# Patient Record
Sex: FEMALE | Race: WHITE | NOT HISPANIC OR LATINO | Employment: UNEMPLOYED | URBAN - METROPOLITAN AREA
[De-identification: names, ages, dates, MRNs, and addresses within clinical notes are randomized per-mention and may not be internally consistent; named-entity substitution may affect disease eponyms.]

---

## 2017-01-13 ENCOUNTER — PATIENT MESSAGE (OUTPATIENT)
Dept: INTERNAL MEDICINE | Facility: CLINIC | Age: 42
End: 2017-01-13

## 2017-01-13 DIAGNOSIS — Z20.828 EXPOSURE TO INFLUENZA: Primary | ICD-10-CM

## 2017-01-13 RX ORDER — OSELTAMIVIR PHOSPHATE 75 MG/1
75 CAPSULE ORAL DAILY
Qty: 7 CAPSULE | Refills: 0 | Status: SHIPPED | OUTPATIENT
Start: 2017-01-13 | End: 2017-01-20

## 2017-05-15 ENCOUNTER — TELEPHONE (OUTPATIENT)
Dept: INTERNAL MEDICINE | Facility: CLINIC | Age: 42
End: 2017-05-15

## 2017-05-15 ENCOUNTER — OFFICE VISIT (OUTPATIENT)
Dept: INTERNAL MEDICINE | Facility: CLINIC | Age: 42
End: 2017-05-15
Payer: COMMERCIAL

## 2017-05-15 VITALS
HEIGHT: 69 IN | OXYGEN SATURATION: 98 % | DIASTOLIC BLOOD PRESSURE: 70 MMHG | TEMPERATURE: 98 F | HEART RATE: 70 BPM | SYSTOLIC BLOOD PRESSURE: 110 MMHG | BODY MASS INDEX: 27.3 KG/M2 | WEIGHT: 184.31 LBS

## 2017-05-15 DIAGNOSIS — G89.29 CHRONIC NECK AND BACK PAIN: ICD-10-CM

## 2017-05-15 DIAGNOSIS — M54.50 CHRONIC BILATERAL LOW BACK PAIN WITHOUT SCIATICA: Primary | ICD-10-CM

## 2017-05-15 DIAGNOSIS — G89.29 CHRONIC BILATERAL LOW BACK PAIN WITHOUT SCIATICA: Primary | ICD-10-CM

## 2017-05-15 DIAGNOSIS — M54.2 CHRONIC NECK AND BACK PAIN: ICD-10-CM

## 2017-05-15 DIAGNOSIS — G89.29 CHRONIC PAIN OF LEFT KNEE: ICD-10-CM

## 2017-05-15 DIAGNOSIS — M25.562 CHRONIC PAIN OF LEFT KNEE: ICD-10-CM

## 2017-05-15 DIAGNOSIS — M25.561 ACUTE PAIN OF RIGHT KNEE: Primary | ICD-10-CM

## 2017-05-15 DIAGNOSIS — Z12.39 BREAST CANCER SCREENING: ICD-10-CM

## 2017-05-15 DIAGNOSIS — M54.9 CHRONIC NECK AND BACK PAIN: ICD-10-CM

## 2017-05-15 DIAGNOSIS — R41.840 DIFFICULTY CONCENTRATING: ICD-10-CM

## 2017-05-15 PROCEDURE — 1160F RVW MEDS BY RX/DR IN RCRD: CPT | Mod: S$GLB,,, | Performed by: FAMILY MEDICINE

## 2017-05-15 PROCEDURE — 99214 OFFICE O/P EST MOD 30 MIN: CPT | Mod: S$GLB,,, | Performed by: FAMILY MEDICINE

## 2017-05-15 PROCEDURE — 99999 PR PBB SHADOW E&M-EST. PATIENT-LVL III: CPT | Mod: PBBFAC,,, | Performed by: FAMILY MEDICINE

## 2017-05-15 NOTE — PROGRESS NOTES
Chief Complaint:    Chief Complaint   Patient presents with    Back Pain    Neck Pain       History of Present Illness:    She presents with several complaints,  She's had some low back pain and neck pain for years except that she feels like it goes towards the left thigh denies any tingling numbness and weakness.  She has been going to chiropractor without much help.  Denies any bladder or bowel dysfunction she had some x-rays done of the back shows slight narrowing of the disc space.    She has some pain in the back of the knee has been going on for years she seemed not was told that she had arthritis.  She was offered steroid injection which she refused.    She also has trouble concentrating finds that she starts many projects and not finish trouble remembering sometimes she thinks she may have ADD.    ROS:  Review of Systems   Constitutional: Negative for activity change, chills, fatigue, fever and unexpected weight change.   HENT: Negative for congestion, ear discharge, ear pain, hearing loss, postnasal drip and rhinorrhea.    Eyes: Negative for pain and visual disturbance.   Respiratory: Negative for cough, chest tightness and shortness of breath.    Cardiovascular: Negative for chest pain and palpitations.   Gastrointestinal: Negative for abdominal pain, diarrhea and vomiting.   Endocrine: Negative for heat intolerance.   Genitourinary: Negative for dysuria, flank pain, frequency and hematuria.   Musculoskeletal: Positive for back pain and neck pain. Negative for gait problem.   Skin: Negative for color change and rash.   Neurological: Negative for dizziness, tremors, seizures, numbness and headaches.   Psychiatric/Behavioral: Positive for decreased concentration. Negative for agitation, hallucinations, self-injury, sleep disturbance and suicidal ideas. The patient is not nervous/anxious.        Past Medical History:   Diagnosis Date    DDD (degenerative disc disease), lumbar     Fatty liver      "Nicotine abuse        Social History:  Social History     Social History    Marital status:      Spouse name: N/A    Number of children: N/A    Years of education: N/A     Occupational History     Homemaker     Social History Main Topics    Smoking status: Former Smoker     Types: Cigarettes     Quit date: 10/7/2015    Smokeless tobacco: Not on file    Alcohol use 0.0 oz/week     0 Standard drinks or equivalent per week      Comment: occasionally     Drug use: No    Sexual activity: Yes     Partners: Female     Other Topics Concern    Not on file     Social History Narrative    No narrative on file       Family History:   family history includes Cancer in her maternal grandmother; Diabetes in her cousin; Heart attack in her maternal grandmother; Hypertension in her maternal grandmother.    Health Maintenance   Topic Date Due    Influenza Vaccine  08/01/2017    Mammogram  11/10/2017    Pap Smear  11/11/2018    TETANUS VACCINE  03/15/2023    Lipid Panel  Completed       Physical Exam:    Vital Signs  Temp: 98.2 °F (36.8 °C)  Temp src: Tympanic  Pulse: 70  SpO2: 98 %  BP: 110/70  BP Location: Left arm  BP Method: Manual  Patient Position: Sitting  Pain Score:   6  Pain Loc: Back  Height and Weight  Height: 5' 9" (175.3 cm)  Weight: 83.6 kg (184 lb 4.9 oz)  BSA (Calculated - sq m): 2.02 sq meters  BMI (Calculated): 27.3  Weight in (lb) to have BMI = 25: 168.9]    Body mass index is 27.22 kg/(m^2).    Physical Exam   Constitutional: She is oriented to person, place, and time. She appears well-developed.   HENT:   Mouth/Throat: Oropharynx is clear and moist.   Eyes: Conjunctivae are normal. Pupils are equal, round, and reactive to light.   Neck: Normal range of motion. Neck supple.       Cardiovascular: Normal rate, regular rhythm and normal heart sounds.    No murmur heard.  Pulmonary/Chest: Effort normal and breath sounds normal. No respiratory distress. She has no wheezes. She has no rales. She " exhibits no tenderness.   Abdominal: Soft. She exhibits no distension and no mass. There is no tenderness. There is no guarding.   Musculoskeletal: She exhibits no edema.        Left knee: She exhibits normal patellar mobility, no bony tenderness, normal meniscus and no MCL laxity. Tenderness found. MCL tenderness noted. No medial joint line, no lateral joint line and no LCL tenderness noted.        Back:    Bilateral straight leg raising test is negative, bilateral knee jerk and conjunctivae intact, strength is 5 over 5 bilateral lower extremity there is no sensory loss.        Tenderness lateral hip on the left hip has full range of motion.      Tenderness posterior knee   Lymphadenopathy:     She has no cervical adenopathy.   Neurological: She is alert and oriented to person, place, and time. She has normal reflexes.   Skin: Skin is warm and dry.   Psychiatric: She has a normal mood and affect. Her behavior is normal. Judgment and thought content normal.       Lab Results   Component Value Date    CHOL 190 12/15/2016    CHOL 185 03/25/2013    CHOL 181 08/24/2010    TRIG 80 12/15/2016    TRIG 62 03/25/2013    TRIG 80 08/24/2010    HDL 60 12/15/2016    HDL 55 03/25/2013    HDL 44 08/24/2010    TOTALCHOLEST 3.2 12/15/2016    TOTALCHOLEST 3.4 03/25/2013    TOTALCHOLEST 4.1 08/24/2010    NONHDLCHOL 130 12/15/2016       Lab Results   Component Value Date    HGBA1C 5.1 12/15/2016       Assessment:      ICD-10-CM ICD-9-CM   1. Chronic bilateral low back pain without sciatica M54.5 724.2    G89.29 338.29   2. Difficulty concentrating R41.840 799.51   3. Breast cancer screening Z12.39 V76.10   4. Chronic neck and back pain M54.2 723.1    M54.9 724.5   5. Chronic pain of left knee M25.562 719.46    G89.29 338.29         Plan:  1.  Chronic low back pain he could benefit from physical therapy do not think she has any case of radiculopathy or disc herniation.  She does have some SI joint inflammation on the left she could  benefit from a steroid injection but she diffuse.  2.  Left hip bursitis offered steroid injection but she refused  3.  Chronic neck pain musculoskeletal in nature again physical therapy will help refer to [facility]  4.  Chronic knee pain posteriorly will need MRI of the knee she wants to do an open MRI because she is claustrophobic.  5.  Trouble concentrating will recommend psychological evaluation before ADD meds could be prescribed.  Orders Placed This Encounter   Procedures    Mammo Digital Screening Bilat with CAD    Ambulatory consult to Physical Therapy       Current Outpatient Prescriptions   Medication Sig Dispense Refill    cetirizine (ZYRTEC) 10 MG tablet Take 1 tablet (10 mg total) by mouth every evening. 30 tablet 1    fluticasone (FLONASE) 50 mcg/actuation nasal spray 2 sprays by Each Nare route once daily. 16 g 11     No current facility-administered medications for this visit.        Medications Discontinued During This Encounter   Medication Reason    levocetirizine (XYZAL) 5 MG tablet Patient no longer taking       No Follow-up on file.      Dr Jennifer Summers MD    Disclaimer: This note is prepared using voice recognition system and as such is likely to have errors and is not proof read.

## 2017-05-15 NOTE — MR AVS SNAPSHOT
[facility]  [address] [place] [id number]  Phone: [phone]                  Venice Orosco   5/15/2017 2:00 PM   Office Visit    Description:  Female : 1975   Provider:  Jennifer Summers MD   Department:  [facility]           Reason for Visit     Back Pain     Neck Pain           Diagnoses this Visit        Comments    Chronic bilateral low back pain without sciatica    -  Primary     Difficulty concentrating         Breast cancer screening         Chronic neck and back pain         Chronic pain of left knee                To Do List           Goals (5 Years of Data)     None      [facility]     [facility] On [facility] Nurse Care Line -  Assistance  Unless otherwise directed by your provider, please contact [facility], our nurse care line that is available for  assistance.     Registered nurses in the [facility] On Call Center provide: appointment scheduling, clinical advisement, health education, and other advisory services.  Call: [phone] (toll free)               Medications           Message regarding Medications     Verify the changes and/or additions to your medication regime listed below are the same as discussed with your clinician today.  If any of these changes or additions are incorrect, please notify your healthcare provider.        STOP taking these medications     levocetirizine (XYZAL) 5 MG tablet Take 1 tablet (5 mg total) by mouth every evening.           Verify that the below list of medications is an accurate representation of the medications you are currently taking.  If none reported, the list may be blank. If incorrect, please contact your healthcare provider. Carry this list with you in case of emergency.           Current Medications     cetirizine (ZYRTEC) 10 MG tablet Take 1 tablet (10 mg total) by mouth every evening.    fluticasone (FLONASE) 50 mcg/actuation nasal spray 2 sprays by Each Nare route once daily.  "          Clinical Reference Information           Your Vitals Were     BP Pulse Temp Height    110/70 (BP Location: Left arm, Patient Position: Sitting, BP Method: Manual) 70 98.2 °F (36.8 °C) (Tympanic) 5' 9" (1.753 m)    Weight SpO2 BMI    83.6 kg (184 lb 4.9 oz) 98% 27.22 kg/m2      Blood Pressure          Most Recent Value    BP  110/70      Allergies as of 5/15/2017     Penicillins      Immunizations Administered on Date of Encounter - 5/15/2017     None      Orders Placed During Today's Visit      Normal Orders This Visit    Ambulatory consult to Physical Therapy     Future Labs/Procedures Expected by Expires    Mammo Digital Screening Bilat with CAD  5/15/2017 (Approximate) 7/15/2018      Language Assistance Services     ATTENTION: Language assistance services are available, free of charge. Please call [phone].      ATENCIÓN: Si christinala lisa, tiene a morris disposición servicios gratuitos de asistencia lingüística. Llame al [phone].     CHÚ Ý: N?u b?n nói Ti?ng Vi?t, có các d?ch v? h? tr? ngôn ng? mi?n phí dành cho b?n. G?i s? [phone].         [facility] complies with applicable Federal civil rights laws and does not discriminate on the basis of race, color, national origin, age, disability, or sex.        "

## 2017-06-13 ENCOUNTER — PATIENT MESSAGE (OUTPATIENT)
Dept: FAMILY MEDICINE | Facility: CLINIC | Age: 42
End: 2017-06-13

## 2017-06-13 DIAGNOSIS — G89.29 CHRONIC PAIN OF LEFT KNEE: ICD-10-CM

## 2017-06-13 DIAGNOSIS — M25.562 CHRONIC PAIN OF LEFT KNEE: ICD-10-CM

## 2017-06-13 DIAGNOSIS — G89.29 CHRONIC BILATERAL LOW BACK PAIN WITHOUT SCIATICA: Primary | ICD-10-CM

## 2017-06-13 DIAGNOSIS — M54.50 CHRONIC BILATERAL LOW BACK PAIN WITHOUT SCIATICA: Primary | ICD-10-CM

## 2017-06-14 ENCOUNTER — TELEPHONE (OUTPATIENT)
Dept: FAMILY MEDICINE | Facility: CLINIC | Age: 42
End: 2017-06-14

## 2017-06-14 DIAGNOSIS — M54.5 CHRONIC LOW BACK PAIN, UNSPECIFIED BACK PAIN LATERALITY, WITH SCIATICA PRESENCE UNSPECIFIED: Primary | ICD-10-CM

## 2017-06-14 DIAGNOSIS — G89.29 CHRONIC LOW BACK PAIN, UNSPECIFIED BACK PAIN LATERALITY, WITH SCIATICA PRESENCE UNSPECIFIED: Primary | ICD-10-CM

## 2017-06-14 DIAGNOSIS — G89.29 CHRONIC PAIN OF LEFT KNEE: ICD-10-CM

## 2017-06-14 DIAGNOSIS — M25.562 CHRONIC PAIN OF LEFT KNEE: ICD-10-CM

## 2017-06-15 ENCOUNTER — PATIENT MESSAGE (OUTPATIENT)
Dept: FAMILY MEDICINE | Facility: CLINIC | Age: 42
End: 2017-06-15

## 2017-06-29 ENCOUNTER — PATIENT MESSAGE (OUTPATIENT)
Dept: FAMILY MEDICINE | Facility: CLINIC | Age: 42
End: 2017-06-29

## 2017-06-30 ENCOUNTER — PATIENT MESSAGE (OUTPATIENT)
Dept: FAMILY MEDICINE | Facility: CLINIC | Age: 42
End: 2017-06-30

## 2017-08-09 ENCOUNTER — TELEPHONE (OUTPATIENT)
Dept: FAMILY MEDICINE | Facility: CLINIC | Age: 42
End: 2017-08-09

## 2017-08-15 ENCOUNTER — PATIENT MESSAGE (OUTPATIENT)
Dept: FAMILY MEDICINE | Facility: CLINIC | Age: 42
End: 2017-08-15

## 2017-08-30 ENCOUNTER — HOSPITAL ENCOUNTER (OUTPATIENT)
Dept: RADIOLOGY | Facility: HOSPITAL | Age: 42
Discharge: HOME | End: 2017-08-30
Attending: FAMILY MEDICINE
Payer: COMMERCIAL

## 2017-08-30 DIAGNOSIS — Z12.39 BREAST CANCER SCREENING: ICD-10-CM

## 2017-08-30 PROCEDURE — 77067 SCR MAMMO BI INCL CAD: CPT | Mod: 26,,, | Performed by: RADIOLOGY

## 2017-08-30 PROCEDURE — 77067 SCR MAMMO BI INCL CAD: CPT | Mod: TC

## 2017-08-30 PROCEDURE — 77063 BREAST TOMOSYNTHESIS BI: CPT | Mod: 26,,, | Performed by: RADIOLOGY

## 2017-08-31 NOTE — PROGRESS NOTES
Your mammogram results are back.  We did not see any radiographic abnormality and a repeat mammogram in one year is recommended, unless you detect any changes.  Please understand that a normal mammogram does not exclude the possibility of a missed breast   cancer.  If you notice any changes in the breast please notify us immediately.  We do recommend monthly breast self-examination.  Recommended yearly mammogram unless otherwise contraindicated.

## 2017-09-26 ENCOUNTER — PATIENT MESSAGE (OUTPATIENT)
Dept: FAMILY MEDICINE | Facility: CLINIC | Age: 42
End: 2017-09-26

## 2017-09-26 ENCOUNTER — OFFICE VISIT (OUTPATIENT)
Dept: FAMILY MEDICINE | Facility: CLINIC | Age: 42
End: 2017-09-26
Payer: COMMERCIAL

## 2017-09-26 VITALS
HEART RATE: 78 BPM | OXYGEN SATURATION: 98 % | SYSTOLIC BLOOD PRESSURE: 122 MMHG | DIASTOLIC BLOOD PRESSURE: 66 MMHG | WEIGHT: 181.44 LBS | BODY MASS INDEX: 26.87 KG/M2 | TEMPERATURE: 97 F | HEIGHT: 69 IN

## 2017-09-26 DIAGNOSIS — J32.9 SINUSITIS, BACTERIAL: Primary | ICD-10-CM

## 2017-09-26 DIAGNOSIS — B96.89 SINUSITIS, BACTERIAL: Primary | ICD-10-CM

## 2017-09-26 PROCEDURE — 3008F BODY MASS INDEX DOCD: CPT | Mod: S$GLB,,, | Performed by: FAMILY MEDICINE

## 2017-09-26 PROCEDURE — 99999 PR PBB SHADOW E&M-EST. PATIENT-LVL III: CPT | Mod: PBBFAC,,, | Performed by: FAMILY MEDICINE

## 2017-09-26 PROCEDURE — 96372 THER/PROPH/DIAG INJ SC/IM: CPT | Mod: S$GLB,,, | Performed by: FAMILY MEDICINE

## 2017-09-26 PROCEDURE — 99214 OFFICE O/P EST MOD 30 MIN: CPT | Mod: 25,S$GLB,, | Performed by: FAMILY MEDICINE

## 2017-09-26 RX ORDER — AZITHROMYCIN 250 MG/1
TABLET, FILM COATED ORAL
Qty: 6 TABLET | Refills: 0 | Status: SHIPPED | OUTPATIENT
Start: 2017-09-26 | End: 2017-11-06 | Stop reason: ALTCHOICE

## 2017-09-26 RX ORDER — FLUTICASONE PROPIONATE 50 MCG
2 SPRAY, SUSPENSION (ML) NASAL DAILY
Qty: 16 G | Refills: 11 | Status: SHIPPED | OUTPATIENT
Start: 2017-09-26 | End: 2017-12-20 | Stop reason: SDUPTHER

## 2017-09-26 RX ORDER — CETIRIZINE HYDROCHLORIDE 10 MG/1
10 TABLET ORAL NIGHTLY
Qty: 30 TABLET | Refills: 5 | Status: SHIPPED | OUTPATIENT
Start: 2017-09-26 | End: 2019-05-01

## 2017-09-26 RX ORDER — PREDNISONE 20 MG/1
20 TABLET ORAL DAILY
Qty: 4 TABLET | Refills: 0 | Status: SHIPPED | OUTPATIENT
Start: 2017-09-26 | End: 2017-09-30

## 2017-09-26 RX ORDER — METHYLPREDNISOLONE ACETATE 40 MG/ML
40 INJECTION, SUSPENSION INTRA-ARTICULAR; INTRALESIONAL; INTRAMUSCULAR; SOFT TISSUE ONCE
Status: COMPLETED | OUTPATIENT
Start: 2017-09-26 | End: 2017-09-26

## 2017-09-26 RX ADMIN — METHYLPREDNISOLONE ACETATE 40 MG: 40 INJECTION, SUSPENSION INTRA-ARTICULAR; INTRALESIONAL; INTRAMUSCULAR; SOFT TISSUE at 01:09

## 2017-09-26 NOTE — PATIENT INSTRUCTIONS
Sinusitis (Antibiotic Treatment)    The sinuses are air-filled spaces within the bones of the face. They connect to the inside of the nose. Sinusitis is an inflammation of the tissue lining the sinus cavity. Sinus inflammation can occur during a cold. It can also be due to allergies to pollens and other particles in the air. Sinusitis can cause symptoms of sinus congestion and fullness. A sinus infection causes fever, headache and facial pain. There is often green or yellow drainage from the nose or into the back of the throat (post-nasal drip). You have been given antibiotics to treat this condition.  Home care:  · Take the full course of antibiotics as instructed. Do not stop taking them, even if you feel better.  · Drink plenty of water, hot tea, and other liquids. This may help thin mucus. It also may promote sinus drainage.  · Heat may help soothe painful areas of the face. Use a towel soaked in hot water. Or,  the shower and direct the hot spray onto your face. Using a vaporizer along with a menthol rub at night may also help.   · An expectorant containing guaifenesin may help thin the mucus and promote drainage from the sinuses.  · Over-the-counter decongestants may be used unless a similar medicine was prescribed. Nasal sprays work the fastest. Use one that contains phenylephrine or oxymetazoline. First blow the nose gently. Then use the spray. Do not use these medicines more often than directed on the label or symptoms may get worse. You may also use tablets containing pseudoephedrine. Avoid products that combine ingredients, because side effects may be increased. Read labels. You can also ask the pharmacist for help. (NOTE: Persons with high blood pressure should not use decongestants. They can raise blood pressure.)  · Over-the-counter antihistamines may help if allergies contributed to your sinusitis.    · Do not use nasal rinses or irrigation during an acute sinus infection, unless told to by  your health care provider. Rinsing may spread the infection to other sinuses.  · Use acetaminophen or ibuprofen to control pain, unless another pain medicine was prescribed. (If you have chronic liver or kidney disease or ever had a stomach ulcer, talk with your doctor before using these medicines. Aspirin should never be used in anyone under 18 years of age who is ill with a fever. It may cause severe liver damage.)  · Don't smoke. This can worsen symptoms.  Follow-up care  Follow up with your healthcare provider or our staff if you are not improving within the next week.  When to seek medical advice  Call your healthcare provider if any of these occur:  · Facial pain or headache becoming more severe  · Stiff neck  · Unusual drowsiness or confusion  · Swelling of the forehead or eyelids  · Vision problems, including blurred or double vision  · Fever of 100.4ºF (38ºC) or higher, or as directed by your healthcare provider  · Seizure  · Breathing problems  · Symptoms not resolving within 10 days  Date Last Reviewed: 4/13/2015  © 8290-6343 [employer] [address] [place] [address], [place] [address]. All rights reserved. This information is not intended as a substitute for professional medical care. Always follow your healthcare professional's instructions.

## 2017-09-26 NOTE — PROGRESS NOTES
"Subjective:       Patient ID: Venice Orosco is a 42 y.o. female.    Chief Complaint: Sinus Problem    Sinus Problem   This is a recurrent problem. The current episode started 1 to 4 weeks ago (3 weeks). The problem has been waxing and waning since onset. There has been no fever. Associated symptoms include congestion, coughing, diaphoresis, ear pain, a hoarse voice and sinus pressure. Pertinent negatives include no chills, headaches, neck pain, shortness of breath, sneezing, sore throat or swollen glands. Past treatments include oral decongestants (tried pseudafed, zyrtec and flonase.). The treatment provided mild relief.     Review of Systems   Constitutional: Positive for diaphoresis. Negative for appetite change, chills and fever.   HENT: Positive for congestion, ear discharge, ear pain, hoarse voice, postnasal drip, rhinorrhea and sinus pressure. Negative for facial swelling, mouth sores, sinus pain, sneezing and sore throat.    Eyes: Negative for discharge and itching.   Respiratory: Positive for cough. Negative for chest tightness and shortness of breath.    Cardiovascular: Negative for chest pain.   Gastrointestinal: Negative for diarrhea and nausea.   Musculoskeletal: Negative for neck pain.   Neurological: Negative for headaches.   Hematological: Negative for adenopathy.   Psychiatric/Behavioral: Negative for agitation.       Objective:      /66 (BP Location: Right arm, Patient Position: Sitting, BP Method: Medium (Manual))   Pulse 78   Temp 97.3 °F (36.3 °C) (Oral)   Ht 5' 9" (1.753 m)   Wt 82.3 kg (181 lb 7 oz)   SpO2 98%   BMI 26.79 kg/m²     Physical Exam   Constitutional: She is oriented to person, place, and time. She appears well-developed and well-nourished. No distress.   HENT:   Head: Normocephalic and atraumatic.   Right Ear: Tympanic membrane and external ear normal.   Left Ear: Tympanic membrane and external ear normal.   Nose: Mucosal edema present. Right sinus exhibits frontal " sinus tenderness. Right sinus exhibits no maxillary sinus tenderness. Left sinus exhibits frontal sinus tenderness. Left sinus exhibits no maxillary sinus tenderness.   Mouth/Throat: Uvula is midline and mucous membranes are normal. Posterior oropharyngeal erythema present. No oropharyngeal exudate, posterior oropharyngeal edema or tonsillar abscesses. Tonsils are 2+ on the right. Tonsils are 2+ on the left.   Eyes: Conjunctivae and EOM are normal.   Neck: Normal range of motion. Neck supple. No tracheal deviation present. No thyromegaly present.   Cardiovascular: Normal rate, regular rhythm, normal heart sounds and intact distal pulses.    Pulmonary/Chest: Effort normal and breath sounds normal. No respiratory distress.   Abdominal: Soft. Bowel sounds are normal. She exhibits no distension. There is no tenderness.   Musculoskeletal: Normal range of motion. She exhibits no edema.   Lymphadenopathy:     She has no cervical adenopathy.   Neurological: She is alert and oriented to person, place, and time.   Skin: Skin is warm and dry. She is not diaphoretic.   Psychiatric: She has a normal mood and affect. Her behavior is normal.   Nursing note and vitals reviewed.      Assessment:       1. Sinusitis, bacterial            Plan:   Sinusitis, bacterial        -     Present for 3 weeks        -     failed conservative treatment without abx and not getting better  -     cetirizine (ZYRTEC) 10 MG tablet; Take 1 tablet (10 mg total) by mouth every evening.  Dispense: 30 tablet; Refill: 5  -     methylPREDNISolone acetate injection 40 mg; Inject 1 mL (40 mg total) into the muscle once.  -     fluticasone (FLONASE) 50 mcg/actuation nasal spray; 2 sprays by Each Nare route once daily.  Dispense: 16 g; Refill: 11  -     predniSONE (DELTASONE) 20 MG tablet; Take 1 tablet (20 mg total) by mouth once daily. Start tomorrow  Dispense: 4 tablet; Refill: 0    Other orders  -     azithromycin (ZITHROMAX Z-MANNY) 250 MG tablet; Take 2 tabs  on day 1 and 1 tab on 2-4  Dispense: 6 tablet; Refill: 0

## 2017-09-26 NOTE — PROGRESS NOTES
Pt given methylprednisolone acetate injection 40 mg/ ml IM left ventrogluteal. Pt advised to wait 15 minutes to observe for adverse reaction. Pt tolerated well.

## 2017-10-10 ENCOUNTER — PATIENT MESSAGE (OUTPATIENT)
Dept: FAMILY MEDICINE | Facility: CLINIC | Age: 42
End: 2017-10-10

## 2017-10-18 ENCOUNTER — TELEPHONE (OUTPATIENT)
Dept: FAMILY MEDICINE | Facility: CLINIC | Age: 42
End: 2017-10-18

## 2017-10-24 ENCOUNTER — PATIENT MESSAGE (OUTPATIENT)
Dept: FAMILY MEDICINE | Facility: CLINIC | Age: 42
End: 2017-10-24

## 2017-11-01 ENCOUNTER — PATIENT MESSAGE (OUTPATIENT)
Dept: FAMILY MEDICINE | Facility: CLINIC | Age: 42
End: 2017-11-01

## 2017-11-06 ENCOUNTER — TELEPHONE (OUTPATIENT)
Dept: FAMILY MEDICINE | Facility: CLINIC | Age: 42
End: 2017-11-06

## 2017-11-06 ENCOUNTER — OFFICE VISIT (OUTPATIENT)
Dept: FAMILY MEDICINE | Facility: CLINIC | Age: 42
End: 2017-11-06
Payer: COMMERCIAL

## 2017-11-06 ENCOUNTER — LAB VISIT (OUTPATIENT)
Dept: LAB | Facility: HOSPITAL | Age: 42
End: 2017-11-06
Attending: FAMILY MEDICINE
Payer: COMMERCIAL

## 2017-11-06 VITALS
SYSTOLIC BLOOD PRESSURE: 132 MMHG | HEIGHT: 69 IN | HEART RATE: 66 BPM | DIASTOLIC BLOOD PRESSURE: 68 MMHG | BODY MASS INDEX: 26.47 KG/M2 | TEMPERATURE: 98 F | OXYGEN SATURATION: 99 % | WEIGHT: 178.69 LBS

## 2017-11-06 DIAGNOSIS — Z00.00 WELL ADULT EXAM: ICD-10-CM

## 2017-11-06 DIAGNOSIS — Z98.84 HX OF LAPAROSCOPIC ADJUSTABLE GASTRIC BANDING: ICD-10-CM

## 2017-11-06 DIAGNOSIS — M76.32 IT BAND SYNDROME, LEFT: ICD-10-CM

## 2017-11-06 DIAGNOSIS — G89.29 CHRONIC BILATERAL LOW BACK PAIN WITHOUT SCIATICA: ICD-10-CM

## 2017-11-06 DIAGNOSIS — M54.50 CHRONIC BILATERAL LOW BACK PAIN WITHOUT SCIATICA: ICD-10-CM

## 2017-11-06 DIAGNOSIS — Z00.00 WELL ADULT EXAM: Primary | ICD-10-CM

## 2017-11-06 LAB
ALBUMIN SERPL BCP-MCNC: 3.8 G/DL
ALP SERPL-CCNC: 62 U/L
ALT SERPL W/O P-5'-P-CCNC: 11 U/L
ANION GAP SERPL CALC-SCNC: 9 MMOL/L
AST SERPL-CCNC: 14 U/L
BASOPHILS # BLD AUTO: 0.03 K/UL
BASOPHILS NFR BLD: 0.5 %
BILIRUB SERPL-MCNC: 0.5 MG/DL
BUN SERPL-MCNC: 17 MG/DL
CALCIUM SERPL-MCNC: 9.3 MG/DL
CHLORIDE SERPL-SCNC: 103 MMOL/L
CHOLEST SERPL-MCNC: 177 MG/DL
CHOLEST/HDLC SERPL: 3.2 {RATIO}
CO2 SERPL-SCNC: 27 MMOL/L
CREAT SERPL-MCNC: 0.8 MG/DL
DIFFERENTIAL METHOD: NORMAL
EOSINOPHIL # BLD AUTO: 0.1 K/UL
EOSINOPHIL NFR BLD: 1 %
ERYTHROCYTE [DISTWIDTH] IN BLOOD BY AUTOMATED COUNT: 12.2 %
EST. GFR  (AFRICAN AMERICAN): >60 ML/MIN/1.73 M^2
EST. GFR  (NON AFRICAN AMERICAN): >60 ML/MIN/1.73 M^2
GLUCOSE SERPL-MCNC: 116 MG/DL
HCT VFR BLD AUTO: 37.4 %
HDLC SERPL-MCNC: 56 MG/DL
HDLC SERPL: 31.6 %
HGB BLD-MCNC: 12.4 G/DL
IMM GRANULOCYTES # BLD AUTO: 0.03 K/UL
IMM GRANULOCYTES NFR BLD AUTO: 0.5 %
LDLC SERPL CALC-MCNC: 106 MG/DL
LYMPHOCYTES # BLD AUTO: 1.7 K/UL
LYMPHOCYTES NFR BLD: 26.7 %
MCH RBC QN AUTO: 30.7 PG
MCHC RBC AUTO-ENTMCNC: 33.2 G/DL
MCV RBC AUTO: 93 FL
MONOCYTES # BLD AUTO: 0.4 K/UL
MONOCYTES NFR BLD: 6.5 %
NEUTROPHILS # BLD AUTO: 4 K/UL
NEUTROPHILS NFR BLD: 64.8 %
NONHDLC SERPL-MCNC: 121 MG/DL
NRBC BLD-RTO: 0 /100 WBC
PLATELET # BLD AUTO: 201 K/UL
PMV BLD AUTO: 10.8 FL
POTASSIUM SERPL-SCNC: 4.7 MMOL/L
PROT SERPL-MCNC: 6.8 G/DL
RBC # BLD AUTO: 4.04 M/UL
SODIUM SERPL-SCNC: 139 MMOL/L
TRIGL SERPL-MCNC: 75 MG/DL
WBC # BLD AUTO: 6.17 K/UL

## 2017-11-06 PROCEDURE — 85025 COMPLETE CBC W/AUTO DIFF WBC: CPT

## 2017-11-06 PROCEDURE — 82607 VITAMIN B-12: CPT

## 2017-11-06 PROCEDURE — 82306 VITAMIN D 25 HYDROXY: CPT

## 2017-11-06 PROCEDURE — 80053 COMPREHEN METABOLIC PANEL: CPT

## 2017-11-06 PROCEDURE — 99999 PR PBB SHADOW E&M-EST. PATIENT-LVL III: CPT | Mod: PBBFAC,,, | Performed by: FAMILY MEDICINE

## 2017-11-06 PROCEDURE — 80061 LIPID PANEL: CPT

## 2017-11-06 PROCEDURE — 36415 COLL VENOUS BLD VENIPUNCTURE: CPT | Mod: PO

## 2017-11-06 PROCEDURE — 99396 PREV VISIT EST AGE 40-64: CPT | Mod: S$GLB,,, | Performed by: FAMILY MEDICINE

## 2017-11-06 RX ORDER — MELOXICAM 7.5 MG/1
1 TABLET ORAL DAILY
COMMUNITY
Start: 2017-10-31 | End: 2017-12-20 | Stop reason: SDUPTHER

## 2017-11-06 NOTE — TELEPHONE ENCOUNTER
----- Message from Nahun Iverson sent at 11/6/2017 12:03 PM CST -----  Pt wants to make an appt and establish her son, who will be 18 on 11-11.  He is due for his physical and needs a power lifting form filled out.       His insurance is [employer].      I know Dr Summers is booked out far.  So wanted to see if Nurse could schedule him sooner.  Due to him needing the form filled out very soon.      Thanks, Nahun

## 2017-11-07 LAB
25(OH)D3+25(OH)D2 SERPL-MCNC: 56 NG/ML
VIT B12 SERPL-MCNC: 488 PG/ML

## 2017-11-07 NOTE — PROGRESS NOTES
Chief Complaint:    Chief Complaint   Patient presents with    Annual Exam       History of Present Illness:  She presents today for physical.  She is doing well she still has his back pain she has done physical therapy for a while.  Pain does not radiate anywhere.  She's had MRI which had shown annular tear of the disc but her pain is slightly lateral to that.  Also has pain in the left hip and the lateral left thigh.  History of gastric banding procedure.  Due for labs.      ROS:  Review of Systems   Constitutional: Negative for activity change, chills, fatigue, fever and unexpected weight change.   HENT: Negative for congestion, ear discharge, ear pain, hearing loss, postnasal drip and rhinorrhea.    Eyes: Negative for pain and visual disturbance.   Respiratory: Negative for cough, chest tightness and shortness of breath.    Cardiovascular: Negative for chest pain and palpitations.   Gastrointestinal: Negative for abdominal pain, diarrhea and vomiting.   Endocrine: Negative for heat intolerance.   Genitourinary: Negative for dysuria, flank pain, frequency and hematuria.   Musculoskeletal: Positive for back pain. Negative for gait problem and neck pain.   Skin: Negative for color change and rash.   Neurological: Negative for dizziness, tremors, seizures, numbness and headaches.   Psychiatric/Behavioral: Negative for agitation, hallucinations, self-injury, sleep disturbance and suicidal ideas. The patient is not nervous/anxious.        Past Medical History:   Diagnosis Date    DDD (degenerative disc disease), lumbar     Fatty liver     Nicotine abuse        Social History:  Social History     Social History    Marital status:      Spouse name: N/A    Number of children: N/A    Years of education: N/A     Occupational History     Homemaker     Social History Main Topics    Smoking status: Former Smoker     Types: Cigarettes     Quit date: 10/7/2015    Smokeless tobacco: Never Used    Alcohol use  "0.0 oz/week      Comment: occasionally     Drug use: No    Sexual activity: Yes     Partners: Male     Other Topics Concern    None     Social History Narrative    None       Family History:   family history includes Breast cancer in her maternal grandmother and mother; Cancer in her maternal grandmother; Diabetes in her cousin; Heart attack in her maternal grandmother; Hypertension in her maternal grandmother.    Health Maintenance   Topic Date Due    Influenza Vaccine  08/01/2017    Pap Smear with HPV Cotest  11/11/2018    Mammogram  08/30/2019    TETANUS VACCINE  03/15/2023    Lipid Panel  Completed       Physical Exam:    Vital Signs  Temp: 98.1 °F (36.7 °C)  Temp src: Tympanic  Pulse: 66  SpO2: 99 %  BP: 132/68  BP Location: Left arm  Patient Position: Sitting  Pain Score: 0-No pain  Height and Weight  Height: 5' 9" (175.3 cm)  Weight: 81.1 kg (178 lb 10.9 oz)  BSA (Calculated - sq m): 1.99 sq meters  BMI (Calculated): 26.4  Weight in (lb) to have BMI = 25: 168.9]    Body mass index is 26.39 kg/m².    Physical Exam   Constitutional: She is oriented to person, place, and time. She appears well-developed.   HENT:   Mouth/Throat: Oropharynx is clear and moist.   Eyes: Conjunctivae are normal. Pupils are equal, round, and reactive to light.   Neck: Normal range of motion. Neck supple.   Cardiovascular: Normal rate, regular rhythm and normal heart sounds.    No murmur heard.  Pulmonary/Chest: Effort normal and breath sounds normal. No respiratory distress. She has no wheezes. She has no rales. She exhibits no tenderness.   Abdominal: Soft. She exhibits no distension and no mass. There is no tenderness. There is no guarding.   Musculoskeletal: She exhibits no edema or tenderness.        Back:         Legs:  Straight leg raising test is negative bilaterally.   Lymphadenopathy:     She has no cervical adenopathy.   Neurological: She is alert and oriented to person, place, and time. She has normal reflexes. "   Skin: Skin is warm and dry.   Psychiatric: She has a normal mood and affect. Her behavior is normal. Judgment and thought content normal.       Lab Results   Component Value Date    CHOL 190 12/15/2016    CHOL 185 03/25/2013    CHOL 181 08/24/2010    TRIG 80 12/15/2016    TRIG 62 03/25/2013    TRIG 80 08/24/2010    HDL 60 12/15/2016    HDL 55 03/25/2013    HDL 44 08/24/2010    TOTALCHOLEST 3.2 12/15/2016    TOTALCHOLEST 3.4 03/25/2013    TOTALCHOLEST 4.1 08/24/2010    NONHDLCHOL 130 12/15/2016       Lab Results   Component Value Date    HGBA1C 5.1 12/15/2016       Assessment:      ICD-10-CM ICD-9-CM   1. Well adult exam Z00.00 V70.0   2. Chronic bilateral low back pain without sciatica M54.5 724.2    G89.29 338.29   3. It band syndrome, left M76.32 728.89   4. Hx of laparoscopic adjustable gastric banding Z98.84 V45.86         Plan:  It appears that patient's pain is coming from muscle on the left paralumbar region.  I recommend continued physical therapy.  She can also try yoga for back.  She also has IT band syndrome on the left stretching of the IT band is also recommended.  Check labs as below.    Orders Placed This Encounter   Procedures    CBC auto differential    Comprehensive metabolic panel    Lipid panel    Vitamin B12    Vitamin D       Current Outpatient Prescriptions   Medication Sig Dispense Refill    cetirizine (ZYRTEC) 10 MG tablet Take 1 tablet (10 mg total) by mouth every evening. 30 tablet 5    fluticasone (FLONASE) 50 mcg/actuation nasal spray 2 sprays by Each Nare route once daily. 16 g 11    meloxicam (MOBIC) 7.5 MG tablet Take 1 tablet by mouth once daily.      NATURE-THROID 32.5 mg Tab Take 1 tablet by mouth once daily.       No current facility-administered medications for this visit.        Medications Discontinued During This Encounter   Medication Reason    azithromycin (ZITHROMAX Z-MANNY) 250 MG tablet Therapy completed       No Follow-up on file.      Jennifer Summers,  MD

## 2017-12-18 ENCOUNTER — PATIENT MESSAGE (OUTPATIENT)
Dept: FAMILY MEDICINE | Facility: CLINIC | Age: 42
End: 2017-12-18

## 2017-12-20 ENCOUNTER — OFFICE VISIT (OUTPATIENT)
Dept: FAMILY MEDICINE | Facility: CLINIC | Age: 42
End: 2017-12-20
Payer: COMMERCIAL

## 2017-12-20 VITALS
BODY MASS INDEX: 26.21 KG/M2 | TEMPERATURE: 98 F | HEART RATE: 84 BPM | HEIGHT: 69 IN | SYSTOLIC BLOOD PRESSURE: 108 MMHG | WEIGHT: 176.94 LBS | OXYGEN SATURATION: 99 % | DIASTOLIC BLOOD PRESSURE: 70 MMHG

## 2017-12-20 DIAGNOSIS — R05.9 COUGH: ICD-10-CM

## 2017-12-20 DIAGNOSIS — B96.89 ACUTE BACTERIAL SINUSITIS: Primary | ICD-10-CM

## 2017-12-20 DIAGNOSIS — M51.36 DDD (DEGENERATIVE DISC DISEASE), LUMBAR: ICD-10-CM

## 2017-12-20 DIAGNOSIS — J01.90 ACUTE BACTERIAL SINUSITIS: Primary | ICD-10-CM

## 2017-12-20 PROCEDURE — 99999 PR PBB SHADOW E&M-EST. PATIENT-LVL IV: CPT | Mod: PBBFAC,,,

## 2017-12-20 PROCEDURE — 96372 THER/PROPH/DIAG INJ SC/IM: CPT | Mod: S$GLB,,, | Performed by: FAMILY MEDICINE

## 2017-12-20 PROCEDURE — 99214 OFFICE O/P EST MOD 30 MIN: CPT | Mod: 25,S$GLB,,

## 2017-12-20 RX ORDER — MELOXICAM 7.5 MG/1
7.5 TABLET ORAL DAILY
Qty: 90 TABLET | Refills: 0 | Status: SHIPPED | OUTPATIENT
Start: 2017-12-20 | End: 2018-03-20

## 2017-12-20 RX ORDER — BETAMETHASONE SODIUM PHOSPHATE AND BETAMETHASONE ACETATE 3; 3 MG/ML; MG/ML
12 INJECTION, SUSPENSION INTRA-ARTICULAR; INTRALESIONAL; INTRAMUSCULAR; SOFT TISSUE
Status: COMPLETED | OUTPATIENT
Start: 2017-12-20 | End: 2017-12-20

## 2017-12-20 RX ORDER — CYCLOBENZAPRINE HCL 5 MG
5 TABLET ORAL 3 TIMES DAILY PRN
Qty: 60 TABLET | Refills: 0 | Status: SHIPPED | OUTPATIENT
Start: 2017-12-20 | End: 2018-01-09

## 2017-12-20 RX ORDER — CETIRIZINE HYDROCHLORIDE 10 MG/1
10 TABLET ORAL NIGHTLY
Qty: 30 TABLET | Refills: 5 | Status: CANCELLED | OUTPATIENT
Start: 2017-12-20 | End: 2018-12-20

## 2017-12-20 RX ORDER — AZITHROMYCIN 250 MG/1
250 TABLET, FILM COATED ORAL DAILY
Qty: 6 TABLET | Refills: 0 | Status: SHIPPED | OUTPATIENT
Start: 2017-12-20 | End: 2017-12-25

## 2017-12-20 RX ORDER — PROMETHAZINE HYDROCHLORIDE AND DEXTROMETHORPHAN HYDROBROMIDE 6.25; 15 MG/5ML; MG/5ML
5 SYRUP ORAL 3 TIMES DAILY PRN
Qty: 180 ML | Refills: 0 | Status: SHIPPED | OUTPATIENT
Start: 2017-12-20 | End: 2017-12-30

## 2017-12-20 RX ORDER — FLUTICASONE PROPIONATE 50 MCG
2 SPRAY, SUSPENSION (ML) NASAL DAILY
Qty: 16 G | Refills: 11 | Status: SHIPPED | OUTPATIENT
Start: 2017-12-20 | End: 2018-12-28

## 2017-12-20 RX ADMIN — BETAMETHASONE SODIUM PHOSPHATE AND BETAMETHASONE ACETATE 12 MG: 3; 3 INJECTION, SUSPENSION INTRA-ARTICULAR; INTRALESIONAL; INTRAMUSCULAR; SOFT TISSUE at 04:12

## 2017-12-20 NOTE — PROGRESS NOTES
Subjective:       Patient ID: Venice Orosco is a 42 y.o. female.    Chief Complaint: Sinusitis and Sore Throat    HPI   The patient presents today with a 4 day(s) complaint of nasal congestion, PND, rhinorrhea. she says Her symptoms are constant and severe in intensity.  she voices associated maxillary sinus pressure and cough. she denies fever, SOB, or wheezing. she can not identify any exacerbating or mitigating factors.    Patient also voices a chronic complaint of degenerative disc disease for which she has taken Mobic in the past.  She says movement works fairly well to control her pain most of the time she says the pain is constant and waxing and waning she describes it as an aching sensation.  She denies any numbness or tingling in her lower extremities.  She denies any loss of bowel or bladder control.  She denies any saddle anesthesia.  Patient denies any recent or remote trauma to the back.  She says that she does not have to take the Motrin daily.  She says she does take Flexeril sporadically when she's having a bad flare of the pain.    Review of Systems   Constitutional: Negative for activity change, appetite change, fatigue, fever and unexpected weight change.   HENT: Positive for congestion, ear pain, postnasal drip, rhinorrhea and sore throat.    Eyes: Negative.    Respiratory: Positive for cough. Negative for chest tightness, shortness of breath and wheezing.    Cardiovascular: Negative for chest pain, palpitations and leg swelling.   Gastrointestinal: Negative for constipation, diarrhea, nausea and vomiting.   Endocrine: Negative.    Genitourinary: Negative.    Musculoskeletal: Negative.    Skin: Negative for color change.   Allergic/Immunologic: Negative.    Neurological: Negative for dizziness, weakness and light-headedness.   Hematological: Negative.    Psychiatric/Behavioral: Negative for sleep disturbance.         Objective:      Physical Exam   Constitutional: She is oriented to person,  place, and time. She appears well-developed and well-nourished. No distress.   HENT:   Head: Normocephalic and atraumatic. Hair is normal.   Right Ear: Hearing, tympanic membrane, external ear and ear canal normal.   Left Ear: Hearing, tympanic membrane, external ear and ear canal normal.   Nose: No mucosal edema, rhinorrhea, nose lacerations, sinus tenderness, nasal deformity, septal deviation or nasal septal hematoma. No epistaxis.  No foreign bodies. Right sinus exhibits maxillary sinus tenderness. Right sinus exhibits no frontal sinus tenderness. Left sinus exhibits maxillary sinus tenderness. Left sinus exhibits no frontal sinus tenderness.   Mouth/Throat: Uvula is midline, oropharynx is clear and moist and mucous membranes are normal.   Eyes: Conjunctivae are normal. Pupils are equal, round, and reactive to light. Right eye exhibits no discharge. Left eye exhibits no discharge.   Neck: Trachea normal, normal range of motion and phonation normal. Neck supple. No tracheal deviation present.   Cardiovascular: Normal rate, regular rhythm, normal heart sounds and intact distal pulses.  Exam reveals no gallop and no friction rub.    No murmur heard.  Pulmonary/Chest: Effort normal and breath sounds normal. No respiratory distress. She has no decreased breath sounds. She has no wheezes. She has no rhonchi. She has no rales. She exhibits no tenderness.   Musculoskeletal: Normal range of motion.        Lumbar back: She exhibits pain. She exhibits normal range of motion, no tenderness, no bony tenderness, no swelling, no edema, no deformity, no laceration, no spasm and normal pulse.   Lymphadenopathy:        Head (right side): No submental, no submandibular, no tonsillar, no preauricular, no posterior auricular and no occipital adenopathy present.        Head (left side): No submental, no submandibular, no tonsillar, no preauricular, no posterior auricular and no occipital adenopathy present.     She has no cervical  adenopathy.        Right cervical: No superficial cervical, no deep cervical and no posterior cervical adenopathy present.       Left cervical: No superficial cervical, no deep cervical and no posterior cervical adenopathy present.   Neurological: She is alert and oriented to person, place, and time. She exhibits normal muscle tone. GCS eye subscore is 4. GCS verbal subscore is 5. GCS motor subscore is 6.   Skin: Skin is warm and dry. No rash noted. She is not diaphoretic. No erythema. No pallor.   Psychiatric: She has a normal mood and affect. Her speech is normal and behavior is normal. Judgment and thought content normal.       Assessment:       1. Sinusitis, bacterial          Plan:   Sinusitis, bacterial  -     cetirizine (ZYRTEC) 10 MG tablet; Take 1 tablet (10 mg total) by mouth every evening.  Dispense: 30 tablet; Refill: 5  -     fluticasone (FLONASE) 50 mcg/actuation nasal spray; 2 sprays by Each Nare route once daily.  Dispense: 16 g; Refill: 11    Other orders  -     meloxicam (MOBIC) 7.5 MG tablet; Take 1 tablet (7.5 mg total) by mouth once daily.            Disclaimer: This note is prepared using voice recognition software.

## 2017-12-20 NOTE — PROGRESS NOTES
Pt given betamethasone acetate- betamethasone sodium phosphate injection 12 mg/ 2 ml IM left ventrogluteal. Pt advised to wait 15 minutes to observe for adverse reaction. Pt tolerated well.

## 2017-12-29 ENCOUNTER — PATIENT MESSAGE (OUTPATIENT)
Dept: FAMILY MEDICINE | Facility: CLINIC | Age: 42
End: 2017-12-29

## 2018-01-02 ENCOUNTER — TELEPHONE (OUTPATIENT)
Dept: FAMILY MEDICINE | Facility: CLINIC | Age: 43
End: 2018-01-02

## 2018-01-02 DIAGNOSIS — D72.829 LEUKOCYTOSIS, UNSPECIFIED TYPE: Primary | ICD-10-CM

## 2018-01-03 ENCOUNTER — OFFICE VISIT (OUTPATIENT)
Dept: FAMILY MEDICINE | Facility: CLINIC | Age: 43
End: 2018-01-03
Payer: COMMERCIAL

## 2018-01-03 VITALS
TEMPERATURE: 98 F | OXYGEN SATURATION: 98 % | WEIGHT: 171.94 LBS | DIASTOLIC BLOOD PRESSURE: 60 MMHG | HEART RATE: 79 BPM | BODY MASS INDEX: 25.47 KG/M2 | SYSTOLIC BLOOD PRESSURE: 104 MMHG | HEIGHT: 69 IN

## 2018-01-03 DIAGNOSIS — F90.9 ADULT ADHD: Primary | ICD-10-CM

## 2018-01-03 PROCEDURE — 99999 PR PBB SHADOW E&M-EST. PATIENT-LVL III: CPT | Mod: PBBFAC,,, | Performed by: FAMILY MEDICINE

## 2018-01-03 PROCEDURE — 99214 OFFICE O/P EST MOD 30 MIN: CPT | Mod: S$GLB,,, | Performed by: FAMILY MEDICINE

## 2018-01-03 RX ORDER — DEXTROAMPHETAMINE SACCHARATE, AMPHETAMINE ASPARTATE MONOHYDRATE, DEXTROAMPHETAMINE SULFATE AND AMPHETAMINE SULFATE 5; 5; 5; 5 MG/1; MG/1; MG/1; MG/1
20 CAPSULE, EXTENDED RELEASE ORAL EVERY MORNING
Qty: 30 CAPSULE | Refills: 0 | Status: SHIPPED | OUTPATIENT
Start: 2018-01-03 | End: 2018-01-29

## 2018-01-03 NOTE — PROGRESS NOTES
Patient is here to discuss her ADD evaluation.    She had an evaluation done by [facility] clinical psychologist and I have the report of which will be scanned into the system, this is dated December 29, 2017.  He is a brief summary of the results of her evaluation,  Results of testing revealed that patient has a long-standing weakness involving ability.  She is clearly accommodated for these issues by having overly developed spatial abilities, which elevated in the high average range.  There is also related to weakness and processing speed, consistent with ADHD.  Her ability to solve normal problems and multitask is intact: However her sustained attention is very poor, especially when presented with auditory information.  Her performance on the CPT measured mashed that he ADHD normative data.  She has been completed self-report measures and their answers consistently indicated that inattention, somatic complaints, difficulty with racing thoughts and aggression or issues.  Measures of 4.4 within normal limits.  Overall patient's was also consistent with ADHD, inattentive type.  She has a mild case and medication may be beneficial although it will not help with initiating task in procrastinating.    We'll discuss medication management, she will be started on albuterol discuss the habit-forming potential in the abuse potential.  Discussed the side effects of medication.  She will be undergoing urine drug screens every once in yearly, and she will be signing the controlled medication agreement next visit.  We'll start her on Adderall XR 20 mg and see how she does she will call us back and let us know if this working well then 2 more prescriptions can be given to her.    Spent about 20 minutes face time with the patient off which more than 50% of the time was spent ounces and patient condition, discussing medication options and the side effects and so forth.

## 2018-01-16 ENCOUNTER — PATIENT MESSAGE (OUTPATIENT)
Dept: FAMILY MEDICINE | Facility: CLINIC | Age: 43
End: 2018-01-16

## 2018-01-22 ENCOUNTER — PATIENT MESSAGE (OUTPATIENT)
Dept: FAMILY MEDICINE | Facility: CLINIC | Age: 43
End: 2018-01-22

## 2018-01-23 ENCOUNTER — OFFICE VISIT (OUTPATIENT)
Dept: FAMILY MEDICINE | Facility: CLINIC | Age: 43
End: 2018-01-23
Payer: COMMERCIAL

## 2018-01-23 VITALS
WEIGHT: 170.19 LBS | TEMPERATURE: 98 F | HEART RATE: 89 BPM | HEIGHT: 69 IN | BODY MASS INDEX: 25.21 KG/M2 | SYSTOLIC BLOOD PRESSURE: 112 MMHG | DIASTOLIC BLOOD PRESSURE: 72 MMHG | OXYGEN SATURATION: 99 %

## 2018-01-23 DIAGNOSIS — J02.9 PHARYNGITIS, UNSPECIFIED ETIOLOGY: Primary | ICD-10-CM

## 2018-01-23 PROCEDURE — 99213 OFFICE O/P EST LOW 20 MIN: CPT | Mod: S$GLB,,,

## 2018-01-23 PROCEDURE — 99999 PR PBB SHADOW E&M-EST. PATIENT-LVL IV: CPT | Mod: PBBFAC,,,

## 2018-01-23 PROCEDURE — 87070 CULTURE OTHR SPECIMN AEROBIC: CPT

## 2018-01-23 NOTE — PROGRESS NOTES
Subjective:       Patient ID: Venice Orosco is a 43 y.o. female.    Chief Complaint: Sinus Problem and Sore Throat    HPI   Patient presents today with a primary complaint of sore throat pain that has been going on for approximately 3 weeks.  She says in that time she has taken 2 rounds of azithromycin along with steroids.  She voices no relief.  She says the pain is intermittent and mild to moderate in intensity.  She denies any associated fever, cough, wheezing, or abdominal pain.  She does voice some associated sinus pressure and nasal congestion.  She also voices some headaches.  She cannot identify exacerbating or mitigating factors.     Review of Systems   Constitutional: Negative for activity change, appetite change, fatigue, fever and unexpected weight change.   HENT: Positive for congestion, sinus pressure and sore throat.    Eyes: Negative for pain, discharge and itching.   Respiratory: Negative for cough, chest tightness, shortness of breath and wheezing.    Cardiovascular: Negative for chest pain, palpitations and leg swelling.   Gastrointestinal: Negative for constipation, diarrhea, nausea and vomiting.   Endocrine: Negative.    Genitourinary: Negative.    Musculoskeletal: Negative.    Skin: Negative for color change.   Allergic/Immunologic: Negative.    Neurological: Positive for headaches. Negative for dizziness, weakness and light-headedness.   Hematological: Negative.    Psychiatric/Behavioral: Negative for sleep disturbance.         Objective:      Physical Exam   Constitutional: She is oriented to person, place, and time. She appears well-developed and well-nourished. No distress.   HENT:   Head: Normocephalic and atraumatic. Hair is normal.   Right Ear: Hearing, tympanic membrane, external ear and ear canal normal.   Left Ear: Hearing, tympanic membrane, external ear and ear canal normal.   Nose: No mucosal edema, rhinorrhea, nose lacerations, sinus tenderness, nasal deformity, septal  deviation or nasal septal hematoma. No epistaxis.  No foreign bodies. Right sinus exhibits no maxillary sinus tenderness and no frontal sinus tenderness. Left sinus exhibits no maxillary sinus tenderness and no frontal sinus tenderness.   Mouth/Throat: Uvula is midline, oropharynx is clear and moist and mucous membranes are normal.   Eyes: Conjunctivae are normal. Pupils are equal, round, and reactive to light. Right eye exhibits no discharge. Left eye exhibits no discharge.   Neck: Trachea normal, normal range of motion and phonation normal. Neck supple. No tracheal deviation present.   Cardiovascular: Normal rate, regular rhythm, normal heart sounds and intact distal pulses.  Exam reveals no gallop and no friction rub.    No murmur heard.  Pulmonary/Chest: Effort normal and breath sounds normal. No respiratory distress. She has no decreased breath sounds. She has no wheezes. She has no rhonchi. She has no rales. She exhibits no tenderness.   Musculoskeletal: Normal range of motion.   Lymphadenopathy:        Head (right side): No submental, no submandibular, no tonsillar, no preauricular, no posterior auricular and no occipital adenopathy present.        Head (left side): No submental, no submandibular, no tonsillar, no preauricular, no posterior auricular and no occipital adenopathy present.     She has no cervical adenopathy.        Right cervical: No superficial cervical, no deep cervical and no posterior cervical adenopathy present.       Left cervical: No superficial cervical, no deep cervical and no posterior cervical adenopathy present.   Neurological: She is alert and oriented to person, place, and time. She exhibits normal muscle tone. GCS eye subscore is 4. GCS verbal subscore is 5. GCS motor subscore is 6.   Skin: Skin is warm and dry. No rash noted. She is not diaphoretic. No erythema. No pallor.   Psychiatric: She has a normal mood and affect. Her speech is normal and behavior is normal. Judgment and  thought content normal.       Assessment:       1. Pharyngitis, unspecified etiology          Plan:   Pharyngitis, unspecified etiology  -     Throat culture        -     I explained to the patient that we would see what the culture shows says she is oriented to rounds of antibiotics it would just increase her risk of antibiotics side effects of the treated without a positive culture result        -     I further explained that according to her exam I did not see any signs of bacterial infection this is most likely viral          Disclaimer: This note is prepared using voice recognition software.

## 2018-01-26 LAB — BACTERIA THROAT CULT: NORMAL

## 2018-01-29 ENCOUNTER — PATIENT MESSAGE (OUTPATIENT)
Dept: FAMILY MEDICINE | Facility: CLINIC | Age: 43
End: 2018-01-29

## 2018-01-29 RX ORDER — DEXTROAMPHETAMINE SACCHARATE, AMPHETAMINE ASPARTATE MONOHYDRATE, DEXTROAMPHETAMINE SULFATE AND AMPHETAMINE SULFATE 7.5; 7.5; 7.5; 7.5 MG/1; MG/1; MG/1; MG/1
30 CAPSULE, EXTENDED RELEASE ORAL EVERY MORNING
Qty: 30 CAPSULE | Refills: 0 | Status: SHIPPED | OUTPATIENT
Start: 2018-02-05 | End: 2018-03-07

## 2018-02-05 ENCOUNTER — PATIENT MESSAGE (OUTPATIENT)
Dept: FAMILY MEDICINE | Facility: CLINIC | Age: 43
End: 2018-02-05

## 2018-02-05 NOTE — TELEPHONE ENCOUNTER
Patient was calling regarding her prescription of adderall that was to be sent in for increased dose.She was notified that the 30mg script was sent in on 1/29/18 to be filled today

## 2018-03-06 ENCOUNTER — PATIENT MESSAGE (OUTPATIENT)
Dept: FAMILY MEDICINE | Facility: CLINIC | Age: 43
End: 2018-03-06

## 2018-03-07 RX ORDER — DEXTROAMPHETAMINE SACCHARATE, AMPHETAMINE ASPARTATE MONOHYDRATE, DEXTROAMPHETAMINE SULFATE AND AMPHETAMINE SULFATE 6.25; 6.25; 6.25; 6.25 MG/1; MG/1; MG/1; MG/1
25 CAPSULE, EXTENDED RELEASE ORAL EVERY MORNING
Qty: 30 CAPSULE | Refills: 0 | Status: SHIPPED | OUTPATIENT
Start: 2018-03-07 | End: 2018-04-10 | Stop reason: SDUPTHER

## 2018-04-04 ENCOUNTER — PATIENT MESSAGE (OUTPATIENT)
Dept: FAMILY MEDICINE | Facility: CLINIC | Age: 43
End: 2018-04-04

## 2018-04-09 ENCOUNTER — PATIENT OUTREACH (OUTPATIENT)
Dept: ADMINISTRATIVE | Facility: HOSPITAL | Age: 43
End: 2018-04-09

## 2018-04-10 ENCOUNTER — OFFICE VISIT (OUTPATIENT)
Dept: FAMILY MEDICINE | Facility: CLINIC | Age: 43
End: 2018-04-10
Payer: COMMERCIAL

## 2018-04-10 VITALS
HEART RATE: 69 BPM | WEIGHT: 166 LBS | OXYGEN SATURATION: 98 % | HEIGHT: 69 IN | TEMPERATURE: 98 F | SYSTOLIC BLOOD PRESSURE: 108 MMHG | DIASTOLIC BLOOD PRESSURE: 68 MMHG | BODY MASS INDEX: 24.59 KG/M2

## 2018-04-10 DIAGNOSIS — F90.9 ADULT ADHD: Primary | ICD-10-CM

## 2018-04-10 DIAGNOSIS — M77.12 LATERAL EPICONDYLITIS OF LEFT ELBOW: ICD-10-CM

## 2018-04-10 PROCEDURE — 99999 PR PBB SHADOW E&M-EST. PATIENT-LVL III: CPT | Mod: PBBFAC,,, | Performed by: FAMILY MEDICINE

## 2018-04-10 PROCEDURE — 99213 OFFICE O/P EST LOW 20 MIN: CPT | Mod: S$GLB,,, | Performed by: FAMILY MEDICINE

## 2018-04-10 RX ORDER — DEXTROAMPHETAMINE SACCHARATE, AMPHETAMINE ASPARTATE MONOHYDRATE, DEXTROAMPHETAMINE SULFATE AND AMPHETAMINE SULFATE 6.25; 6.25; 6.25; 6.25 MG/1; MG/1; MG/1; MG/1
25 CAPSULE, EXTENDED RELEASE ORAL EVERY MORNING
COMMUNITY
End: 2018-04-10 | Stop reason: SDUPTHER

## 2018-04-10 RX ORDER — DEXTROAMPHETAMINE SACCHARATE, AMPHETAMINE ASPARTATE MONOHYDRATE, DEXTROAMPHETAMINE SULFATE AND AMPHETAMINE SULFATE 6.25; 6.25; 6.25; 6.25 MG/1; MG/1; MG/1; MG/1
25 CAPSULE, EXTENDED RELEASE ORAL EVERY MORNING
Qty: 30 CAPSULE | Refills: 0 | Status: SHIPPED | OUTPATIENT
Start: 2018-05-10 | End: 2018-06-28 | Stop reason: SDUPTHER

## 2018-04-10 RX ORDER — VALACYCLOVIR HYDROCHLORIDE 1 G/1
1 TABLET, FILM COATED ORAL DAILY
COMMUNITY
Start: 2018-04-05

## 2018-04-10 RX ORDER — DEXTROAMPHETAMINE SACCHARATE, AMPHETAMINE ASPARTATE MONOHYDRATE, DEXTROAMPHETAMINE SULFATE AND AMPHETAMINE SULFATE 6.25; 6.25; 6.25; 6.25 MG/1; MG/1; MG/1; MG/1
25 CAPSULE, EXTENDED RELEASE ORAL EVERY MORNING
Qty: 30 CAPSULE | Refills: 0 | Status: SHIPPED | OUTPATIENT
Start: 2018-04-10 | End: 2018-06-28 | Stop reason: SDUPTHER

## 2018-04-10 RX ORDER — DEXTROAMPHETAMINE SACCHARATE, AMPHETAMINE ASPARTATE MONOHYDRATE, DEXTROAMPHETAMINE SULFATE AND AMPHETAMINE SULFATE 6.25; 6.25; 6.25; 6.25 MG/1; MG/1; MG/1; MG/1
25 CAPSULE, EXTENDED RELEASE ORAL EVERY MORNING
Qty: 30 CAPSULE | Refills: 0 | Status: SHIPPED | OUTPATIENT
Start: 2018-06-10 | End: 2018-06-28 | Stop reason: SDUPTHER

## 2018-04-10 NOTE — PROGRESS NOTES
Chief Complaint:    Chief Complaint   Patient presents with    Medication Refill       History of Present Illness:    Pt is here for f/u of ADD/ADHD. Doing well, no side effect from the medications, meds helping him concentrate and do what needs done.      ROS:  Review of Systems   Constitutional: Negative for activity change, appetite change and unexpected weight change.   Cardiovascular: Negative for palpitations.   Gastrointestinal: Negative for abdominal pain.   Musculoskeletal: Negative for myalgias.   Neurological: Negative for dizziness, tremors, light-headedness and headaches.   Psychiatric/Behavioral: Negative for agitation, behavioral problems, confusion, decreased concentration, dysphoric mood, hallucinations, sleep disturbance and suicidal ideas. The patient is not nervous/anxious and is not hyperactive.      Past Medical History:   Diagnosis Date    DDD (degenerative disc disease), lumbar     Fatty liver     Nicotine abuse        Social History:  Social History     Social History    Marital status:      Spouse name: N/A    Number of children: N/A    Years of education: N/A     Occupational History     Homemaker     Social History Main Topics    Smoking status: Former Smoker     Types: Cigarettes     Quit date: 10/7/2015    Smokeless tobacco: Never Used    Alcohol use 0.0 oz/week      Comment: occasionally     Drug use: No    Sexual activity: Yes     Partners: Male     Birth control/ protection: None     Other Topics Concern    None     Social History Narrative    None       Family History:   family history includes Breast cancer in her maternal grandmother and mother; Cancer in her maternal grandmother; Diabetes in her cousin; Heart attack in her maternal grandmother; Hypertension in her maternal grandmother.    Health Maintenance   Topic Date Due    Influenza Vaccine  08/01/2017    Mammogram  08/30/2018    Pap Smear with HPV Cotest  11/11/2018    TETANUS VACCINE  03/15/2023  "   Lipid Panel  Completed       Physical Exam:    Vital Signs  Temp: 97.6 °F (36.4 °C)  Temp src: Tympanic  Pulse: 69  SpO2: 98 %  BP: 108/68  BP Location: Left arm  Patient Position: Sitting  Pain Score: 0-No pain  Height and Weight  Height: 5' 9" (175.3 cm)  Weight: 75.3 kg (166 lb 0.1 oz)  BSA (Calculated - sq m): 1.91 sq meters  BMI (Calculated): 24.6  Weight in (lb) to have BMI = 25: 168.9]     Physical Exam   Constitutional: She appears well-developed.   HENT:   Mouth/Throat: Oropharynx is clear and moist.   Eyes: Conjunctivae are normal. Pupils are equal, round, and reactive to light.   Neck: Normal range of motion.   Cardiovascular: Normal rate, regular rhythm and normal heart sounds.    Pulmonary/Chest: Breath sounds normal.   Musculoskeletal: Normal range of motion.  tenderness to palpation of the lateral epicondylar tendon on the left   Neurological: She is alert. Coordination normal.   Psychiatric: She has a normal mood and affect. Her behavior is normal. Judgment and thought content normal.       Body mass index is 24.51 kg/m².      Assessment:      ICD-10-CM ICD-9-CM   1. Adult ADHD F90.9 314.01   2. Lateral epicondylitis of left elbow M77.12 726.32         Plan:  Please continue current medications.    verified and 3 Rxs issued  Take Mobic and ice.  Patient refused steroid injection.  F/u 3 mos      No orders of the defined types were placed in this encounter.      Current Outpatient Prescriptions   Medication Sig Dispense Refill    cetirizine (ZYRTEC) 10 MG tablet Take 1 tablet (10 mg total) by mouth every evening. 30 tablet 5    dextroamphetamine-amphetamine (ADDERALL XR) 25 MG 24 hr capsule Take 1 capsule (25 mg total) by mouth every morning. 30 capsule 0    [START ON 5/10/2018] dextroamphetamine-amphetamine (ADDERALL XR) 25 MG 24 hr capsule Take 1 capsule (25 mg total) by mouth every morning. 30 capsule 0    [START ON 6/10/2018] dextroamphetamine-amphetamine (ADDERALL XR) 25 MG 24 hr " capsule Take 1 capsule (25 mg total) by mouth every morning. 30 capsule 0    fluticasone (FLONASE) 50 mcg/actuation nasal spray 2 sprays by Each Nare route once daily. 16 g 11    valACYclovir (VALTREX) 1000 MG tablet Take 1 tablet by mouth once daily.       No current facility-administered medications for this visit.        Medications Discontinued During This Encounter   Medication Reason    dextroamphetamine-amphetamine (ADDERALL XR) 25 MG 24 hr capsule Reorder    dextroamphetamine-amphetamine (ADDERALL XR) 25 MG 24 hr capsule Reorder    dextroamphetamine-amphetamine (ADDERALL XR) 25 MG 24 hr capsule Reorder             Jennifer Summers MD

## 2018-06-28 ENCOUNTER — OFFICE VISIT (OUTPATIENT)
Dept: FAMILY MEDICINE | Facility: CLINIC | Age: 43
End: 2018-06-28
Payer: COMMERCIAL

## 2018-06-28 ENCOUNTER — HOSPITAL ENCOUNTER (OUTPATIENT)
Dept: RADIOLOGY | Facility: HOSPITAL | Age: 43
Discharge: HOME | End: 2018-06-28
Attending: FAMILY MEDICINE
Payer: COMMERCIAL

## 2018-06-28 VITALS
DIASTOLIC BLOOD PRESSURE: 74 MMHG | BODY MASS INDEX: 24.84 KG/M2 | SYSTOLIC BLOOD PRESSURE: 122 MMHG | OXYGEN SATURATION: 100 % | TEMPERATURE: 97 F | HEART RATE: 80 BPM | WEIGHT: 168.19 LBS

## 2018-06-28 DIAGNOSIS — F90.9 ADULT ADHD: Primary | ICD-10-CM

## 2018-06-28 DIAGNOSIS — M51.36 DDD (DEGENERATIVE DISC DISEASE), LUMBAR: ICD-10-CM

## 2018-06-28 DIAGNOSIS — M79.641 PAIN OF RIGHT HAND: ICD-10-CM

## 2018-06-28 LAB
AMPHET+METHAMPHET UR QL: NORMAL
BARBITURATES UR QL SCN>200 NG/ML: NEGATIVE
BENZODIAZ UR QL SCN>200 NG/ML: NEGATIVE
BZE UR QL SCN: NEGATIVE
CANNABINOIDS UR QL SCN: NEGATIVE
CREAT UR-MCNC: 113 MG/DL
ETHANOL UR-MCNC: <10 MG/DL
METHADONE UR QL SCN>300 NG/ML: NEGATIVE
OPIATES UR QL SCN: NEGATIVE
PCP UR QL SCN>25 NG/ML: NEGATIVE
TOXICOLOGY INFORMATION: NORMAL

## 2018-06-28 PROCEDURE — 3008F BODY MASS INDEX DOCD: CPT | Mod: CPTII,S$GLB,, | Performed by: FAMILY MEDICINE

## 2018-06-28 PROCEDURE — 99214 OFFICE O/P EST MOD 30 MIN: CPT | Mod: S$GLB,,, | Performed by: FAMILY MEDICINE

## 2018-06-28 PROCEDURE — 73130 X-RAY EXAM OF HAND: CPT | Mod: 50,TC,FY,PO

## 2018-06-28 PROCEDURE — 80307 DRUG TEST PRSMV CHEM ANLYZR: CPT

## 2018-06-28 PROCEDURE — 99999 PR PBB SHADOW E&M-EST. PATIENT-LVL III: CPT | Mod: PBBFAC,,, | Performed by: FAMILY MEDICINE

## 2018-06-28 PROCEDURE — 73130 X-RAY EXAM OF HAND: CPT | Mod: 26,50,, | Performed by: RADIOLOGY

## 2018-06-28 RX ORDER — DEXTROAMPHETAMINE SACCHARATE, AMPHETAMINE ASPARTATE MONOHYDRATE, DEXTROAMPHETAMINE SULFATE AND AMPHETAMINE SULFATE 6.25; 6.25; 6.25; 6.25 MG/1; MG/1; MG/1; MG/1
25 CAPSULE, EXTENDED RELEASE ORAL EVERY MORNING
Qty: 30 CAPSULE | Refills: 0 | Status: SHIPPED | OUTPATIENT
Start: 2018-07-10 | End: 2018-09-10

## 2018-06-28 RX ORDER — DEXTROAMPHETAMINE SACCHARATE, AMPHETAMINE ASPARTATE MONOHYDRATE, DEXTROAMPHETAMINE SULFATE AND AMPHETAMINE SULFATE 6.25; 6.25; 6.25; 6.25 MG/1; MG/1; MG/1; MG/1
25 CAPSULE, EXTENDED RELEASE ORAL EVERY MORNING
Qty: 30 CAPSULE | Refills: 0 | Status: SHIPPED | OUTPATIENT
Start: 2018-08-10 | End: 2018-09-10

## 2018-06-28 RX ORDER — DEXTROAMPHETAMINE SACCHARATE, AMPHETAMINE ASPARTATE MONOHYDRATE, DEXTROAMPHETAMINE SULFATE AND AMPHETAMINE SULFATE 6.25; 6.25; 6.25; 6.25 MG/1; MG/1; MG/1; MG/1
25 CAPSULE, EXTENDED RELEASE ORAL EVERY MORNING
Qty: 30 CAPSULE | Refills: 0 | Status: SHIPPED | OUTPATIENT
Start: 2018-09-10 | End: 2018-09-10

## 2018-06-28 RX ORDER — CYCLOBENZAPRINE HCL 5 MG
5 TABLET ORAL NIGHTLY PRN
Qty: 30 TABLET | Refills: 0 | Status: SHIPPED | OUTPATIENT
Start: 2018-06-28 | End: 2018-12-28

## 2018-06-28 RX ORDER — CYCLOBENZAPRINE HCL 5 MG
5 TABLET ORAL NIGHTLY PRN
COMMUNITY
End: 2018-06-28 | Stop reason: SDUPTHER

## 2018-06-28 NOTE — PROGRESS NOTES
Chief Complaint:    Chief Complaint   Patient presents with    Follow-up       History of Present Illness:   patient presents today for ADHD medication refill she says is helping him concentrate she takes it every single day and does not miss a single day because otherwise she has undermining gets really scattered.  She is complaining of hand pain mostly to involving the right PIP joints since she is right handed and uses the and a lot.    She also suffers with chronic episodic back pain without any radiation currently seeing a chiropractor and doing some physical therapy.      ROS:  Review of Systems   Constitutional: Negative for activity change, chills, fatigue, fever and unexpected weight change.   HENT: Negative for congestion, ear discharge, ear pain, hearing loss, postnasal drip and rhinorrhea.    Eyes: Negative for pain and visual disturbance.   Respiratory: Negative for cough, chest tightness and shortness of breath.    Cardiovascular: Negative for chest pain and palpitations.   Gastrointestinal: Negative for abdominal pain, diarrhea and vomiting.   Endocrine: Negative for heat intolerance.   Genitourinary: Negative for dysuria, flank pain, frequency and hematuria.   Musculoskeletal: Negative for back pain, gait problem and neck pain.   Skin: Negative for color change and rash.   Neurological: Negative for dizziness, tremors, seizures, numbness and headaches.   Psychiatric/Behavioral: Negative for agitation, hallucinations, self-injury, sleep disturbance and suicidal ideas. The patient is not nervous/anxious.        Past Medical History:   Diagnosis Date    DDD (degenerative disc disease), lumbar     Fatty liver     Nicotine abuse        Social History:  Social History     Social History    Marital status:      Spouse name: N/A    Number of children: N/A    Years of education: N/A     Occupational History     Homemaker     Social History Main Topics    Smoking status: Former Smoker      Types: Cigarettes     Quit date: 10/7/2015    Smokeless tobacco: Never Used    Alcohol use 0.0 oz/week      Comment: occasionally     Drug use: No    Sexual activity: Yes     Partners: Male     Birth control/ protection: None     Other Topics Concern    None     Social History Narrative    None       Family History:   family history includes Breast cancer in her maternal grandmother and mother; Cancer in her maternal grandmother; Diabetes in her cousin; Heart attack in her maternal grandmother; Hypertension in her maternal grandmother.    Health Maintenance   Topic Date Due    Influenza Vaccine  08/01/2018    Mammogram  08/30/2018    Pap Smear with HPV Cotest  11/11/2018    TETANUS VACCINE  03/15/2023    Lipid Panel  Completed       Physical Exam:    Vital Signs  Temp: 97.4 °F (36.3 °C)  Temp src: Tympanic  Pulse: 80  SpO2: 100 %  BP: 122/74  BP Location: Left arm  Patient Position: Sitting  Pain Score: 0-No pain  Height and Weight  Weight: 76.3 kg (168 lb 3.4 oz)]    Body mass index is 24.84 kg/m².    Physical Exam   Constitutional: She is oriented to person, place, and time. She appears well-developed.   HENT:   Mouth/Throat: Oropharynx is clear and moist.   Eyes: Conjunctivae are normal. Pupils are equal, round, and reactive to light.   Neck: Normal range of motion. Neck supple.   Cardiovascular: Normal rate, regular rhythm and normal heart sounds.    No murmur heard.  Pulmonary/Chest: Effort normal and breath sounds normal. No respiratory distress. She has no wheezes. She has no rales. She exhibits no tenderness.   Abdominal: Soft. She exhibits no distension and no mass. There is no tenderness. There is no guarding.   Musculoskeletal: She exhibits no edema or tenderness.        Hands:  Lymphadenopathy:     She has no cervical adenopathy.   Neurological: She is alert and oriented to person, place, and time. She has normal reflexes.   Skin: Skin is warm and dry.   Psychiatric: She has a normal mood and  affect. Her behavior is normal. Judgment and thought content normal.       Results for orders placed or performed in visit on 01/23/18   Throat culture   Result Value Ref Range    RESPIRATORY CULTURE - THROAT Normal respiratory deborah          Lab Results   Component Value Date    HGBA1C 5.1 12/15/2016       Assessment:      ICD-10-CM ICD-9-CM   1. Adult ADHD F90.9 314.01   2. Pain of right hand M79.641 729.5   3. DDD (degenerative disc disease), lumbar M51.36 722.52         Plan:      Patient's  looked up 3 prescriptions for Adderall sent to the pharmacy.  Will do urine drug screen   Will check a for inflammatory arthritis if positive she will be referred to Rheumatology   Chronic episodic back pain continue the physical therapy      Orders Placed This Encounter   Procedures    X-Ray Hand 3 View Bilateral    TOXICOLOGY SCREEN, URINE, RANDOM (COMPLIANCE)    CYCLIC CITRUL PEPTIDE ANTIBODY, IGG    Sedimentation rate    RHEUMATOID FACTOR    URIC ACID       Current Outpatient Prescriptions   Medication Sig Dispense Refill    cetirizine (ZYRTEC) 10 MG tablet Take 1 tablet (10 mg total) by mouth every evening. 30 tablet 5    cyclobenzaprine (FLEXERIL) 5 MG tablet Take 1 tablet (5 mg total) by mouth nightly as needed for Muscle spasms. 30 tablet 0    [START ON 7/10/2018] dextroamphetamine-amphetamine (ADDERALL XR) 25 MG 24 hr capsule Take 1 capsule (25 mg total) by mouth every morning. 30 capsule 0    [START ON 8/10/2018] dextroamphetamine-amphetamine (ADDERALL XR) 25 MG 24 hr capsule Take 1 capsule (25 mg total) by mouth every morning. 30 capsule 0    [START ON 9/10/2018] dextroamphetamine-amphetamine (ADDERALL XR) 25 MG 24 hr capsule Take 1 capsule (25 mg total) by mouth every morning. 30 capsule 0    fluticasone (FLONASE) 50 mcg/actuation nasal spray 2 sprays by Each Nare route once daily. 16 g 11    valACYclovir (VALTREX) 1000 MG tablet Take 1 tablet by mouth once daily.       No current  facility-administered medications for this visit.        Medications Discontinued During This Encounter   Medication Reason    cyclobenzaprine (FLEXERIL) 5 MG tablet Reorder    dextroamphetamine-amphetamine (ADDERALL XR) 25 MG 24 hr capsule Reorder    dextroamphetamine-amphetamine (ADDERALL XR) 25 MG 24 hr capsule Reorder    dextroamphetamine-amphetamine (ADDERALL XR) 25 MG 24 hr capsule Reorder       No Follow-up on file.      Jennifer Summers MD

## 2018-06-29 ENCOUNTER — TELEPHONE (OUTPATIENT)
Dept: FAMILY MEDICINE | Facility: CLINIC | Age: 43
End: 2018-06-29

## 2018-06-29 ENCOUNTER — PATIENT MESSAGE (OUTPATIENT)
Dept: FAMILY MEDICINE | Facility: CLINIC | Age: 43
End: 2018-06-29

## 2018-06-29 NOTE — TELEPHONE ENCOUNTER
Returned pt's call and states she read the results on patient portal. Pt denies questions/complaints at this time.

## 2018-06-29 NOTE — TELEPHONE ENCOUNTER
----- Message from Albert Evans sent at 6/29/2018 12:04 PM CDT -----  Contact: [place] [phone]  Patient called about her labs.

## 2018-08-17 ENCOUNTER — PATIENT MESSAGE (OUTPATIENT)
Dept: FAMILY MEDICINE | Facility: CLINIC | Age: 43
End: 2018-08-17

## 2018-08-19 ENCOUNTER — PATIENT MESSAGE (OUTPATIENT)
Dept: FAMILY MEDICINE | Facility: CLINIC | Age: 43
End: 2018-08-19

## 2018-08-20 ENCOUNTER — TELEPHONE (OUTPATIENT)
Dept: FAMILY MEDICINE | Facility: CLINIC | Age: 43
End: 2018-08-20

## 2018-08-20 DIAGNOSIS — Z12.31 ENCOUNTER FOR SCREENING MAMMOGRAM FOR BREAST CANCER: Primary | ICD-10-CM

## 2018-08-25 PROCEDURE — 29105 APPLICATION LONG ARM SPLINT: CPT | Mod: RT

## 2018-08-25 PROCEDURE — 96372 THER/PROPH/DIAG INJ SC/IM: CPT | Mod: 59

## 2018-08-25 PROCEDURE — 99283 EMERGENCY DEPT VISIT LOW MDM: CPT | Mod: 25

## 2018-08-26 ENCOUNTER — HOSPITAL ENCOUNTER (EMERGENCY)
Facility: HOSPITAL | Age: 43
Discharge: HOME | End: 2018-08-26
Attending: EMERGENCY MEDICINE
Payer: COMMERCIAL

## 2018-08-26 VITALS
HEIGHT: 69 IN | SYSTOLIC BLOOD PRESSURE: 118 MMHG | RESPIRATION RATE: 17 BRPM | BODY MASS INDEX: 24.91 KG/M2 | WEIGHT: 168.19 LBS | TEMPERATURE: 98 F | DIASTOLIC BLOOD PRESSURE: 67 MMHG | HEART RATE: 68 BPM | OXYGEN SATURATION: 100 %

## 2018-08-26 DIAGNOSIS — S42.401A CLOSED FRACTURE OF RIGHT ELBOW, INITIAL ENCOUNTER: Primary | ICD-10-CM

## 2018-08-26 DIAGNOSIS — T14.90XA TRAUMA: ICD-10-CM

## 2018-08-26 PROCEDURE — 63600175 PHARM REV CODE 636 W HCPCS: Performed by: EMERGENCY MEDICINE

## 2018-08-26 PROCEDURE — 25000003 PHARM REV CODE 250: Performed by: EMERGENCY MEDICINE

## 2018-08-26 RX ORDER — ONDANSETRON 2 MG/ML
4 INJECTION INTRAMUSCULAR; INTRAVENOUS
Status: COMPLETED | OUTPATIENT
Start: 2018-08-26 | End: 2018-08-26

## 2018-08-26 RX ORDER — ONDANSETRON 2 MG/ML
4 INJECTION INTRAMUSCULAR; INTRAVENOUS
Status: DISCONTINUED | OUTPATIENT
Start: 2018-08-26 | End: 2018-08-26

## 2018-08-26 RX ORDER — KETOROLAC TROMETHAMINE 30 MG/ML
60 INJECTION, SOLUTION INTRAMUSCULAR; INTRAVENOUS
Status: COMPLETED | OUTPATIENT
Start: 2018-08-26 | End: 2018-08-26

## 2018-08-26 RX ORDER — MORPHINE SULFATE 4 MG/ML
4 INJECTION, SOLUTION INTRAMUSCULAR; INTRAVENOUS
Status: DISCONTINUED | OUTPATIENT
Start: 2018-08-26 | End: 2018-08-26

## 2018-08-26 RX ORDER — ONDANSETRON 4 MG/1
4 TABLET, ORALLY DISINTEGRATING ORAL
Status: DISCONTINUED | OUTPATIENT
Start: 2018-08-26 | End: 2018-08-26

## 2018-08-26 RX ORDER — ONDANSETRON 4 MG/1
4 TABLET, FILM COATED ORAL EVERY 6 HOURS
Qty: 12 TABLET | Refills: 0 | Status: SHIPPED | OUTPATIENT
Start: 2018-08-26 | End: 2018-08-28 | Stop reason: ALTCHOICE

## 2018-08-26 RX ORDER — MORPHINE SULFATE 4 MG/ML
4 INJECTION, SOLUTION INTRAMUSCULAR; INTRAVENOUS
Status: COMPLETED | OUTPATIENT
Start: 2018-08-26 | End: 2018-08-26

## 2018-08-26 RX ORDER — ONDANSETRON 4 MG/1
4 TABLET, ORALLY DISINTEGRATING ORAL
Status: COMPLETED | OUTPATIENT
Start: 2018-08-26 | End: 2018-08-26

## 2018-08-26 RX ORDER — NAPROXEN 375 MG/1
375 TABLET ORAL 2 TIMES DAILY WITH MEALS
Qty: 60 TABLET | Refills: 0 | Status: SHIPPED | OUTPATIENT
Start: 2018-08-26 | End: 2018-08-28 | Stop reason: ALTCHOICE

## 2018-08-26 RX ORDER — KETOROLAC TROMETHAMINE 30 MG/ML
15 INJECTION, SOLUTION INTRAMUSCULAR; INTRAVENOUS
Status: DISCONTINUED | OUTPATIENT
Start: 2018-08-26 | End: 2018-08-26

## 2018-08-26 RX ORDER — HYDROCODONE BITARTRATE AND ACETAMINOPHEN 7.5; 325 MG/1; MG/1
1 TABLET ORAL EVERY 4 HOURS PRN
Qty: 18 TABLET | Refills: 0 | Status: SHIPPED | OUTPATIENT
Start: 2018-08-26 | End: 2018-08-28 | Stop reason: ALTCHOICE

## 2018-08-26 RX ORDER — KETOROLAC TROMETHAMINE 30 MG/ML
60 INJECTION, SOLUTION INTRAMUSCULAR; INTRAVENOUS
Status: DISCONTINUED | OUTPATIENT
Start: 2018-08-26 | End: 2018-08-26

## 2018-08-26 RX ADMIN — MORPHINE SULFATE 4 MG: 4 INJECTION INTRAVENOUS at 01:08

## 2018-08-26 RX ADMIN — KETOROLAC TROMETHAMINE 60 MG: 60 INJECTION, SOLUTION INTRAMUSCULAR at 02:08

## 2018-08-26 RX ADMIN — ONDANSETRON 4 MG: 2 INJECTION, SOLUTION INTRAMUSCULAR; INTRAVENOUS at 01:08

## 2018-08-26 RX ADMIN — ONDANSETRON 4 MG: 4 TABLET, ORALLY DISINTEGRATING ORAL at 02:08

## 2018-08-26 NOTE — ED PROVIDER NOTES
SCRIBE #1 NOTE: I, Marlin Fitch, am scribing for, and in the presence of, Migdalia Martin Do, MD. I have scribed the entire note.      History      Chief Complaint   Patient presents with    Arm Pain     pt states she slipped and fell and landed on R arm/elbow       Review of patient's allergies indicates:   Allergen Reactions    Penicillins Rash, Itching and Swelling        HPI   HPI    2018, 12:13 AM   History obtained from the patient      History of Present Illness: [place] Orosco is a 43 y.o. female patient who presents to the Emergency Department for R elbow pain which onset suddenly PTA. Symptoms are constant and moderate in severity. Pt reports she was getting into the bathtub when she slipped and fell on her R arm. No mitigating or exacerbating factors reported. Associated sxs include abrasions to head. Patient denies any CP, SOB, HA, LOC, back pain, neck pain, abd pain, and all other sxs at this time. No prior Tx. No further complaints or concerns at this time.       Arrival mode: Personal vehicle     PCP: Jennifer Summers MD       Past Medical History:  Past Medical History:   Diagnosis Date    DDD (degenerative disc disease), lumbar     Fatty liver     Nicotine abuse        Past Surgical History:  Past Surgical History:   Procedure Laterality Date    BREAST SURGERY Bilateral          SECTION      LAPAROSCOPIC GASTRIC BANDING           Family History:  Family History   Problem Relation Age of Onset    Cancer Maternal Grandmother     Hypertension Maternal Grandmother     Heart attack Maternal Grandmother     Breast cancer Maternal Grandmother     Diabetes Cousin     Breast cancer Mother        Social History:  Social History     Tobacco Use    Smoking status: Former Smoker     Types: Cigarettes     Last attempt to quit: 10/7/2015     Years since quittin.8    Smokeless tobacco: Never Used   Substance and Sexual Activity    Alcohol use: Yes     Alcohol/week: 0.0 oz      Comment: occasionally     Drug use: No    Sexual activity: Yes     Partners: Male     Birth control/protection: None       ROS   Review of Systems   Constitutional: Negative for chills and fever.   HENT: Negative for congestion and sore throat.    Respiratory: Negative for cough and shortness of breath.    Cardiovascular: Negative for chest pain.   Gastrointestinal: Negative for abdominal pain, diarrhea, nausea and vomiting.   Genitourinary: Negative for dysuria.   Musculoskeletal: Negative for back pain and neck pain.        (+) R elbow pain   Skin: Negative for rash.        (+) Abrasion to head   Neurological: Negative for dizziness, syncope, weakness, numbness and headaches.   Hematological: Does not bruise/bleed easily.   All other systems reviewed and are negative.    Physical Exam      Initial Vitals [08/25/18 2353]   BP Pulse Resp Temp SpO2   (!) 141/67 64 20 97.4 °F (36.3 °C) 100 %      MAP       --          Physical Exam  Nursing Notes and Vital Signs Reviewed.  Constitutional: Patient is in mild distress. Well-developed and well-nourished.  Head: Normocephalic. Cherri size abrasion to scalp.  Eyes: PERRL. EOM intact. Conjunctivae are not pale. No scleral icterus.  ENT: Mucous membranes are moist. Oropharynx is clear and symmetric.    Neck: Supple. No cervical midline bony tenderness, deformities, or step-offs.   Back: No midline bony tenderness, deformities, or step-offs of the T-spine or L-spine. Skin appears normal without abrasions or bruising. No erythema, induration, or fluctuance.   Cardiovascular: Regular rate. Regular rhythm. No murmurs, rubs, or gallops. Distal pulses are 2+ and symmetric.  Pulmonary/Chest: No respiratory distress. Clear to auscultation bilaterally. No wheezing or rales.  Abdominal: Soft and non-distended.  There is no tenderness.  No rebound, guarding, or rigidity. Good bowel sounds.  Genitourinary: No CVA tenderness  Musculoskeletal: Moves all extremities. No obvious  "deformities. No edema. No calf tenderness.  RUE: has no evident deformity. Mild swelling to lateral epicondy. Pt doesn't want to flex or extend elbow secondary to pain. No TTP to R shoulder but pt does not want to move it due to pain in elbow. No TTP or swelling to wrist. Full ROM. Cap refill distally is <2 seconds. Radial pulses are equal and 2+ bilaterally. No motor deficit. No distal sensory deficit.  Skin: Warm and dry.  Neurological: Awake and alert. Appropriate for age. No strength deficit; equal and 5/5 in bilateral upper and lower extremities. No light touch sensory deficit. DTRs 2+ and equal. Normal gait. No acute focal neurological deficits noted.  Psychiatric: Normal affect. Good eye contact. Appropriate in content.    ED Course    Splint Application  Date/Time: 8/26/2018 1:09 AM  Performed by: Migdalia Martin Do, MD  Authorized by: Migdalia Martin Do, MD   Location details: right elbow  Splint type: long arm  Post-procedure: The splinted body part was neurovascularly unchanged following the procedure.  Patient tolerance: Patient tolerated the procedure well with no immediate complications        ED Vital Signs:  Vitals:    08/25/18 2353 08/26/18 0200   BP: (!) 141/67 123/77   Pulse: 64 93   Resp: 20 18   Temp: 97.4 °F (36.3 °C)    TempSrc: Oral    SpO2: 100% 99%   Weight: 76.3 kg (168 lb 3.4 oz)    Height: 5' 9" (1.753 m)        Abnormal Lab Results:  Labs Reviewed   PREGNANCY TEST, URINE RAPID        All Lab Results:  Results for orders placed or performed in visit on 06/28/18   CYCLIC CITRUL PEPTIDE ANTIBODY, IGG   Result Value Ref Range    CCP Antibodies <0.5 <5.0 U/mL   Sedimentation rate   Result Value Ref Range    Sed Rate 1 0 - 20 mm/Hr   RHEUMATOID FACTOR   Result Value Ref Range    Rheumatoid Factor 12.0 0.0 - 15.0 IU/mL   URIC ACID   Result Value Ref Range    Uric Acid 3.9 2.4 - 5.7 mg/dL         Imaging Results:  Imaging Results          X-Ray Elbow Complete Right (In process)    Per ED " physician, pt's XR results questionable fracture on lateral view of distal humerus posteriorly. No posterior sail's sign.            X-Ray Shoulder Trauma Right (In process)    Per ED physician, pt's XR results no acute fracture or dislocation.                      The Emergency Provider reviewed the vital signs and test results, which are outlined above.    ED Discussion     2:59 AM Reassessed pt at this time.  Pt is awake, alert, and in NAD at this time. Discussed with pt all pertinent ED information and results. Discussed pt dx and plan of tx. Gave pt all f/u and return to the ED instructions. All questions and concerns were addressed at this time. Pt expresses understanding of information and instructions, and is comfortable with plan to discharge. Pt is stable for discharge.    Trauma precautions were discussed with patient and/or family/caretaker; I do not specifically detect any abdominal, thoracic, CNS, orthopedic, or other emergent or life threatening condition and that patient is safe to be discharged.  It was also discussed that despite an unrevealing examination and negative radiographic examination for serious or life threatening injury, these conditions may still exist.  As such, patient should return to ED immediately should they experience, severe or worsening pain, shortness of breath, abdominal pain, headache, vomiting, or any other concern.  It was also discussed that not infrequently, injuries may not be diagnosed during the initial ED visit (such as fractures) and that if the patient discovers a new area of concern, a new area of injury that was not evaluated in the ED, they should return for evaluation as they may have an injury that requires treatment.      ED Medication(s):  Medications   morphine injection 4 mg (4 mg Intramuscular Given 8/26/18 0104)   ondansetron disintegrating tablet 4 mg (4 mg Oral Given 8/26/18 0209)   ketorolac injection 60 mg (60 mg Intramuscular Given 8/26/18 0208)        New Prescriptions    HYDROCODONE-ACETAMINOPHEN (NORCO) 7.5-325 MG PER TABLET    Take 1 tablet by mouth every 4 (four) hours as needed for Pain.    NAPROXEN (NAPROSYN) 375 MG TABLET    Take 1 tablet (375 mg total) by mouth 2 (two) times daily with meals.    ONDANSETRON (ZOFRAN) 4 MG TABLET    Take 1 tablet (4 mg total) by mouth every 6 (six) hours.             Medical Decision Making    Medical Decision Making:   Clinical Tests:   Lab Tests: Ordered and Reviewed  Radiological Study: Ordered and Reviewed           Scribe Attestation:   Scribe #1: I performed the above scribed service and the documentation accurately describes the services I performed. I attest to the accuracy of the note.    Attending:   Physician Attestation Statement for Scribe #1: I, Migdalia Martin Do, MD, personally performed the services described in this documentation, as scribed by Marlin Fitch, in my presence, and it is both accurate and complete.          Clinical Impression       ICD-10-CM ICD-9-CM   1. Closed fracture of right elbow, initial encounter S42.401A 812.40   2. Trauma T14.90XA 959.9       Disposition:   Disposition: Discharged  Condition: Stable         Migdalia Martin Do, MD  08/26/18 0554

## 2018-08-27 ENCOUNTER — PATIENT MESSAGE (OUTPATIENT)
Dept: FAMILY MEDICINE | Facility: CLINIC | Age: 43
End: 2018-08-27

## 2018-08-28 ENCOUNTER — OFFICE VISIT (OUTPATIENT)
Dept: FAMILY MEDICINE | Facility: CLINIC | Age: 43
End: 2018-08-28
Payer: COMMERCIAL

## 2018-08-28 VITALS
HEART RATE: 97 BPM | DIASTOLIC BLOOD PRESSURE: 68 MMHG | WEIGHT: 171.94 LBS | BODY MASS INDEX: 25.47 KG/M2 | HEIGHT: 69 IN | TEMPERATURE: 99 F | SYSTOLIC BLOOD PRESSURE: 120 MMHG | OXYGEN SATURATION: 100 %

## 2018-08-28 DIAGNOSIS — G47.9 SLEEPING DIFFICULTY: ICD-10-CM

## 2018-08-28 DIAGNOSIS — F90.9 ADULT ADHD: Primary | ICD-10-CM

## 2018-08-28 DIAGNOSIS — T88.7XXA MEDICATION SIDE EFFECTS: ICD-10-CM

## 2018-08-28 PROCEDURE — 99999 PR PBB SHADOW E&M-EST. PATIENT-LVL III: CPT | Mod: PBBFAC,,, | Performed by: FAMILY MEDICINE

## 2018-08-28 PROCEDURE — 99214 OFFICE O/P EST MOD 30 MIN: CPT | Mod: S$GLB,,, | Performed by: FAMILY MEDICINE

## 2018-08-28 PROCEDURE — 3008F BODY MASS INDEX DOCD: CPT | Mod: CPTII,S$GLB,, | Performed by: FAMILY MEDICINE

## 2018-08-28 NOTE — PROGRESS NOTES
Chief Complaint:    Chief Complaint   Patient presents with    Medication Problem       History of Present Illness:    She presents today she says she has been having some side effects from Adderall when she takes it for few hours she feels like she is on speed and then the effect wears off and she feels like she has been dumped really exhausted.  When she sleeps at night she has trouble pain because she feels like her brain is still speeding.  Her sleep habits are poor.  Adderall is the only medicine she has ever been on.    ROS:  Review of Systems   Constitutional: Negative for activity change, chills, fatigue, fever and unexpected weight change.   HENT: Negative for congestion, ear discharge, ear pain, hearing loss, postnasal drip and rhinorrhea.    Eyes: Negative for pain and visual disturbance.   Respiratory: Negative for cough, chest tightness and shortness of breath.    Cardiovascular: Negative for chest pain and palpitations.   Gastrointestinal: Negative for abdominal pain, diarrhea and vomiting.   Endocrine: Negative for heat intolerance.   Genitourinary: Negative for dysuria, flank pain, frequency and hematuria.   Musculoskeletal: Negative for back pain, gait problem and neck pain.   Skin: Negative for color change and rash.   Neurological: Negative for dizziness, tremors, seizures, numbness and headaches.   Psychiatric/Behavioral: Negative for agitation, hallucinations, self-injury, sleep disturbance and suicidal ideas. The patient is not nervous/anxious.        Past Medical History:   Diagnosis Date    DDD (degenerative disc disease), lumbar     Fatty liver     Nicotine abuse        Social History:  Social History     Socioeconomic History    Marital status:      Spouse name: None    Number of children: None    Years of education: None    Highest education level: None   Social Needs    Financial resource strain: None    Food insecurity - worry: None    Food insecurity - inability:  "None    Transportation needs - medical: None    Transportation needs - non-medical: None   Occupational History     Employer: homemaker   Tobacco Use    Smoking status: Former Smoker     Types: Cigarettes     Last attempt to quit: 10/7/2015     Years since quittin.8    Smokeless tobacco: Never Used   Substance and Sexual Activity    Alcohol use: Yes     Alcohol/week: 0.0 oz     Comment: occasionally     Drug use: No    Sexual activity: Yes     Partners: Male     Birth control/protection: None   Other Topics Concern    None   Social History Narrative    None       Family History:   family history includes Breast cancer in her maternal grandmother and mother; Cancer in her maternal grandmother; Diabetes in her cousin; Heart attack in her maternal grandmother; Hypertension in her maternal grandmother.    Health Maintenance   Topic Date Due    Influenza Vaccine  2018    Mammogram  2018    Pap Smear with HPV Cotest  2018    TETANUS VACCINE  03/15/2023    Lipid Panel  Completed       Physical Exam:    Vital Signs  Temp: 98.6 °F (37 °C)  Temp src: Tympanic  Pulse: 97  SpO2: 100 %  BP: 120/68  BP Location: Left arm  Patient Position: Sitting  Pain Score: 0-No pain  Height and Weight  Height: 5' 9" (175.3 cm)  Weight: 78 kg (171 lb 15.3 oz)  BSA (Calculated - sq m): 1.95 sq meters  BMI (Calculated): 25.4  Weight in (lb) to have BMI = 25: 168.9]    Body mass index is 25.39 kg/m².    Physical Exam   Constitutional: She is oriented to person, place, and time. She appears well-developed.   HENT:   Mouth/Throat: Oropharynx is clear and moist.   Eyes: Conjunctivae are normal. Pupils are equal, round, and reactive to light.   Neck: Normal range of motion. Neck supple.   Cardiovascular: Normal rate, regular rhythm and normal heart sounds.   No murmur heard.  Pulmonary/Chest: Effort normal and breath sounds normal. No respiratory distress. She has no wheezes. She has no rales. She exhibits no " tenderness.   Abdominal: Soft. She exhibits no distension and no mass. There is no tenderness. There is no guarding.   Musculoskeletal: She exhibits no edema or tenderness.   Lymphadenopathy:     She has no cervical adenopathy.   Neurological: She is alert and oriented to person, place, and time. She has normal reflexes.   Skin: Skin is warm and dry.   Psychiatric: She has a normal mood and affect. Her behavior is normal. Judgment and thought content normal.         Assessment:      ICD-10-CM ICD-9-CM   1. Adult ADHD F90.9 314.01   2. Sleeping difficulty G47.9 780.50   3. Medication side effects T88.7XXA 995.20         Plan:    It appears the patient is very since to medication effect, recommend that she discontinue Adderall.  She will be better off taking a lower dose of a different stimulant possibly Concerta 27 mg.  She says she has some existing pills from her daughters which a left over and she will try them and call me back.      No orders of the defined types were placed in this encounter.      Current Outpatient Medications   Medication Sig Dispense Refill    cetirizine (ZYRTEC) 10 MG tablet Take 1 tablet (10 mg total) by mouth every evening. 30 tablet 5    cyclobenzaprine (FLEXERIL) 5 MG tablet Take 1 tablet (5 mg total) by mouth nightly as needed for Muscle spasms. 30 tablet 0    dextroamphetamine-amphetamine (ADDERALL XR) 25 MG 24 hr capsule Take 1 capsule (25 mg total) by mouth every morning. 30 capsule 0    dextroamphetamine-amphetamine (ADDERALL XR) 25 MG 24 hr capsule Take 1 capsule (25 mg total) by mouth every morning. 30 capsule 0    [START ON 9/10/2018] dextroamphetamine-amphetamine (ADDERALL XR) 25 MG 24 hr capsule Take 1 capsule (25 mg total) by mouth every morning. 30 capsule 0    fluticasone (FLONASE) 50 mcg/actuation nasal spray 2 sprays by Each Nare route once daily. 16 g 11    NATURE-THROID 32.5 mg Tab Take 1 tablet by mouth once daily.  3    valACYclovir (VALTREX) 1000 MG tablet  Take 1 tablet by mouth once daily.       No current facility-administered medications for this visit.        Medications Discontinued During This Encounter   Medication Reason    HYDROcodone-acetaminophen (NORCO) 7.5-325 mg per tablet Therapy completed    naproxen (NAPROSYN) 375 MG tablet Therapy completed    ondansetron (ZOFRAN) 4 MG tablet Therapy completed       No Follow-up on file.      Jennifer Summers MD

## 2018-09-04 RX ORDER — LISDEXAMFETAMINE DIMESYLATE 30 MG/1
30 CAPSULE ORAL EVERY MORNING
Qty: 30 CAPSULE | Refills: 0 | Status: SHIPPED | OUTPATIENT
Start: 2018-09-04 | End: 2018-10-08 | Stop reason: SDUPTHER

## 2018-09-07 ENCOUNTER — PATIENT MESSAGE (OUTPATIENT)
Dept: FAMILY MEDICINE | Facility: CLINIC | Age: 43
End: 2018-09-07

## 2018-09-10 ENCOUNTER — OFFICE VISIT (OUTPATIENT)
Dept: FAMILY MEDICINE | Facility: CLINIC | Age: 43
End: 2018-09-10
Payer: COMMERCIAL

## 2018-09-10 VITALS
OXYGEN SATURATION: 99 % | TEMPERATURE: 98 F | WEIGHT: 168 LBS | BODY MASS INDEX: 24.88 KG/M2 | SYSTOLIC BLOOD PRESSURE: 108 MMHG | DIASTOLIC BLOOD PRESSURE: 68 MMHG | HEIGHT: 69 IN | HEART RATE: 82 BPM

## 2018-09-10 DIAGNOSIS — R04.0 BLEEDING FROM THE NOSE: Primary | ICD-10-CM

## 2018-09-10 DIAGNOSIS — F90.9 ADULT ADHD: ICD-10-CM

## 2018-09-10 DIAGNOSIS — S09.90XA INJURY OF HEAD, INITIAL ENCOUNTER: ICD-10-CM

## 2018-09-10 PROCEDURE — 3008F BODY MASS INDEX DOCD: CPT | Mod: CPTII,S$GLB,, | Performed by: FAMILY MEDICINE

## 2018-09-10 PROCEDURE — 99999 PR PBB SHADOW E&M-EST. PATIENT-LVL III: CPT | Mod: PBBFAC,,, | Performed by: FAMILY MEDICINE

## 2018-09-10 PROCEDURE — 99214 OFFICE O/P EST MOD 30 MIN: CPT | Mod: S$GLB,,, | Performed by: FAMILY MEDICINE

## 2018-09-10 NOTE — PROGRESS NOTES
Chief Complaint:    Chief Complaint   Patient presents with    Epistaxis       History of Present Illness:    Patient says she had sustained a fall she hit the top of her head but did not pass out this happened about 2 weeks back.  However 1 week back she developed nosebleed she has had excessive nose bleed almost on a daily basis sometimes is associated with headache but sometimes not she has checked her blood pressure and that usually comes out to be normal.  She is not on any blood thinners no history of headache she denies any weakness in any part of the body.  At the time and the nosebleed started few days orally to that she had started Vyvanse for treatment for ADD and she says Vyvanse is working really well.  He has not caused any other side effects with her that she has experience with other ADD medications.  It helps a concentrate and she seems to sleep okay also.    ROS:  Review of Systems   Constitutional: Negative for activity change, chills, fatigue, fever and unexpected weight change.   HENT: Positive for nosebleeds. Negative for congestion, ear discharge, ear pain, hearing loss, postnasal drip and rhinorrhea.    Eyes: Negative for pain and visual disturbance.   Respiratory: Negative for cough, chest tightness and shortness of breath.    Cardiovascular: Negative for chest pain and palpitations.   Gastrointestinal: Negative for abdominal pain, diarrhea and vomiting.   Endocrine: Negative for heat intolerance.   Genitourinary: Negative for dysuria, flank pain, frequency and hematuria.   Musculoskeletal: Negative for back pain, gait problem and neck pain.   Skin: Negative for color change and rash.   Neurological: Positive for headaches. Negative for dizziness, tremors, seizures and numbness.   Psychiatric/Behavioral: Negative for agitation, hallucinations, self-injury, sleep disturbance and suicidal ideas. The patient is not nervous/anxious.        Past Medical History:   Diagnosis Date    DDD  "(degenerative disc disease), lumbar     Fatty liver     Nicotine abuse        Social History:  Social History     Socioeconomic History    Marital status:      Spouse name: None    Number of children: None    Years of education: None    Highest education level: None   Social Needs    Financial resource strain: None    Food insecurity - worry: None    Food insecurity - inability: None    Transportation needs - medical: None    Transportation needs - non-medical: None   Occupational History     Employer: homemaker   Tobacco Use    Smoking status: Former Smoker     Types: Cigarettes     Last attempt to quit: 10/7/2015     Years since quittin.9    Smokeless tobacco: Never Used   Substance and Sexual Activity    Alcohol use: Yes     Alcohol/week: 0.0 oz     Comment: occasionally     Drug use: No    Sexual activity: Yes     Partners: Male     Birth control/protection: None   Other Topics Concern    None   Social History Narrative    None       Family History:   family history includes Breast cancer in her maternal grandmother and mother; Cancer in her maternal grandmother; Diabetes in her cousin; Heart attack in her maternal grandmother; Hypertension in her maternal grandmother.    Health Maintenance   Topic Date Due    Influenza Vaccine  2018    Mammogram  2018    Pap Smear with HPV Cotest  2018    TETANUS VACCINE  03/15/2023    Lipid Panel  Completed       Physical Exam:    Vital Signs  Temp: 98.4 °F (36.9 °C)  Temp src: Tympanic  Pulse: 82  SpO2: 99 %  BP: 108/68  BP Location: Left arm  Patient Position: Sitting  Pain Score: 0-No pain  Height and Weight  Height: 5' 9" (175.3 cm)  Weight: 76.2 kg (167 lb 15.9 oz)  BSA (Calculated - sq m): 1.93 sq meters  BMI (Calculated): 24.9  Weight in (lb) to have BMI = 25: 168.9]    Body mass index is 24.81 kg/m².    Physical Exam   Constitutional: She is oriented to person, place, and time. She appears well-developed.   HENT: "   Nose: No mucosal edema, rhinorrhea, nose lacerations, sinus tenderness, nasal deformity, septal deviation or nasal septal hematoma. Epistaxis is observed. Right sinus exhibits no maxillary sinus tenderness and no frontal sinus tenderness. Left sinus exhibits no maxillary sinus tenderness and no frontal sinus tenderness.   Mouth/Throat: Oropharynx is clear and moist.   Eyes: Conjunctivae are normal. Pupils are equal, round, and reactive to light.   Neck: Normal range of motion. Neck supple.   Cardiovascular: Normal rate, regular rhythm and normal heart sounds.   No murmur heard.  Pulmonary/Chest: Effort normal and breath sounds normal. No respiratory distress. She has no wheezes. She has no rales. She exhibits no tenderness.   Abdominal: Soft. She exhibits no distension and no mass. There is no tenderness. There is no guarding.   Musculoskeletal: She exhibits no edema or tenderness.   Lymphadenopathy:     She has no cervical adenopathy.   Neurological: She is alert and oriented to person, place, and time. She has normal reflexes.   Skin: Skin is warm and dry.   Psychiatric: She has a normal mood and affect. Her behavior is normal. Judgment and thought content normal.         Assessment:      ICD-10-CM ICD-9-CM   1. Bleeding from the nose R04.0 784.7   2. Injury of head, initial encounter S09.90XA 959.01   3. Adult ADHD F90.9 314.01         Plan:    Because there is history of fall will get CT of the head.  Will get ENT evaluation because this appears to be posterior nosebleed.  Patient been advised to hold the Vyvanse for the next 2-4 days.      Orders Placed This Encounter   Procedures    CT Head Without Contrast    Ambulatory referral to ENT       Current Outpatient Medications   Medication Sig Dispense Refill    lisdexamfetamine (VYVANSE) 30 MG capsule Take 1 capsule (30 mg total) by mouth every morning. 30 capsule 0    cetirizine (ZYRTEC) 10 MG tablet Take 1 tablet (10 mg total) by mouth every evening. 30  tablet 5    cyclobenzaprine (FLEXERIL) 5 MG tablet Take 1 tablet (5 mg total) by mouth nightly as needed for Muscle spasms. 30 tablet 0    fluticasone (FLONASE) 50 mcg/actuation nasal spray 2 sprays by Each Nare route once daily. 16 g 11    NATURE-THROID 32.5 mg Tab Take 1 tablet by mouth once daily.  3    valACYclovir (VALTREX) 1000 MG tablet Take 1 tablet by mouth once daily.       No current facility-administered medications for this visit.        Medications Discontinued During This Encounter   Medication Reason    dextroamphetamine-amphetamine (ADDERALL XR) 25 MG 24 hr capsule Patient no longer taking    dextroamphetamine-amphetamine (ADDERALL XR) 25 MG 24 hr capsule Patient no longer taking    dextroamphetamine-amphetamine (ADDERALL XR) 25 MG 24 hr capsule Patient no longer taking       No Follow-up on file.      Jennifer Summers MD

## 2018-09-12 ENCOUNTER — OFFICE VISIT (OUTPATIENT)
Dept: OTOLARYNGOLOGY | Facility: CLINIC | Age: 43
End: 2018-09-12
Payer: COMMERCIAL

## 2018-09-12 ENCOUNTER — HOSPITAL ENCOUNTER (OUTPATIENT)
Dept: RADIOLOGY | Facility: HOSPITAL | Age: 43
Discharge: HOME | End: 2018-09-12
Attending: FAMILY MEDICINE
Payer: COMMERCIAL

## 2018-09-12 VITALS
WEIGHT: 168 LBS | BODY MASS INDEX: 24.88 KG/M2 | RESPIRATION RATE: 20 BRPM | HEART RATE: 83 BPM | HEIGHT: 69 IN | TEMPERATURE: 98 F | SYSTOLIC BLOOD PRESSURE: 116 MMHG | DIASTOLIC BLOOD PRESSURE: 76 MMHG

## 2018-09-12 DIAGNOSIS — S09.90XA INJURY OF HEAD, INITIAL ENCOUNTER: ICD-10-CM

## 2018-09-12 DIAGNOSIS — R04.0 EPISTAXIS: Primary | ICD-10-CM

## 2018-09-12 PROCEDURE — 99203 OFFICE O/P NEW LOW 30 MIN: CPT | Mod: S$GLB,,, | Performed by: PHYSICIAN ASSISTANT

## 2018-09-12 PROCEDURE — 3008F BODY MASS INDEX DOCD: CPT | Mod: CPTII,S$GLB,, | Performed by: PHYSICIAN ASSISTANT

## 2018-09-12 PROCEDURE — 70450 CT HEAD/BRAIN W/O DYE: CPT | Mod: TC

## 2018-09-12 PROCEDURE — 99999 PR PBB SHADOW E&M-EST. PATIENT-LVL III: CPT | Mod: PBBFAC,,, | Performed by: PHYSICIAN ASSISTANT

## 2018-09-12 NOTE — LETTER
September 12, 2018      Jennifer Summers MD  [address] [address] [address]           [facility]  [address] [place] [id number]  Phone: [phone]  Fax: [phone]          Patient: Venice Orosco   MR Number: [id number]   YOB: 1975   Date of Visit: 9/12/2018       Dear Dr. Jennifer Summers:    Thank you for referring Venice Orsoco to me for evaluation. Attached you will find relevant portions of my assessment and plan of care.    If you have questions, please do not hesitate to call me. I look forward to following Venice Orosco along with you.    Sincerely,    Shelia Quiroz PA-C    Enclosure  CC:  No Recipients    If you would like to receive this communication electronically, please contact [email or web address] or [phone] to request more information on [facility] access.    For providers and/or their staff who would like to refer a patient to [facility], please contact us through our one-stop-shop provider referral line, [facility] , at [phone].    If you feel you have received this communication in error or would no longer like to receive these types of communications, please e-mail [email or web address]

## 2018-09-12 NOTE — PROGRESS NOTES
Subjective:       Patient ID: Venice Orosco is a 43 y.o. female.    Chief Complaint: nosebleeds     is a very pleasant 44 yo female here to see me today for the first time with complaint so recurrent nosebleeds that started about 2 weeks ago after sustaining a fall.  Her first nosebleed was a week after fall. She states that she hit the top of her head but did not pass out. Her last episode was 2 days ago. She has no prior history of nosebleeds and is not on blood thinners. She states her BP has been stable.  She does state that she has been taking more advil than usual.       Review of Systems   Constitutional: Negative for chills, fatigue, fever and unexpected weight change.   HENT: Positive for nosebleeds. Negative for congestion, dental problem, ear discharge, ear pain, facial swelling, hearing loss, postnasal drip, rhinorrhea, sinus pressure, sneezing, sore throat, tinnitus, trouble swallowing and voice change.    Eyes: Negative for redness, itching and visual disturbance.   Respiratory: Negative for cough, choking, shortness of breath and wheezing.    Cardiovascular: Negative for chest pain and palpitations.   Gastrointestinal: Negative for abdominal pain.        No reflux.   Musculoskeletal: Negative for gait problem.   Skin: Negative for rash.   Neurological: Positive for headaches. Negative for dizziness and light-headedness.   Psychiatric/Behavioral: Negative for decreased concentration.       Objective:      Physical Exam   Constitutional: She is oriented to person, place, and time. She appears well-developed and well-nourished. No distress.   HENT:   Head: Normocephalic and atraumatic.   Right Ear: Tympanic membrane, external ear and ear canal normal.   Left Ear: Tympanic membrane, external ear and ear canal normal.   Nose: Nose normal. No mucosal edema, rhinorrhea, nose lacerations, sinus tenderness, nasal deformity or septal deviation. No epistaxis. Right sinus exhibits no maxillary sinus  tenderness and no frontal sinus tenderness. Left sinus exhibits no maxillary sinus tenderness and no frontal sinus tenderness.   Mouth/Throat: Uvula is midline, oropharynx is clear and moist and mucous membranes are normal. Mucous membranes are not pale and not dry. No dental caries. No oropharyngeal exudate or posterior oropharyngeal erythema.   Mild dry blood in left nostril, no active bleeding. Evaluated with rigid scope and no source of bleeding seen. Mild swelling to posterior right nare   Eyes: Conjunctivae, EOM and lids are normal. Pupils are equal, round, and reactive to light. Right eye exhibits no chemosis. Left eye exhibits no chemosis. Right conjunctiva is not injected. Left conjunctiva is not injected. No scleral icterus. Right eye exhibits normal extraocular motion and no nystagmus. Left eye exhibits normal extraocular motion and no nystagmus.   Neck: Trachea normal and phonation normal. No tracheal tenderness present. No tracheal deviation present. No thyroid mass and no thyromegaly present.   Cardiovascular: Intact distal pulses.   Pulmonary/Chest: Effort normal. No stridor. No respiratory distress.   Abdominal: She exhibits no distension.   Lymphadenopathy:        Head (right side): No submental, no submandibular, no preauricular, no posterior auricular and no occipital adenopathy present.        Head (left side): No submental, no submandibular, no preauricular, no posterior auricular and no occipital adenopathy present.     She has no cervical adenopathy.   Neurological: She is alert and oriented to person, place, and time. No cranial nerve deficit.   Skin: Skin is warm and dry. No rash noted. No erythema.   Psychiatric: She has a normal mood and affect. Her behavior is normal.       Assessment:       1. Epistaxis        Plan:   Nose Bleed Instructions:  We had a long discussion regarding the importance of nasal moisture, and the use of a nasal saline spray or gel into nose four times daily to keep  moist.    Bactroban ointment in nostril twice daily if ordered.  Do not sleep or sit for long periods of time under a ceiling fan or other source of aggressive airflow.  Use a humidifier in bedroom or any room in your home you spend long periods of time.  Engage in only light activity. No strenuous activity. No heavy lifting or straining.   Use a stool softener to avoid straining.   No bending over at the hips. Keep nose above your heart at all times.  Sneeze with an open mouth to reduce pressure from nose.   Avoid foods or drinks hot in temperature for at least 48 hours then progress slowly.  Avoid hot steamy showers or baths for one week.

## 2018-10-08 ENCOUNTER — PATIENT MESSAGE (OUTPATIENT)
Dept: FAMILY MEDICINE | Facility: CLINIC | Age: 43
End: 2018-10-08

## 2018-10-08 RX ORDER — LISDEXAMFETAMINE DIMESYLATE 30 MG/1
30 CAPSULE ORAL EVERY MORNING
Qty: 30 CAPSULE | Refills: 0 | Status: SHIPPED | OUTPATIENT
Start: 2018-10-08 | End: 2018-11-07 | Stop reason: SDUPTHER

## 2018-10-11 ENCOUNTER — HOSPITAL ENCOUNTER (OUTPATIENT)
Dept: RADIOLOGY | Facility: HOSPITAL | Age: 43
Discharge: HOME | End: 2018-10-11
Attending: FAMILY MEDICINE
Payer: COMMERCIAL

## 2018-10-11 DIAGNOSIS — Z12.31 ENCOUNTER FOR SCREENING MAMMOGRAM FOR BREAST CANCER: ICD-10-CM

## 2018-10-11 PROCEDURE — 77067 SCR MAMMO BI INCL CAD: CPT | Mod: 26,,, | Performed by: RADIOLOGY

## 2018-10-11 PROCEDURE — 77063 BREAST TOMOSYNTHESIS BI: CPT | Mod: 26,,, | Performed by: RADIOLOGY

## 2018-10-11 PROCEDURE — 77067 SCR MAMMO BI INCL CAD: CPT | Mod: TC

## 2018-10-11 PROCEDURE — 77063 BREAST TOMOSYNTHESIS BI: CPT | Mod: TC

## 2018-11-07 ENCOUNTER — PATIENT MESSAGE (OUTPATIENT)
Dept: FAMILY MEDICINE | Facility: CLINIC | Age: 43
End: 2018-11-07

## 2018-11-07 RX ORDER — LISDEXAMFETAMINE DIMESYLATE 30 MG/1
30 CAPSULE ORAL EVERY MORNING
Qty: 30 CAPSULE | Refills: 0 | Status: SHIPPED | OUTPATIENT
Start: 2018-12-08 | End: 2018-12-18 | Stop reason: SDUPTHER

## 2018-11-07 RX ORDER — LISDEXAMFETAMINE DIMESYLATE 30 MG/1
30 CAPSULE ORAL EVERY MORNING
Qty: 30 CAPSULE | Refills: 0 | Status: SHIPPED | OUTPATIENT
Start: 2018-11-08 | End: 2018-12-28

## 2018-12-18 ENCOUNTER — PATIENT MESSAGE (OUTPATIENT)
Dept: FAMILY MEDICINE | Facility: CLINIC | Age: 43
End: 2018-12-18

## 2018-12-18 ENCOUNTER — OFFICE VISIT (OUTPATIENT)
Dept: FAMILY MEDICINE | Facility: CLINIC | Age: 43
End: 2018-12-18
Payer: COMMERCIAL

## 2018-12-18 VITALS
TEMPERATURE: 99 F | HEART RATE: 73 BPM | OXYGEN SATURATION: 98 % | HEIGHT: 69 IN | SYSTOLIC BLOOD PRESSURE: 112 MMHG | WEIGHT: 174.69 LBS | BODY MASS INDEX: 25.87 KG/M2 | DIASTOLIC BLOOD PRESSURE: 68 MMHG

## 2018-12-18 DIAGNOSIS — J02.9 SORE THROAT: Primary | ICD-10-CM

## 2018-12-18 DIAGNOSIS — F90.9 ADULT ADHD: ICD-10-CM

## 2018-12-18 LAB
CTP QC/QA: YES
S PYO RRNA THROAT QL PROBE: NEGATIVE

## 2018-12-18 PROCEDURE — 99999 PR PBB SHADOW E&M-EST. PATIENT-LVL III: CPT | Mod: PBBFAC,,, | Performed by: FAMILY MEDICINE

## 2018-12-18 PROCEDURE — 99214 OFFICE O/P EST MOD 30 MIN: CPT | Mod: 25,S$GLB,, | Performed by: FAMILY MEDICINE

## 2018-12-18 PROCEDURE — 3008F BODY MASS INDEX DOCD: CPT | Mod: CPTII,S$GLB,, | Performed by: FAMILY MEDICINE

## 2018-12-18 PROCEDURE — 87070 CULTURE OTHR SPECIMN AEROBIC: CPT

## 2018-12-18 PROCEDURE — 96372 THER/PROPH/DIAG INJ SC/IM: CPT | Mod: S$GLB,,, | Performed by: FAMILY MEDICINE

## 2018-12-18 PROCEDURE — 87880 STREP A ASSAY W/OPTIC: CPT | Mod: QW,S$GLB,, | Performed by: FAMILY MEDICINE

## 2018-12-18 RX ORDER — METHYLPREDNISOLONE ACETATE 80 MG/ML
80 INJECTION, SUSPENSION INTRA-ARTICULAR; INTRALESIONAL; INTRAMUSCULAR; SOFT TISSUE ONCE
Status: COMPLETED | OUTPATIENT
Start: 2018-12-18 | End: 2018-12-18

## 2018-12-18 RX ADMIN — METHYLPREDNISOLONE ACETATE 80 MG: 80 INJECTION, SUSPENSION INTRA-ARTICULAR; INTRALESIONAL; INTRAMUSCULAR; SOFT TISSUE at 02:12

## 2018-12-18 NOTE — PROGRESS NOTES
Depo medrol 80 mg given IM  right    Ventrogluteal, patient tolerated well, 15 minute wait recommended to watch for adverse reactions, patient left the clinic in stable condition

## 2018-12-18 NOTE — PROGRESS NOTES
Chief Complaint:    Chief Complaint   Patient presents with    Sore Throat       History of Present Illness:    Patient presents today with 1 day history of sore throat denies any fever cough congestion or in the symptoms.  Her son was diagnosed with strep so she wants to get checked out.    She is also having trouble with Vyvanse she says it helps her concentrate really well but it wears off by noon.  She has no other side effects.  She was wondering if she can try 40 mg her son is taking 40 mg.    ROS:  Review of Systems   Constitutional: Negative for activity change, chills, fatigue, fever and unexpected weight change.   HENT: Positive for sore throat. Negative for congestion, ear discharge, ear pain, hearing loss, postnasal drip and rhinorrhea.    Eyes: Negative for pain and visual disturbance.   Respiratory: Negative for cough, chest tightness and shortness of breath.    Cardiovascular: Negative for chest pain and palpitations.   Gastrointestinal: Negative for abdominal pain, diarrhea and vomiting.   Endocrine: Negative for heat intolerance.   Genitourinary: Negative for dysuria, flank pain, frequency and hematuria.   Musculoskeletal: Negative for back pain, gait problem and neck pain.   Skin: Negative for color change and rash.   Neurological: Negative for dizziness, tremors, seizures, numbness and headaches.   Psychiatric/Behavioral: Negative for agitation, hallucinations, self-injury, sleep disturbance and suicidal ideas. The patient is not nervous/anxious.        Past Medical History:   Diagnosis Date    DDD (degenerative disc disease), lumbar     Fatty liver     Nicotine abuse        Social History:  Social History     Socioeconomic History    Marital status:      Spouse name: None    Number of children: None    Years of education: None    Highest education level: None   Social Needs    Financial resource strain: None    Food insecurity - worry: None    Food insecurity - inability: None  "   Transportation needs - medical: None    Transportation needs - non-medical: None   Occupational History     Employer: homemaker   Tobacco Use    Smoking status: Former Smoker     Types: Cigarettes     Last attempt to quit: 10/7/2015     Years since quitting: 3.2    Smokeless tobacco: Never Used   Substance and Sexual Activity    Alcohol use: Yes     Alcohol/week: 0.0 oz     Comment: occasionally     Drug use: No    Sexual activity: Yes     Partners: Male     Birth control/protection: None   Other Topics Concern    None   Social History Narrative    None       Family History:   family history includes Breast cancer in her maternal grandmother and mother; Cancer in her maternal grandmother; Diabetes in her cousin; Heart attack in her maternal grandmother; Hypertension in her maternal grandmother.    Health Maintenance   Topic Date Due    Influenza Vaccine  08/01/2018    Pap Smear with HPV Cotest  11/11/2018    Mammogram  10/11/2019    TETANUS VACCINE  03/15/2023    Lipid Panel  Completed       Physical Exam:    Vital Signs  Temp: 98.8 °F (37.1 °C)  Pulse: 73  SpO2: 98 %  BP: 112/68  BP Location: Left arm  Patient Position: Sitting  Height and Weight  Height: 5' 9" (175.3 cm)  Weight: 79.2 kg (174 lb 11.4 oz)  BSA (Calculated - sq m): 1.96 sq meters  BMI (Calculated): 25.9  Weight in (lb) to have BMI = 25: 168.9]    Body mass index is 25.8 kg/m².    Physical Exam   Constitutional: She appears well-developed and well-nourished.   HENT:   Head: Normocephalic and atraumatic.   Right Ear: Tympanic membrane is not bulging.   Left Ear: Tympanic membrane is not bulging.   Nose: No rhinorrhea. Right sinus exhibits no maxillary sinus tenderness and no frontal sinus tenderness. Left sinus exhibits no maxillary sinus tenderness and no frontal sinus tenderness.   Mouth/Throat: Mucous membranes are normal. Posterior oropharyngeal erythema present. No tonsillar abscesses.   Eyes: Conjunctivae are normal. Pupils are " equal, round, and reactive to light.   Neck: Normal range of motion.   Cardiovascular: Normal rate and normal heart sounds.   Pulmonary/Chest: Effort normal and breath sounds normal. No stridor. No respiratory distress. She has no wheezes. She has no rales. She exhibits no tenderness.   Abdominal: Soft. There is no tenderness.   Lymphadenopathy:     She has no cervical adenopathy.         Assessment:      ICD-10-CM ICD-9-CM   1. Sore throat J02.9 462   2. Adult ADHD F90.9 314.01         Plan:    Rapid strep is negative patient given Depo-Medrol injection  Recommend saline gargles.    She can try 40 mg of Vyvanse and let her know if this works for her.      Orders Placed This Encounter   Procedures    POCT Rapid Strep A       Current Outpatient Medications   Medication Sig Dispense Refill    cetirizine (ZYRTEC) 10 MG tablet Take 1 tablet (10 mg total) by mouth every evening. 30 tablet 5    cyclobenzaprine (FLEXERIL) 5 MG tablet Take 1 tablet (5 mg total) by mouth nightly as needed for Muscle spasms. 30 tablet 0    fluticasone (FLONASE) 50 mcg/actuation nasal spray 2 sprays by Each Nare route once daily. 16 g 11    lisdexamfetamine (VYVANSE) 30 MG capsule Take 1 capsule (30 mg total) by mouth every morning. 30 capsule 0    NATURE-THROID 32.5 mg Tab Take 1 tablet by mouth once daily.  3    valACYclovir (VALTREX) 1000 MG tablet Take 1 tablet by mouth once daily.       No current facility-administered medications for this visit.        Medications Discontinued During This Encounter   Medication Reason    lisdexamfetamine (VYVANSE) 30 MG capsule Duplicate Order       No Follow-up on file.      Jennifer Summers MD

## 2018-12-22 LAB — BACTERIA SPEC AEROBE CULT: NORMAL

## 2018-12-28 ENCOUNTER — LAB VISIT (OUTPATIENT)
Dept: LAB | Facility: HOSPITAL | Age: 43
End: 2018-12-28
Attending: FAMILY MEDICINE
Payer: COMMERCIAL

## 2018-12-28 ENCOUNTER — OFFICE VISIT (OUTPATIENT)
Dept: FAMILY MEDICINE | Facility: CLINIC | Age: 43
End: 2018-12-28
Payer: COMMERCIAL

## 2018-12-28 VITALS
BODY MASS INDEX: 25.44 KG/M2 | SYSTOLIC BLOOD PRESSURE: 122 MMHG | TEMPERATURE: 98 F | WEIGHT: 172.31 LBS | OXYGEN SATURATION: 99 % | DIASTOLIC BLOOD PRESSURE: 67 MMHG | HEART RATE: 86 BPM

## 2018-12-28 DIAGNOSIS — Z00.00 WELL ADULT EXAM: Primary | ICD-10-CM

## 2018-12-28 DIAGNOSIS — J30.9 ALLERGIC RHINITIS, UNSPECIFIED SEASONALITY, UNSPECIFIED TRIGGER: ICD-10-CM

## 2018-12-28 DIAGNOSIS — F90.9 ADULT ADHD: ICD-10-CM

## 2018-12-28 LAB
ALBUMIN SERPL BCP-MCNC: 4.1 G/DL
ALP SERPL-CCNC: 69 U/L
ALT SERPL W/O P-5'-P-CCNC: 14 U/L
ANION GAP SERPL CALC-SCNC: 7 MMOL/L
AST SERPL-CCNC: 17 U/L
BASOPHILS # BLD AUTO: 0.03 K/UL
BASOPHILS NFR BLD: 0.4 %
BILIRUB SERPL-MCNC: 0.6 MG/DL
BUN SERPL-MCNC: 13 MG/DL
CALCIUM SERPL-MCNC: 9.5 MG/DL
CHLORIDE SERPL-SCNC: 102 MMOL/L
CO2 SERPL-SCNC: 30 MMOL/L
CREAT SERPL-MCNC: 0.7 MG/DL
DIFFERENTIAL METHOD: ABNORMAL
EOSINOPHIL # BLD AUTO: 0.1 K/UL
EOSINOPHIL NFR BLD: 0.6 %
ERYTHROCYTE [DISTWIDTH] IN BLOOD BY AUTOMATED COUNT: 12 %
EST. GFR  (AFRICAN AMERICAN): >60 ML/MIN/1.73 M^2
EST. GFR  (NON AFRICAN AMERICAN): >60 ML/MIN/1.73 M^2
GLUCOSE SERPL-MCNC: 79 MG/DL
HCT VFR BLD AUTO: 42.6 %
HGB BLD-MCNC: 14.1 G/DL
IMM GRANULOCYTES # BLD AUTO: 0.03 K/UL
IMM GRANULOCYTES NFR BLD AUTO: 0.4 %
LYMPHOCYTES # BLD AUTO: 2 K/UL
LYMPHOCYTES NFR BLD: 25.1 %
MCH RBC QN AUTO: 31.3 PG
MCHC RBC AUTO-ENTMCNC: 33.1 G/DL
MCV RBC AUTO: 95 FL
MONOCYTES # BLD AUTO: 0.6 K/UL
MONOCYTES NFR BLD: 6.9 %
NEUTROPHILS # BLD AUTO: 5.3 K/UL
NEUTROPHILS NFR BLD: 66.6 %
NRBC BLD-RTO: 0 /100 WBC
PLATELET # BLD AUTO: 262 K/UL
PMV BLD AUTO: 10.3 FL
POTASSIUM SERPL-SCNC: 4.3 MMOL/L
PROT SERPL-MCNC: 7.3 G/DL
RBC # BLD AUTO: 4.5 M/UL
SODIUM SERPL-SCNC: 139 MMOL/L
WBC # BLD AUTO: 7.93 K/UL

## 2018-12-28 PROCEDURE — 80053 COMPREHEN METABOLIC PANEL: CPT

## 2018-12-28 PROCEDURE — 36415 COLL VENOUS BLD VENIPUNCTURE: CPT | Mod: PO

## 2018-12-28 PROCEDURE — 99999 PR PBB SHADOW E&M-EST. PATIENT-LVL III: CPT | Mod: PBBFAC,,, | Performed by: FAMILY MEDICINE

## 2018-12-28 PROCEDURE — 99396 PREV VISIT EST AGE 40-64: CPT | Mod: S$GLB,,, | Performed by: FAMILY MEDICINE

## 2018-12-28 PROCEDURE — 85025 COMPLETE CBC W/AUTO DIFF WBC: CPT

## 2018-12-28 RX ORDER — FLUTICASONE PROPIONATE 50 MCG
2 SPRAY, SUSPENSION (ML) NASAL DAILY
Qty: 16 G | Refills: 11 | Status: SHIPPED | OUTPATIENT
Start: 2018-12-28 | End: 2020-06-25

## 2018-12-28 RX ORDER — CYCLOBENZAPRINE HCL 10 MG
10 TABLET ORAL 3 TIMES DAILY
Qty: 60 TABLET | Refills: 0 | Status: SHIPPED | OUTPATIENT
Start: 2018-12-28 | End: 2020-05-05

## 2018-12-28 RX ORDER — LISDEXAMFETAMINE DIMESYLATE 40 MG/1
40 CAPSULE ORAL EVERY MORNING
Qty: 30 CAPSULE | Refills: 0 | Status: SHIPPED | OUTPATIENT
Start: 2019-02-28 | End: 2019-05-01 | Stop reason: SDUPTHER

## 2018-12-28 RX ORDER — NITROFURANTOIN 25; 75 MG/1; MG/1
CAPSULE ORAL
Refills: 0 | COMMUNITY
Start: 2018-10-22 | End: 2018-12-28 | Stop reason: ALTCHOICE

## 2018-12-28 RX ORDER — LISDEXAMFETAMINE DIMESYLATE 40 MG/1
40 CAPSULE ORAL EVERY MORNING
Qty: 30 CAPSULE | Refills: 0 | Status: SHIPPED | OUTPATIENT
Start: 2019-01-28 | End: 2019-05-01 | Stop reason: SDUPTHER

## 2018-12-28 RX ORDER — LISDEXAMFETAMINE DIMESYLATE 40 MG/1
40 CAPSULE ORAL EVERY MORNING
Qty: 30 CAPSULE | Refills: 0 | Status: SHIPPED | OUTPATIENT
Start: 2018-12-28 | End: 2019-05-01 | Stop reason: SDUPTHER

## 2018-12-28 NOTE — PROGRESS NOTES
Chief Complaint:    Chief Complaint   Patient presents with    Annual Exam       History of Present Illness:    Presents today for a physical doing okay she wants to take Vyvanse 40 mg.  She uses Flonase as needed is requesting a refill.  Next also a times requesting a refill  She is up-to-date on Pap smear and mammogram.    ROS:  Review of Systems   Constitutional: Negative for activity change, chills, fatigue, fever and unexpected weight change.   HENT: Negative for congestion, ear discharge, ear pain, hearing loss, postnasal drip and rhinorrhea.    Eyes: Negative for pain and visual disturbance.   Respiratory: Negative for cough, chest tightness and shortness of breath.    Cardiovascular: Negative for chest pain and palpitations.   Gastrointestinal: Negative for abdominal pain, diarrhea and vomiting.   Endocrine: Negative for heat intolerance.   Genitourinary: Negative for dysuria, flank pain, frequency and hematuria.   Musculoskeletal: Negative for back pain, gait problem and neck pain.   Skin: Negative for color change and rash.   Neurological: Negative for dizziness, tremors, seizures, numbness and headaches.   Psychiatric/Behavioral: Negative for agitation, hallucinations, self-injury, sleep disturbance and suicidal ideas. The patient is not nervous/anxious.        Past Medical History:   Diagnosis Date    DDD (degenerative disc disease), lumbar     Fatty liver     Nicotine abuse        Social History:  Social History     Socioeconomic History    Marital status:      Spouse name: None    Number of children: None    Years of education: None    Highest education level: None   Social Needs    Financial resource strain: None    Food insecurity - worry: None    Food insecurity - inability: None    Transportation needs - medical: None    Transportation needs - non-medical: None   Occupational History     Employer: homemaker   Tobacco Use    Smoking status: Former Smoker     Types: Cigarettes      Last attempt to quit: 10/7/2015     Years since quitting: 3.2    Smokeless tobacco: Never Used   Substance and Sexual Activity    Alcohol use: Yes     Alcohol/week: 0.0 oz     Comment: occasionally     Drug use: No    Sexual activity: Yes     Partners: Male     Birth control/protection: None   Other Topics Concern    None   Social History Narrative    None       Family History:   family history includes Breast cancer in her maternal grandmother and mother; Cancer in her maternal grandmother; Diabetes in her cousin; Heart attack in her maternal grandmother; Hypertension in her maternal grandmother.    Health Maintenance   Topic Date Due    Influenza Vaccine  08/01/2018    Pap Smear with HPV Cotest  11/11/2018    Mammogram  10/11/2019    TETANUS VACCINE  03/15/2023    Lipid Panel  Completed       Physical Exam:    Vital Signs  Temp: 98.1 °F (36.7 °C)  Temp src: Tympanic  Pulse: 86  SpO2: 99 %  BP: 122/67  BP Location: Left arm  Patient Position: Sitting  Pain Score: 0-No pain  Height and Weight  Weight: 78.1 kg (172 lb 4.6 oz)]    Body mass index is 25.44 kg/m².    Physical Exam   Constitutional: She is oriented to person, place, and time. She appears well-developed.   HENT:   Mouth/Throat: Oropharynx is clear and moist.   Eyes: Conjunctivae are normal. Pupils are equal, round, and reactive to light.   Neck: Normal range of motion. Neck supple.   Cardiovascular: Normal rate, regular rhythm and normal heart sounds.   No murmur heard.  Pulmonary/Chest: Effort normal and breath sounds normal. No respiratory distress. She has no wheezes. She has no rales. She exhibits no tenderness.   Abdominal: Soft. She exhibits no distension and no mass. There is no tenderness. There is no guarding.   Musculoskeletal: She exhibits no edema or tenderness.   Lymphadenopathy:     She has no cervical adenopathy.   Neurological: She is alert and oriented to person, place, and time. She has normal reflexes.   Skin: Skin is  warm and dry.   Psychiatric: She has a normal mood and affect. Her behavior is normal. Judgment and thought content normal.         Assessment:      ICD-10-CM ICD-9-CM   1. Well adult exam Z00.00 V70.0   2. Adult ADHD F90.9 314.01   3. Allergic rhinitis, unspecified seasonality, unspecified trigger J30.9 477.9         Plan:    Continue current plan,  looked up 3 prescriptions for Vyvanse sent to the pharmacy  Check labs as below  Follow-up 3 months      Orders Placed This Encounter   Procedures    CBC auto differential    Comprehensive metabolic panel       Current Outpatient Medications   Medication Sig Dispense Refill    cetirizine (ZYRTEC) 10 MG tablet Take 1 tablet (10 mg total) by mouth every evening. 30 tablet 5    cyclobenzaprine (FLEXERIL) 10 MG tablet Take 1 tablet (10 mg total) by mouth 3 (three) times daily. 60 tablet 0    fluticasone (FLONASE) 50 mcg/actuation nasal spray 2 sprays (100 mcg total) by Each Nare route once daily. 16 g 11    lisdexamfetamine (VYVANSE) 40 MG Cap Take 1 capsule (40 mg total) by mouth every morning. 30 capsule 0    [START ON 1/28/2019] lisdexamfetamine (VYVANSE) 40 MG Cap Take 1 capsule (40 mg total) by mouth every morning. 30 capsule 0    [START ON 2/28/2019] lisdexamfetamine (VYVANSE) 40 MG Cap Take 1 capsule (40 mg total) by mouth every morning. 30 capsule 0    NATURE-THROID 32.5 mg Tab Take 1 tablet by mouth once daily.  3    valACYclovir (VALTREX) 1000 MG tablet Take 1 tablet by mouth once daily.       No current facility-administered medications for this visit.        Medications Discontinued During This Encounter   Medication Reason    nitrofurantoin, macrocrystal-monohydrate, (MACROBID) 100 MG capsule Therapy completed    lisdexamfetamine (VYVANSE) 30 MG capsule     fluticasone (FLONASE) 50 mcg/actuation nasal spray     cyclobenzaprine (FLEXERIL) 5 MG tablet        No Follow-up on file.      Jennifer Summers MD

## 2019-02-13 ENCOUNTER — PATIENT MESSAGE (OUTPATIENT)
Dept: FAMILY MEDICINE | Facility: CLINIC | Age: 44
End: 2019-02-13

## 2019-05-01 ENCOUNTER — OFFICE VISIT (OUTPATIENT)
Dept: FAMILY MEDICINE | Facility: CLINIC | Age: 44
End: 2019-05-01
Payer: COMMERCIAL

## 2019-05-01 VITALS
HEART RATE: 91 BPM | HEIGHT: 69 IN | WEIGHT: 183.88 LBS | BODY MASS INDEX: 27.24 KG/M2 | TEMPERATURE: 98 F | SYSTOLIC BLOOD PRESSURE: 112 MMHG | OXYGEN SATURATION: 99 % | DIASTOLIC BLOOD PRESSURE: 68 MMHG

## 2019-05-01 DIAGNOSIS — Z98.84 HX OF LAPAROSCOPIC ADJUSTABLE GASTRIC BANDING: ICD-10-CM

## 2019-05-01 DIAGNOSIS — K59.00 CONSTIPATION, UNSPECIFIED CONSTIPATION TYPE: ICD-10-CM

## 2019-05-01 DIAGNOSIS — F90.9 ADULT ADHD: Primary | ICD-10-CM

## 2019-05-01 DIAGNOSIS — M54.50 CHRONIC BILATERAL LOW BACK PAIN WITHOUT SCIATICA: ICD-10-CM

## 2019-05-01 DIAGNOSIS — G89.29 CHRONIC BILATERAL LOW BACK PAIN WITHOUT SCIATICA: ICD-10-CM

## 2019-05-01 PROCEDURE — 99214 PR OFFICE/OUTPT VISIT, EST, LEVL IV, 30-39 MIN: ICD-10-PCS | Mod: S$GLB,,, | Performed by: FAMILY MEDICINE

## 2019-05-01 PROCEDURE — 99999 PR PBB SHADOW E&M-EST. PATIENT-LVL III: ICD-10-PCS | Mod: PBBFAC,,, | Performed by: FAMILY MEDICINE

## 2019-05-01 PROCEDURE — 3008F BODY MASS INDEX DOCD: CPT | Mod: CPTII,S$GLB,, | Performed by: FAMILY MEDICINE

## 2019-05-01 PROCEDURE — 99999 PR PBB SHADOW E&M-EST. PATIENT-LVL III: CPT | Mod: PBBFAC,,, | Performed by: FAMILY MEDICINE

## 2019-05-01 PROCEDURE — 3008F PR BODY MASS INDEX (BMI) DOCUMENTED: ICD-10-PCS | Mod: CPTII,S$GLB,, | Performed by: FAMILY MEDICINE

## 2019-05-01 PROCEDURE — 99214 OFFICE O/P EST MOD 30 MIN: CPT | Mod: S$GLB,,, | Performed by: FAMILY MEDICINE

## 2019-05-01 RX ORDER — SYRING-NEEDL,DISP,INSUL,0.3 ML 29 G X1/2"
148 SYRINGE, EMPTY DISPOSABLE MISCELLANEOUS ONCE
Qty: 1 BOTTLE | Refills: 0 | Status: SHIPPED | OUTPATIENT
Start: 2019-05-01 | End: 2019-05-01

## 2019-05-01 RX ORDER — LISDEXAMFETAMINE DIMESYLATE 40 MG/1
40 CAPSULE ORAL EVERY MORNING
Qty: 30 CAPSULE | Refills: 0 | Status: SHIPPED | OUTPATIENT
Start: 2019-05-01 | End: 2019-08-07 | Stop reason: SDUPTHER

## 2019-05-01 RX ORDER — LISDEXAMFETAMINE DIMESYLATE 40 MG/1
40 CAPSULE ORAL EVERY MORNING
Qty: 30 CAPSULE | Refills: 0 | Status: SHIPPED | OUTPATIENT
Start: 2019-07-01 | End: 2019-08-07 | Stop reason: SDUPTHER

## 2019-05-01 RX ORDER — LISDEXAMFETAMINE DIMESYLATE 40 MG/1
40 CAPSULE ORAL EVERY MORNING
Qty: 30 CAPSULE | Refills: 0 | Status: SHIPPED | OUTPATIENT
Start: 2019-06-01 | End: 2019-08-07 | Stop reason: SDUPTHER

## 2019-05-01 NOTE — PROGRESS NOTES
Chief Complaint:    Chief Complaint   Patient presents with    Medication Refill       History of Present Illness:    She is here for refill of Vyvanse working well no side effects  Plan she suffers with chronic back pain it flares up from time to time she has had MRI that showed desiccation of the disc at L5-S1 it does not radiate to her legs no tingling numbness weakness but sometimes it flares up really bad she has tried physical therapy has not helped she is not too interested in [facility].  Also she suffered with constipation since she had a lap band surgery back in 2006 over-the-counter meds have not worked well for her.  She says she has had a thyroid levels also checked.    ROS:  Review of Systems   Constitutional: Negative for activity change, chills, fatigue, fever and unexpected weight change.   HENT: Negative for congestion, ear discharge, ear pain, hearing loss, postnasal drip and rhinorrhea.    Eyes: Negative for pain and visual disturbance.   Respiratory: Negative for cough, chest tightness and shortness of breath.    Cardiovascular: Negative for chest pain and palpitations.   Gastrointestinal: Positive for constipation. Negative for abdominal pain, diarrhea and vomiting.   Endocrine: Negative for heat intolerance.   Genitourinary: Negative for dysuria, flank pain, frequency and hematuria.   Musculoskeletal: Positive for back pain. Negative for gait problem and neck pain.   Skin: Negative for color change and rash.   Neurological: Negative for dizziness, tremors, seizures, numbness and headaches.   Psychiatric/Behavioral: Negative for agitation, hallucinations, self-injury, sleep disturbance and suicidal ideas. The patient is not nervous/anxious.        Past Medical History:   Diagnosis Date    DDD (degenerative disc disease), lumbar     Fatty liver     Nicotine abuse        Social History:  Social History     Socioeconomic History    Marital status:      Spouse name: Not on file    Number of  children: Not on file    Years of education: Not on file    Highest education level: Not on file   Occupational History     Employer: homemaker   Social Needs    Financial resource strain: Not very hard    Food insecurity:     Worry: Never true     Inability: Never true    Transportation needs:     Medical: No     Non-medical: No   Tobacco Use    Smoking status: Former Smoker     Types: Cigarettes     Last attempt to quit: 10/7/2015     Years since quitting: 3.5    Smokeless tobacco: Never Used   Substance and Sexual Activity    Alcohol use: Yes     Alcohol/week: 0.0 oz     Frequency: 2-3 times a week     Drinks per session: 1 or 2     Binge frequency: Less than monthly     Comment: occasionally     Drug use: No    Sexual activity: Yes     Partners: Male     Birth control/protection: None   Lifestyle    Physical activity:     Days per week: Not on file     Minutes per session: Not on file    Stress: Not on file   Relationships    Social connections:     Talks on phone: Not on file     Gets together: Not on file     Attends [employer] service: Not on file     Active member of club or organization: Not on file     Attends meetings of clubs or organizations: Not on file     Relationship status: Not on file   Other Topics Concern    Not on file   Social History Narrative    Not on file       Family History:   family history includes Breast cancer in her maternal grandmother and mother; Cancer in her maternal grandmother; Diabetes in her cousin; Heart attack in her maternal grandmother; Hypertension in her maternal grandmother.    Health Maintenance   Topic Date Due    Pap Smear with HPV Cotest  11/11/2018    Influenza Vaccine  08/01/2019    Mammogram  10/11/2019    TETANUS VACCINE  03/15/2023    Lipid Panel  Completed       Physical Exam:    Vital Signs  Temp: 97.8 °F (36.6 °C)  Temp src: Tympanic  Pulse: 91  SpO2: 99 %  BP: 112/68  BP Location: Left arm  Patient Position: Sitting  Pain Score: 0-No  "pain  Height and Weight  Height: 5' 9" (175.3 cm)  Weight: 83.4 kg (183 lb 13.8 oz)  BSA (Calculated - sq m): 2.01 sq meters  BMI (Calculated): 27.2  Weight in (lb) to have BMI = 25: 168.9]    Body mass index is 27.15 kg/m².    Physical Exam   Constitutional: She is oriented to person, place, and time. She appears well-developed.   HENT:   Mouth/Throat: Oropharynx is clear and moist.   Eyes: Pupils are equal, round, and reactive to light. Conjunctivae are normal.   Neck: Normal range of motion. Neck supple.   Cardiovascular: Normal rate, regular rhythm and normal heart sounds.   No murmur heard.  Pulmonary/Chest: Effort normal and breath sounds normal. No respiratory distress. She has no wheezes. She has no rales. She exhibits no tenderness.   Abdominal: Soft. She exhibits no distension and no mass. There is no guarding.   Musculoskeletal: She exhibits no edema.        Lumbar back: She exhibits tenderness.   Straight leg raising test is negative   Lymphadenopathy:     She has no cervical adenopathy.   Neurological: She is alert and oriented to person, place, and time. She has normal reflexes.   Skin: Skin is warm and dry.   Psychiatric: She has a normal mood and affect. Her behavior is normal. Judgment and thought content normal.         Assessment:      ICD-10-CM ICD-9-CM   1. Adult ADHD F90.9 314.01   2. Chronic bilateral low back pain without sciatica M54.5 724.2    G89.29 338.29   3. Constipation, unspecified constipation type K59.00 564.00   4. Hx of laparoscopic adjustable gastric banding Z98.84 V45.86         Plan:     looked up Vyvanse refilled  Chronic back pain with disc desiccation offered [facility] patient will think about let us know discussed getting into yoga program  Constipation following lab band surgery she will be given magnesium citrate 150 cc p.o. x1 thereafter she will take Linzess once daily.      No orders of the defined types were placed in this encounter.      Current Outpatient Medications "   Medication Sig Dispense Refill    cyclobenzaprine (FLEXERIL) 10 MG tablet Take 1 tablet (10 mg total) by mouth 3 (three) times daily. 60 tablet 0    fluticasone (FLONASE) 50 mcg/actuation nasal spray 2 sprays (100 mcg total) by Each Nare route once daily. 16 g 11    [START ON 7/1/2019] lisdexamfetamine (VYVANSE) 40 MG Cap Take 1 capsule (40 mg total) by mouth every morning. 30 capsule 0    [START ON 6/1/2019] lisdexamfetamine (VYVANSE) 40 MG Cap Take 1 capsule (40 mg total) by mouth every morning. 30 capsule 0    lisdexamfetamine (VYVANSE) 40 MG Cap Take 1 capsule (40 mg total) by mouth every morning. 30 capsule 0    valACYclovir (VALTREX) 1000 MG tablet Take 1 tablet by mouth once daily.      linaclotide (LINZESS) 145 mcg Cap capsule Take 1 capsule (145 mcg total) by mouth once daily. 30 capsule 3    magnesium citrate solution Take 148 mLs by mouth once. for 1 dose 1 Bottle 0     No current facility-administered medications for this visit.        Medications Discontinued During This Encounter   Medication Reason    cetirizine (ZYRTEC) 10 MG tablet Patient no longer taking    NATURE-THROID 32.5 mg Tab Patient no longer taking    lisdexamfetamine (VYVANSE) 40 MG Cap Reorder    lisdexamfetamine (VYVANSE) 40 MG Cap Reorder    lisdexamfetamine (VYVANSE) 40 MG Cap Reorder       No follow-ups on file.      Jennifer Summers MD

## 2019-08-07 ENCOUNTER — OFFICE VISIT (OUTPATIENT)
Dept: FAMILY MEDICINE | Facility: CLINIC | Age: 44
End: 2019-08-07
Payer: COMMERCIAL

## 2019-08-07 ENCOUNTER — LAB VISIT (OUTPATIENT)
Dept: LAB | Facility: HOSPITAL | Age: 44
End: 2019-08-07
Attending: FAMILY MEDICINE
Payer: COMMERCIAL

## 2019-08-07 VITALS
OXYGEN SATURATION: 98 % | TEMPERATURE: 99 F | BODY MASS INDEX: 28.05 KG/M2 | WEIGHT: 189.38 LBS | HEART RATE: 92 BPM | DIASTOLIC BLOOD PRESSURE: 64 MMHG | SYSTOLIC BLOOD PRESSURE: 128 MMHG | HEIGHT: 69 IN

## 2019-08-07 DIAGNOSIS — G25.81 RLS (RESTLESS LEGS SYNDROME): ICD-10-CM

## 2019-08-07 DIAGNOSIS — M54.50 CHRONIC BILATERAL LOW BACK PAIN WITHOUT SCIATICA: ICD-10-CM

## 2019-08-07 DIAGNOSIS — F90.9 ADULT ADHD: Primary | ICD-10-CM

## 2019-08-07 DIAGNOSIS — G89.29 CHRONIC BILATERAL LOW BACK PAIN WITHOUT SCIATICA: ICD-10-CM

## 2019-08-07 PROCEDURE — 36415 COLL VENOUS BLD VENIPUNCTURE: CPT | Mod: PO

## 2019-08-07 PROCEDURE — 3008F BODY MASS INDEX DOCD: CPT | Mod: CPTII,S$GLB,, | Performed by: FAMILY MEDICINE

## 2019-08-07 PROCEDURE — 3008F PR BODY MASS INDEX (BMI) DOCUMENTED: ICD-10-PCS | Mod: CPTII,S$GLB,, | Performed by: FAMILY MEDICINE

## 2019-08-07 PROCEDURE — 99999 PR PBB SHADOW E&M-EST. PATIENT-LVL III: ICD-10-PCS | Mod: PBBFAC,,, | Performed by: FAMILY MEDICINE

## 2019-08-07 PROCEDURE — 99214 PR OFFICE/OUTPT VISIT, EST, LEVL IV, 30-39 MIN: ICD-10-PCS | Mod: S$GLB,,, | Performed by: FAMILY MEDICINE

## 2019-08-07 PROCEDURE — 99214 OFFICE O/P EST MOD 30 MIN: CPT | Mod: S$GLB,,, | Performed by: FAMILY MEDICINE

## 2019-08-07 PROCEDURE — 99999 PR PBB SHADOW E&M-EST. PATIENT-LVL III: CPT | Mod: PBBFAC,,, | Performed by: FAMILY MEDICINE

## 2019-08-07 PROCEDURE — 83540 ASSAY OF IRON: CPT

## 2019-08-07 RX ORDER — LISDEXAMFETAMINE DIMESYLATE 40 MG/1
40 CAPSULE ORAL EVERY MORNING
Qty: 30 CAPSULE | Refills: 0 | Status: SHIPPED | OUTPATIENT
Start: 2019-08-07 | End: 2019-11-18

## 2019-08-07 RX ORDER — ROPINIROLE 0.5 MG/1
0.5 TABLET, FILM COATED ORAL NIGHTLY
Qty: 30 TABLET | Refills: 11 | Status: SHIPPED | OUTPATIENT
Start: 2019-08-07 | End: 2020-11-10

## 2019-08-07 RX ORDER — LISDEXAMFETAMINE DIMESYLATE 40 MG/1
40 CAPSULE ORAL EVERY MORNING
Qty: 30 CAPSULE | Refills: 0 | Status: SHIPPED | OUTPATIENT
Start: 2019-09-07 | End: 2019-11-04 | Stop reason: SDUPTHER

## 2019-08-07 RX ORDER — LISDEXAMFETAMINE DIMESYLATE 40 MG/1
40 CAPSULE ORAL EVERY MORNING
Qty: 30 CAPSULE | Refills: 0 | Status: SHIPPED | OUTPATIENT
Start: 2019-10-07 | End: 2019-11-18

## 2019-08-07 NOTE — PROGRESS NOTES
Chief Complaint:    Chief Complaint   Patient presents with    Medication Refill       History of Present Illness:    She is here for refill of Vyvanse working well no side effects  Plan she suffers with chronic back pain it flares up from time to time she has had MRI that showed desiccation of the disc at L5-S1 it does not radiate to her legs no tingling numbness weakness but sometimes it flares up really bad she has tried physical therapy has not helped she is not too interested in [facility].  Also complaining of leg cramps mostly at nighttime  ROS:  Review of Systems   Constitutional: Negative for activity change, chills, fatigue, fever and unexpected weight change.   HENT: Negative for congestion, ear discharge, ear pain, hearing loss, postnasal drip and rhinorrhea.    Eyes: Negative for pain and visual disturbance.   Respiratory: Negative for cough, chest tightness and shortness of breath.    Cardiovascular: Negative for chest pain and palpitations.   Gastrointestinal: Negative for abdominal pain, diarrhea and vomiting.   Endocrine: Negative for heat intolerance.   Genitourinary: Negative for dysuria, flank pain, frequency and hematuria.   Musculoskeletal: Positive for back pain. Negative for gait problem and neck pain.   Skin: Negative for color change and rash.   Neurological: Negative for dizziness, tremors, seizures, numbness and headaches.   Psychiatric/Behavioral: Negative for agitation, hallucinations, self-injury, sleep disturbance and suicidal ideas. The patient is not nervous/anxious.        Past Medical History:   Diagnosis Date    DDD (degenerative disc disease), lumbar     Fatty liver     Nicotine abuse        Social History:  Social History     Socioeconomic History    Marital status:      Spouse name: Not on file    Number of children: Not on file    Years of education: Not on file    Highest education level: Not on file   Occupational History     Employer: homemaker   Social Needs     "Financial resource strain: Not very hard    Food insecurity:     Worry: Never true     Inability: Never true    Transportation needs:     Medical: No     Non-medical: No   Tobacco Use    Smoking status: Former Smoker     Types: Cigarettes     Last attempt to quit: 10/7/2015     Years since quitting: 3.8    Smokeless tobacco: Never Used   Substance and Sexual Activity    Alcohol use: Yes     Alcohol/week: 0.0 oz     Frequency: 2-3 times a week     Drinks per session: 1 or 2     Binge frequency: Less than monthly     Comment: occasionally     Drug use: No    Sexual activity: Yes     Partners: Male     Birth control/protection: None   Lifestyle    Physical activity:     Days per week: Not on file     Minutes per session: Not on file    Stress: Not on file   Relationships    Social connections:     Talks on phone: Not on file     Gets together: Not on file     Attends [employer] service: Not on file     Active member of club or organization: Not on file     Attends meetings of clubs or organizations: Not on file     Relationship status: Not on file   Other Topics Concern    Not on file   Social History Narrative    Not on file       Family History:   family history includes Breast cancer in her maternal grandmother and mother; Cancer in her maternal grandmother; Diabetes in her cousin; Heart attack in her maternal grandmother; Hypertension in her maternal grandmother.    Health Maintenance   Topic Date Due    Pap Smear with HPV Cotest  11/11/2018    Mammogram  10/11/2019    TETANUS VACCINE  03/15/2023    Lipid Panel  Completed       Physical Exam:    Vital Signs  Temp: 98.6 °F (37 °C)  Temp src: Temporal  Pulse: 92  SpO2: 98 %  BP: 128/64  BP Location: Left arm  Patient Position: Sitting  Pain Score: 0-No pain  Height and Weight  Height: 5' 9" (175.3 cm)  Weight: 85.9 kg (189 lb 6 oz)  BSA (Calculated - sq m): 2.04 sq meters  BMI (Calculated): 28  Weight in (lb) to have BMI = 25: 168.9]    Body mass index " is 27.97 kg/m².    Physical Exam   Constitutional: She is oriented to person, place, and time. She appears well-developed.   HENT:   Mouth/Throat: Oropharynx is clear and moist.   Eyes: Pupils are equal, round, and reactive to light. Conjunctivae are normal.   Neck: Normal range of motion. Neck supple.   Cardiovascular: Normal rate, regular rhythm and normal heart sounds.   No murmur heard.  Pulmonary/Chest: Effort normal and breath sounds normal. No respiratory distress. She has no wheezes. She has no rales. She exhibits no tenderness.   Abdominal: Soft. She exhibits no distension and no mass. There is no guarding.   Musculoskeletal: She exhibits no edema.        Lumbar back: She exhibits tenderness.   Straight leg raising test is negative   Lymphadenopathy:     She has no cervical adenopathy.   Neurological: She is alert and oriented to person, place, and time. She has normal reflexes.   Skin: Skin is warm and dry.   Psychiatric: She has a normal mood and affect. Her behavior is normal. Judgment and thought content normal.         Assessment:      ICD-10-CM ICD-9-CM   1. Adult ADHD F90.9 314.01   2. RLS (restless legs syndrome) G25.81 333.94   3. Chronic bilateral low back pain without sciatica M54.5 724.2    G89.29 338.29         Plan:     looked up Vyvanse refilled  Referred to pain clinic also discuss other ways of treating chronic pain and like yoga, acupuncture and others.     Check iron levels recommend a trial of Requip      Orders Placed This Encounter   Procedures    Iron and TIBC    Ambulatory referral to Pain Clinic       Current Outpatient Medications   Medication Sig Dispense Refill    cyclobenzaprine (FLEXERIL) 10 MG tablet Take 1 tablet (10 mg total) by mouth 3 (three) times daily. 60 tablet 0    fluticasone (FLONASE) 50 mcg/actuation nasal spray 2 sprays (100 mcg total) by Each Nare route once daily. 16 g 11    linaclotide (LINZESS) 145 mcg Cap capsule Take 1 capsule (145 mcg total) by  mouth once daily. 30 capsule 3    [START ON 10/7/2019] lisdexamfetamine (VYVANSE) 40 MG Cap Take 1 capsule (40 mg total) by mouth every morning. 30 capsule 0    [START ON 9/7/2019] lisdexamfetamine (VYVANSE) 40 MG Cap Take 1 capsule (40 mg total) by mouth every morning. 30 capsule 0    lisdexamfetamine (VYVANSE) 40 MG Cap Take 1 capsule (40 mg total) by mouth every morning. 30 capsule 0    valACYclovir (VALTREX) 1000 MG tablet Take 1 tablet by mouth once daily.      rOPINIRole (REQUIP) 0.5 MG tablet Take 1 tablet (0.5 mg total) by mouth every evening. 30 tablet 11     No current facility-administered medications for this visit.        Medications Discontinued During This Encounter   Medication Reason    lisdexamfetamine (VYVANSE) 40 MG Cap Reorder    lisdexamfetamine (VYVANSE) 40 MG Cap Reorder    lisdexamfetamine (VYVANSE) 40 MG Cap Reorder       No follow-ups on file.      Jennifer Summers MD

## 2019-08-08 LAB
IRON SERPL-MCNC: 67 UG/DL (ref 30–160)
SATURATED IRON: 16 % (ref 20–50)
TOTAL IRON BINDING CAPACITY: 413 UG/DL (ref 250–450)
TRANSFERRIN SERPL-MCNC: 279 MG/DL (ref 200–375)

## 2019-08-11 DIAGNOSIS — E61.1 IRON DEFICIENCY: Primary | ICD-10-CM

## 2019-08-12 ENCOUNTER — PATIENT MESSAGE (OUTPATIENT)
Dept: FAMILY MEDICINE | Facility: CLINIC | Age: 44
End: 2019-08-12

## 2019-08-14 ENCOUNTER — PATIENT MESSAGE (OUTPATIENT)
Dept: FAMILY MEDICINE | Facility: CLINIC | Age: 44
End: 2019-08-14

## 2019-09-06 ENCOUNTER — PATIENT MESSAGE (OUTPATIENT)
Dept: FAMILY MEDICINE | Facility: CLINIC | Age: 44
End: 2019-09-06

## 2019-09-09 ENCOUNTER — PATIENT MESSAGE (OUTPATIENT)
Dept: FAMILY MEDICINE | Facility: CLINIC | Age: 44
End: 2019-09-09

## 2019-09-09 ENCOUNTER — LAB VISIT (OUTPATIENT)
Dept: LAB | Facility: HOSPITAL | Age: 44
End: 2019-09-09
Attending: FAMILY MEDICINE
Payer: COMMERCIAL

## 2019-09-09 DIAGNOSIS — E61.1 IRON DEFICIENCY: ICD-10-CM

## 2019-09-09 LAB — OB PNL STL: NEGATIVE

## 2019-09-09 PROCEDURE — 82272 OCCULT BLD FECES 1-3 TESTS: CPT

## 2019-09-23 ENCOUNTER — LAB VISIT (OUTPATIENT)
Dept: LAB | Facility: HOSPITAL | Age: 44
End: 2019-09-23
Attending: FAMILY MEDICINE
Payer: COMMERCIAL

## 2019-09-23 DIAGNOSIS — E61.1 IRON DEFICIENCY: ICD-10-CM

## 2019-09-23 PROCEDURE — 82272 OCCULT BLD FECES 1-3 TESTS: CPT

## 2019-09-24 LAB — OB PNL STL: NEGATIVE

## 2019-10-14 ENCOUNTER — PATIENT OUTREACH (OUTPATIENT)
Dept: ADMINISTRATIVE | Facility: HOSPITAL | Age: 44
End: 2019-10-14

## 2019-10-14 NOTE — PROGRESS NOTES
Remove done, no pap results found in chart/ care everywhere. LVM Re updated pap smear information.

## 2019-11-04 ENCOUNTER — LAB VISIT (OUTPATIENT)
Dept: LAB | Facility: HOSPITAL | Age: 44
End: 2019-11-04
Attending: FAMILY MEDICINE
Payer: COMMERCIAL

## 2019-11-04 ENCOUNTER — OFFICE VISIT (OUTPATIENT)
Dept: FAMILY MEDICINE | Facility: CLINIC | Age: 44
End: 2019-11-04
Payer: COMMERCIAL

## 2019-11-04 VITALS
BODY MASS INDEX: 28.01 KG/M2 | WEIGHT: 189.13 LBS | TEMPERATURE: 98 F | HEART RATE: 100 BPM | OXYGEN SATURATION: 99 % | HEIGHT: 69 IN | DIASTOLIC BLOOD PRESSURE: 84 MMHG | SYSTOLIC BLOOD PRESSURE: 116 MMHG

## 2019-11-04 DIAGNOSIS — E61.1 IRON DEFICIENCY: ICD-10-CM

## 2019-11-04 DIAGNOSIS — E34.9 TESTOSTERONE DEFICIENCY: ICD-10-CM

## 2019-11-04 DIAGNOSIS — R53.83 FATIGUE, UNSPECIFIED TYPE: ICD-10-CM

## 2019-11-04 DIAGNOSIS — R68.82 DECREASED LIBIDO: ICD-10-CM

## 2019-11-04 DIAGNOSIS — F90.9 ADULT ADHD: Primary | ICD-10-CM

## 2019-11-04 DIAGNOSIS — M51.36 DDD (DEGENERATIVE DISC DISEASE), LUMBAR: ICD-10-CM

## 2019-11-04 LAB
25(OH)D3+25(OH)D2 SERPL-MCNC: 21 NG/ML (ref 30–96)
ALBUMIN SERPL BCP-MCNC: 4.3 G/DL (ref 3.5–5.2)
ALP SERPL-CCNC: 60 U/L (ref 55–135)
ALT SERPL W/O P-5'-P-CCNC: 17 U/L (ref 10–44)
ANION GAP SERPL CALC-SCNC: 7 MMOL/L (ref 8–16)
AST SERPL-CCNC: 18 U/L (ref 10–40)
BASOPHILS # BLD AUTO: 0.03 K/UL (ref 0–0.2)
BASOPHILS NFR BLD: 0.4 % (ref 0–1.9)
BILIRUB SERPL-MCNC: 0.7 MG/DL (ref 0.1–1)
BUN SERPL-MCNC: 11 MG/DL (ref 6–20)
CALCIUM SERPL-MCNC: 9.9 MG/DL (ref 8.7–10.5)
CHLORIDE SERPL-SCNC: 104 MMOL/L (ref 95–110)
CO2 SERPL-SCNC: 28 MMOL/L (ref 23–29)
CREAT SERPL-MCNC: 0.7 MG/DL (ref 0.5–1.4)
DIFFERENTIAL METHOD: ABNORMAL
EOSINOPHIL # BLD AUTO: 0.1 K/UL (ref 0–0.5)
EOSINOPHIL NFR BLD: 1.4 % (ref 0–8)
ERYTHROCYTE [DISTWIDTH] IN BLOOD BY AUTOMATED COUNT: 12 % (ref 11.5–14.5)
EST. GFR  (AFRICAN AMERICAN): >60 ML/MIN/1.73 M^2
EST. GFR  (NON AFRICAN AMERICAN): >60 ML/MIN/1.73 M^2
FSH SERPL-ACNC: 4.8 MIU/ML
GLUCOSE SERPL-MCNC: 79 MG/DL (ref 70–110)
HCT VFR BLD AUTO: 42.9 % (ref 37–48.5)
HGB BLD-MCNC: 14.1 G/DL (ref 12–16)
IMM GRANULOCYTES # BLD AUTO: 0.01 K/UL (ref 0–0.04)
IMM GRANULOCYTES NFR BLD AUTO: 0.1 % (ref 0–0.5)
LDH SERPL L TO P-CCNC: 165 U/L (ref 110–260)
LH SERPL-ACNC: 5.8 MIU/ML
LYMPHOCYTES # BLD AUTO: 1.5 K/UL (ref 1–4.8)
LYMPHOCYTES NFR BLD: 20.4 % (ref 18–48)
MCH RBC QN AUTO: 31.3 PG (ref 27–31)
MCHC RBC AUTO-ENTMCNC: 32.9 G/DL (ref 32–36)
MCV RBC AUTO: 95 FL (ref 82–98)
MONOCYTES # BLD AUTO: 0.5 K/UL (ref 0.3–1)
MONOCYTES NFR BLD: 7 % (ref 4–15)
NEUTROPHILS # BLD AUTO: 5.1 K/UL (ref 1.8–7.7)
NEUTROPHILS NFR BLD: 70.7 % (ref 38–73)
NRBC BLD-RTO: 0 /100 WBC
PLATELET # BLD AUTO: 229 K/UL (ref 150–350)
PMV BLD AUTO: 10.8 FL (ref 9.2–12.9)
POTASSIUM SERPL-SCNC: 4.7 MMOL/L (ref 3.5–5.1)
PROT SERPL-MCNC: 7.1 G/DL (ref 6–8.4)
RBC # BLD AUTO: 4.5 M/UL (ref 4–5.4)
RETICS/RBC NFR AUTO: 1 % (ref 0.5–2.5)
SODIUM SERPL-SCNC: 139 MMOL/L (ref 136–145)
T3FREE SERPL-MCNC: 3.1 PG/ML (ref 2.3–4.2)
TSH SERPL DL<=0.005 MIU/L-ACNC: 1.26 UIU/ML (ref 0.4–4)
WBC # BLD AUTO: 7.24 K/UL (ref 3.9–12.7)

## 2019-11-04 PROCEDURE — 99999 PR PBB SHADOW E&M-EST. PATIENT-LVL III: CPT | Mod: PBBFAC,,, | Performed by: FAMILY MEDICINE

## 2019-11-04 PROCEDURE — 83001 ASSAY OF GONADOTROPIN (FSH): CPT

## 2019-11-04 PROCEDURE — 80053 COMPREHEN METABOLIC PANEL: CPT

## 2019-11-04 PROCEDURE — 83002 ASSAY OF GONADOTROPIN (LH): CPT

## 2019-11-04 PROCEDURE — 86376 MICROSOMAL ANTIBODY EACH: CPT

## 2019-11-04 PROCEDURE — 82306 VITAMIN D 25 HYDROXY: CPT

## 2019-11-04 PROCEDURE — 99999 PR PBB SHADOW E&M-EST. PATIENT-LVL III: ICD-10-PCS | Mod: PBBFAC,,, | Performed by: FAMILY MEDICINE

## 2019-11-04 PROCEDURE — 84443 ASSAY THYROID STIM HORMONE: CPT

## 2019-11-04 PROCEDURE — 99214 PR OFFICE/OUTPT VISIT, EST, LEVL IV, 30-39 MIN: ICD-10-PCS | Mod: S$GLB,,, | Performed by: FAMILY MEDICINE

## 2019-11-04 PROCEDURE — 3008F PR BODY MASS INDEX (BMI) DOCUMENTED: ICD-10-PCS | Mod: CPTII,S$GLB,, | Performed by: FAMILY MEDICINE

## 2019-11-04 PROCEDURE — 84481 FREE ASSAY (FT-3): CPT

## 2019-11-04 PROCEDURE — 36415 COLL VENOUS BLD VENIPUNCTURE: CPT | Mod: PO

## 2019-11-04 PROCEDURE — 84403 ASSAY OF TOTAL TESTOSTERONE: CPT

## 2019-11-04 PROCEDURE — 83615 LACTATE (LD) (LDH) ENZYME: CPT

## 2019-11-04 PROCEDURE — 82728 ASSAY OF FERRITIN: CPT

## 2019-11-04 PROCEDURE — 85025 COMPLETE CBC W/AUTO DIFF WBC: CPT

## 2019-11-04 PROCEDURE — 99214 OFFICE O/P EST MOD 30 MIN: CPT | Mod: S$GLB,,, | Performed by: FAMILY MEDICINE

## 2019-11-04 PROCEDURE — 85045 AUTOMATED RETICULOCYTE COUNT: CPT

## 2019-11-04 PROCEDURE — 82607 VITAMIN B-12: CPT

## 2019-11-04 PROCEDURE — 3008F BODY MASS INDEX DOCD: CPT | Mod: CPTII,S$GLB,, | Performed by: FAMILY MEDICINE

## 2019-11-04 RX ORDER — PHENTERMINE HYDROCHLORIDE 37.5 MG/1
37.5 CAPSULE ORAL EVERY MORNING
COMMUNITY
End: 2020-09-04

## 2019-11-04 RX ORDER — SPIRONOLACTONE 100 MG/1
100 TABLET, FILM COATED ORAL DAILY
Refills: 11 | Status: ON HOLD | COMMUNITY
Start: 2019-10-28 | End: 2021-12-15

## 2019-11-04 RX ORDER — LISDEXAMFETAMINE DIMESYLATE 60 MG/1
60 CAPSULE ORAL EVERY MORNING
Qty: 30 CAPSULE | Refills: 0 | Status: SHIPPED | OUTPATIENT
Start: 2019-11-06 | End: 2019-11-18 | Stop reason: SDUPTHER

## 2019-11-04 NOTE — PROGRESS NOTES
Chief Complaint:    Chief Complaint   Patient presents with    Follow-up       History of Present Illness:    She says Vyvanse is not lasting as long bilevel o'clock in wears off.  She says she is having to supplement with phentermine  Plan she suffers with chronic back pain it flares up from time to time she has had MRI that showed desiccation of the disc at L5-S1 it does not radiate to her legs no tingling numbness weakness but sometimes it flares up really bad she has tried physical therapy has not helped she is not too interested in [facility].  She months another MRI.  Also complaining of leg cramps mostly at nighttime her iron levels have been low stool tests is been negative  She also has low libido is on hormone therapy with the gynecologist need some labs done.  ROS:  Review of Systems   Constitutional: Negative for activity change, chills, fatigue, fever and unexpected weight change.   HENT: Negative for congestion, ear discharge, ear pain, hearing loss, postnasal drip and rhinorrhea.    Eyes: Negative for pain and visual disturbance.   Respiratory: Negative for cough, chest tightness and shortness of breath.    Cardiovascular: Negative for chest pain and palpitations.   Gastrointestinal: Negative for abdominal pain, diarrhea and vomiting.   Endocrine: Negative for heat intolerance.   Genitourinary: Negative for dysuria, flank pain, frequency and hematuria.   Musculoskeletal: Positive for back pain. Negative for gait problem and neck pain.   Skin: Negative for color change and rash.   Neurological: Negative for dizziness, tremors, seizures, numbness and headaches.   Psychiatric/Behavioral: Negative for agitation, hallucinations, self-injury, sleep disturbance and suicidal ideas. The patient is not nervous/anxious.        Past Medical History:   Diagnosis Date    DDD (degenerative disc disease), lumbar     Fatty liver     Nicotine abuse        Social History:  Social History     Socioeconomic History     Marital status:      Spouse name: Not on file    Number of children: Not on file    Years of education: Not on file    Highest education level: Not on file   Occupational History     Employer: homemaker   Social Needs    Financial resource strain: Not very hard    Food insecurity:     Worry: Never true     Inability: Never true    Transportation needs:     Medical: No     Non-medical: No   Tobacco Use    Smoking status: Former Smoker     Types: Cigarettes     Last attempt to quit: 10/7/2015     Years since quittin.0    Smokeless tobacco: Never Used   Substance and Sexual Activity    Alcohol use: Yes     Alcohol/week: 0.0 standard drinks     Frequency: 2-3 times a week     Drinks per session: 1 or 2     Binge frequency: Less than monthly     Comment: occasionally     Drug use: No    Sexual activity: Yes     Partners: Male     Birth control/protection: None   Lifestyle    Physical activity:     Days per week: Not on file     Minutes per session: Not on file    Stress: Not on file   Relationships    Social connections:     Talks on phone: Not on file     Gets together: Not on file     Attends [employer] service: Not on file     Active member of club or organization: Not on file     Attends meetings of clubs or organizations: Not on file     Relationship status: Not on file   Other Topics Concern    Not on file   Social History Narrative    Not on file       Family History:   family history includes Breast cancer in her maternal grandmother and mother; Cancer in her maternal grandmother; Diabetes in her cousin; Heart attack in her maternal grandmother; Hypertension in her maternal grandmother.    Health Maintenance   Topic Date Due    Pap Smear with HPV Cotest  2018    Mammogram  10/11/2019    TETANUS VACCINE  03/15/2023    Lipid Panel  Completed       Physical Exam:    Vital Signs  Temp: 98.1 °F (36.7 °C)  Temp src: Tympanic  Pulse: 100  SpO2: 99 %  BP: 116/84  BP Location: Left  "arm  Patient Position: Sitting  Pain Score: 0-No pain  Height and Weight  Height: 5' 9" (175.3 cm)  Weight: 85.8 kg (189 lb 2.5 oz)  BSA (Calculated - sq m): 2.04 sq meters  BMI (Calculated): 28  Weight in (lb) to have BMI = 25: 168.9]    Body mass index is 27.93 kg/m².    Physical Exam   Constitutional: She is oriented to person, place, and time. She appears well-developed.   HENT:   Mouth/Throat: Oropharynx is clear and moist.   Eyes: Pupils are equal, round, and reactive to light. Conjunctivae are normal.   Neck: Normal range of motion. Neck supple.   Cardiovascular: Normal rate, regular rhythm and normal heart sounds.   No murmur heard.  Pulmonary/Chest: Effort normal and breath sounds normal. No respiratory distress. She has no wheezes. She has no rales. She exhibits no tenderness.   Abdominal: Soft. She exhibits no distension and no mass. There is no guarding.   Musculoskeletal: She exhibits no edema.        Lumbar back: She exhibits tenderness.   Straight leg raising test is negative   Lymphadenopathy:     She has no cervical adenopathy.   Neurological: She is alert and oriented to person, place, and time. She has normal reflexes.   Skin: Skin is warm and dry.   Psychiatric: She has a normal mood and affect. Her behavior is normal. Judgment and thought content normal.         Assessment:      ICD-10-CM ICD-9-CM   1. Adult ADHD F90.9 314.01   2. DDD (degenerative disc disease), lumbar M51.36 722.52   3. Testosterone deficiency E34.9 259.9   4. Decreased libido R68.82 799.81   5. Fatigue, unspecified type R53.83 780.79         Plan:     looked up Vyvanse will be increased to 60 mg 1 prescription given she will call us back let us know how it is working.  MRI of the lumbar spine will be schedule   Patient choose not to go to the [facility] because she had 1 pain medication no does she want any injections.   Check labs as below    Orders Placed This Encounter   Procedures    MRI Lumbar Spine W WO Contrast "    CBC auto differential    COMPREHENSIVE METABOLIC PANEL    T3, free    TSH    THYROID PEROXIDASE ANTIBODY    Follicle stimulating hormone    Luteinizing hormone    Testosterone    VITAMIN D    Vitamin B12       Current Outpatient Medications   Medication Sig Dispense Refill    cyclobenzaprine (FLEXERIL) 10 MG tablet Take 1 tablet (10 mg total) by mouth 3 (three) times daily. 60 tablet 0    fluticasone (FLONASE) 50 mcg/actuation nasal spray 2 sprays (100 mcg total) by Each Nare route once daily. 16 g 11    lisdexamfetamine (VYVANSE) 40 MG Cap Take 1 capsule (40 mg total) by mouth every morning. 30 capsule 0    lisdexamfetamine (VYVANSE) 40 MG Cap Take 1 capsule (40 mg total) by mouth every morning. 30 capsule 0    [START ON 11/6/2019] lisdexamfetamine (VYVANSE) 60 MG capsule Take 1 capsule (60 mg total) by mouth every morning. 30 capsule 0    phentermine 37.5 MG capsule Take 37.5 mg by mouth every morning.      rOPINIRole (REQUIP) 0.5 MG tablet Take 1 tablet (0.5 mg total) by mouth every evening. 30 tablet 11    valACYclovir (VALTREX) 1000 MG tablet Take 1 tablet by mouth once daily.      linaclotide (LINZESS) 145 mcg Cap capsule Take 1 capsule (145 mcg total) by mouth once daily. 30 capsule 3    spironolactone (ALDACTONE) 100 MG tablet Take 100 mg by mouth once daily.  11     No current facility-administered medications for this visit.        Medications Discontinued During This Encounter   Medication Reason    lisdexamfetamine (VYVANSE) 40 MG Cap Reorder       No follow-ups on file.      Jennifer Summers MD

## 2019-11-05 LAB
FERRITIN SERPL-MCNC: 87 NG/ML (ref 20–300)
TESTOST SERPL-MCNC: 188 NG/DL (ref 5–73)
THYROPEROXIDASE IGG SERPL-ACNC: <6 IU/ML
VIT B12 SERPL-MCNC: 505 PG/ML (ref 210–950)

## 2019-11-07 ENCOUNTER — TELEPHONE (OUTPATIENT)
Dept: FAMILY MEDICINE | Facility: CLINIC | Age: 44
End: 2019-11-07

## 2019-11-07 DIAGNOSIS — E61.1 LOW IRON: Primary | ICD-10-CM

## 2019-11-07 DIAGNOSIS — Z12.39 SCREENING FOR BREAST CANCER: Primary | ICD-10-CM

## 2019-11-08 ENCOUNTER — PATIENT MESSAGE (OUTPATIENT)
Dept: FAMILY MEDICINE | Facility: CLINIC | Age: 44
End: 2019-11-08

## 2019-11-08 DIAGNOSIS — M51.36 DDD (DEGENERATIVE DISC DISEASE), LUMBAR: Primary | ICD-10-CM

## 2019-11-08 NOTE — TELEPHONE ENCOUNTER
Please sign order for patient to go outside for her MRI. She can't do the closed in one that we have.

## 2019-11-12 ENCOUNTER — HOSPITAL ENCOUNTER (OUTPATIENT)
Dept: RADIOLOGY | Facility: HOSPITAL | Age: 44
Discharge: HOME | End: 2019-11-12
Attending: FAMILY MEDICINE
Payer: COMMERCIAL

## 2019-11-12 VITALS — HEIGHT: 69 IN | WEIGHT: 189.13 LBS | BODY MASS INDEX: 28.01 KG/M2

## 2019-11-12 DIAGNOSIS — Z12.39 SCREENING FOR BREAST CANCER: ICD-10-CM

## 2019-11-12 PROCEDURE — 77067 SCR MAMMO BI INCL CAD: CPT | Mod: 26,,, | Performed by: RADIOLOGY

## 2019-11-12 PROCEDURE — 77067 SCR MAMMO BI INCL CAD: CPT | Mod: TC

## 2019-11-12 PROCEDURE — 77063 BREAST TOMOSYNTHESIS BI: CPT | Mod: 26,,, | Performed by: RADIOLOGY

## 2019-11-12 PROCEDURE — 77067 MAMMO DIGITAL SCREENING BILAT WITH TOMOSYNTHESIS_CAD: ICD-10-PCS | Mod: 26,,, | Performed by: RADIOLOGY

## 2019-11-12 PROCEDURE — 77063 MAMMO DIGITAL SCREENING BILAT WITH TOMOSYNTHESIS_CAD: ICD-10-PCS | Mod: 26,,, | Performed by: RADIOLOGY

## 2019-11-18 ENCOUNTER — LAB VISIT (OUTPATIENT)
Dept: LAB | Facility: HOSPITAL | Age: 44
End: 2019-11-18
Attending: FAMILY MEDICINE
Payer: COMMERCIAL

## 2019-11-18 DIAGNOSIS — E61.1 LOW IRON: ICD-10-CM

## 2019-11-18 LAB
IRON SERPL-MCNC: 91 UG/DL (ref 30–160)
SATURATED IRON: 23 % (ref 20–50)
TOTAL IRON BINDING CAPACITY: 391 UG/DL (ref 250–450)
TRANSFERRIN SERPL-MCNC: 264 MG/DL (ref 200–375)

## 2019-11-18 PROCEDURE — 83540 ASSAY OF IRON: CPT

## 2019-11-18 PROCEDURE — 36415 COLL VENOUS BLD VENIPUNCTURE: CPT | Mod: PO

## 2019-11-18 RX ORDER — LISDEXAMFETAMINE DIMESYLATE 60 MG/1
60 CAPSULE ORAL EVERY MORNING
Qty: 30 CAPSULE | Refills: 0 | Status: SHIPPED | OUTPATIENT
Start: 2020-01-06 | End: 2020-05-05

## 2019-11-18 RX ORDER — LISDEXAMFETAMINE DIMESYLATE 60 MG/1
60 CAPSULE ORAL EVERY MORNING
Qty: 30 CAPSULE | Refills: 0 | Status: SHIPPED | OUTPATIENT
Start: 2019-12-06 | End: 2020-02-06 | Stop reason: SDUPTHER

## 2019-12-04 ENCOUNTER — PATIENT MESSAGE (OUTPATIENT)
Dept: FAMILY MEDICINE | Facility: CLINIC | Age: 44
End: 2019-12-04

## 2019-12-05 ENCOUNTER — OFFICE VISIT (OUTPATIENT)
Dept: FAMILY MEDICINE | Facility: CLINIC | Age: 44
End: 2019-12-05
Payer: COMMERCIAL

## 2019-12-05 VITALS
SYSTOLIC BLOOD PRESSURE: 118 MMHG | BODY MASS INDEX: 28.44 KG/M2 | WEIGHT: 192.56 LBS | OXYGEN SATURATION: 100 % | DIASTOLIC BLOOD PRESSURE: 80 MMHG | TEMPERATURE: 98 F | HEART RATE: 100 BPM

## 2019-12-05 DIAGNOSIS — M47.816 SPONDYLOSIS OF LUMBAR REGION WITHOUT MYELOPATHY OR RADICULOPATHY: Primary | ICD-10-CM

## 2019-12-05 PROCEDURE — 3008F BODY MASS INDEX DOCD: CPT | Mod: CPTII,S$GLB,, | Performed by: FAMILY MEDICINE

## 2019-12-05 PROCEDURE — 99999 PR PBB SHADOW E&M-EST. PATIENT-LVL III: ICD-10-PCS | Mod: PBBFAC,,, | Performed by: FAMILY MEDICINE

## 2019-12-05 PROCEDURE — 3008F PR BODY MASS INDEX (BMI) DOCUMENTED: ICD-10-PCS | Mod: CPTII,S$GLB,, | Performed by: FAMILY MEDICINE

## 2019-12-05 PROCEDURE — 99213 PR OFFICE/OUTPT VISIT, EST, LEVL III, 20-29 MIN: ICD-10-PCS | Mod: S$GLB,,, | Performed by: FAMILY MEDICINE

## 2019-12-05 PROCEDURE — 99999 PR PBB SHADOW E&M-EST. PATIENT-LVL III: CPT | Mod: PBBFAC,,, | Performed by: FAMILY MEDICINE

## 2019-12-05 PROCEDURE — 99213 OFFICE O/P EST LOW 20 MIN: CPT | Mod: S$GLB,,, | Performed by: FAMILY MEDICINE

## 2019-12-05 NOTE — PROGRESS NOTES
Chief Complaint:    Chief Complaint   Patient presents with    Follow-up     Results       History of Present Illness:    She is here to discuss the MRI of her back  In the past she has done physical therapy several times that has not helped but she has refused to do injections.  The MRI done on 11/27/2019 is reported as below  The positive findings include minimal disc bulge at L4-L5 level.  Desiccation disc with broad-based annular tear at L5 S1 level.  Degenerative changes of the facet joint at the L4-S1 level without evidence of definitive nerve root impingement.  No abnormal enhancement post contrast material.    ROS:  Review of Systems   Constitutional: Negative for activity change, chills, fatigue, fever and unexpected weight change.   HENT: Negative for congestion, ear discharge, ear pain, hearing loss, postnasal drip and rhinorrhea.    Eyes: Negative for pain and visual disturbance.   Respiratory: Negative for cough, chest tightness and shortness of breath.    Cardiovascular: Negative for chest pain and palpitations.   Gastrointestinal: Negative for abdominal pain, diarrhea and vomiting.   Endocrine: Negative for heat intolerance.   Genitourinary: Negative for dysuria, flank pain, frequency and hematuria.   Musculoskeletal: Positive for back pain. Negative for gait problem and neck pain.   Skin: Negative for color change and rash.   Neurological: Negative for dizziness, tremors, seizures, numbness and headaches.   Psychiatric/Behavioral: Negative for agitation, hallucinations, self-injury, sleep disturbance and suicidal ideas. The patient is not nervous/anxious.        Past Medical History:   Diagnosis Date    DDD (degenerative disc disease), lumbar     Fatty liver     Nicotine abuse        Social History:  Social History     Socioeconomic History    Marital status:      Spouse name: Not on file    Number of children: Not on file    Years of education: Not on file    Highest education  level: Not on file   Occupational History     Employer: homemaker   Social Needs    Financial resource strain: Not very hard    Food insecurity:     Worry: Never true     Inability: Never true    Transportation needs:     Medical: No     Non-medical: No   Tobacco Use    Smoking status: Former Smoker     Types: Cigarettes     Last attempt to quit: 10/7/2015     Years since quittin.1    Smokeless tobacco: Never Used   Substance and Sexual Activity    Alcohol use: Yes     Alcohol/week: 0.0 standard drinks     Frequency: 2-3 times a week     Drinks per session: 1 or 2     Binge frequency: Less than monthly     Comment: occasionally     Drug use: No    Sexual activity: Yes     Partners: Male     Birth control/protection: None   Lifestyle    Physical activity:     Days per week: Not on file     Minutes per session: Not on file    Stress: Not on file   Relationships    Social connections:     Talks on phone: Not on file     Gets together: Not on file     Attends W[employer] service: Not on file     Active member of club or organization: Not on file     Attends meetings of clubs or organizations: Not on file     Relationship status: Not on file   Other Topics Concern    Not on file   Social History Narrative    Not on file       Family History:   family history includes Breast cancer in her maternal grandmother and mother; Cancer in her maternal grandmother; Diabetes in her cousin; Heart attack in her maternal grandmother; Hypertension in her maternal grandmother.    Health Maintenance   Topic Date Due    Pap Smear with HPV Cotest  2018    Mammogram  2020    TETANUS VACCINE  03/15/2023    Lipid Panel  Completed       Physical Exam:    Vital Signs  Temp: 98.2 °F (36.8 °C)  Temp src: Tympanic  Pulse: 100  SpO2: 100 %  BP: 118/80  BP Location: Left arm  Patient Position: Sitting  Pain Score:   6  Pain Loc: Neck  Height and Weight  Weight: 87.4 kg (192 lb 9.2 oz)]    Body mass index is 28.44  kg/m².    Physical Exam   Constitutional: She is oriented to person, place, and time. She appears well-developed.   HENT:   Mouth/Throat: Oropharynx is clear and moist.   Eyes: Pupils are equal, round, and reactive to light. Conjunctivae are normal.   Neck: Normal range of motion. Neck supple.   Cardiovascular: Normal rate, regular rhythm and normal heart sounds.   No murmur heard.  Pulmonary/Chest: Effort normal and breath sounds normal. No respiratory distress. She has no wheezes. She has no rales. She exhibits no tenderness.   Abdominal: Soft. She exhibits no distension and no mass. There is no tenderness. There is no guarding.   Musculoskeletal: She exhibits no edema.        Lumbar back: She exhibits tenderness.   Lymphadenopathy:     She has no cervical adenopathy.   Neurological: She is alert and oriented to person, place, and time. She has normal reflexes.   Skin: Skin is warm and dry.   Psychiatric: She has a normal mood and affect. Her behavior is normal. Judgment and thought content normal.         Assessment:      ICD-10-CM ICD-9-CM   1. Spondylosis of lumbar region without myelopathy or radiculopathy M47.816 721.3         Plan:        She will be referred to [facility].  Patient is not interested in injections.        Orders Placed This Encounter   Procedures    Ambulatory consult to [facility]       Current Outpatient Medications   Medication Sig Dispense Refill    cyclobenzaprine (FLEXERIL) 10 MG tablet Take 1 tablet (10 mg total) by mouth 3 (three) times daily. 60 tablet 0    fluticasone (FLONASE) 50 mcg/actuation nasal spray 2 sprays (100 mcg total) by Each Nare route once daily. 16 g 11    [START ON 12/6/2019] lisdexamfetamine (VYVANSE) 60 MG capsule Take 1 capsule (60 mg total) by mouth every morning. 30 capsule 0    [START ON 1/6/2020] lisdexamfetamine (VYVANSE) 60 MG capsule Take 1 capsule (60 mg total) by mouth every morning. 30 capsule 0    phentermine 37.5 MG  capsule Take 37.5 mg by mouth every morning.      rOPINIRole (REQUIP) 0.5 MG tablet Take 1 tablet (0.5 mg total) by mouth every evening. 30 tablet 11    spironolactone (ALDACTONE) 100 MG tablet Take 100 mg by mouth once daily.  11    valACYclovir (VALTREX) 1000 MG tablet Take 1 tablet by mouth once daily.      linaclotide (LINZESS) 145 mcg Cap capsule Take 1 capsule (145 mcg total) by mouth once daily. 30 capsule 3     No current facility-administered medications for this visit.        There are no discontinued medications.    No follow-ups on file.      Jennifer Summers MD

## 2020-01-20 ENCOUNTER — PATIENT OUTREACH (OUTPATIENT)
Dept: ADMINISTRATIVE | Facility: HOSPITAL | Age: 45
End: 2020-01-20

## 2020-02-04 ENCOUNTER — PATIENT MESSAGE (OUTPATIENT)
Dept: FAMILY MEDICINE | Facility: CLINIC | Age: 45
End: 2020-02-04

## 2020-02-04 ENCOUNTER — PATIENT MESSAGE (OUTPATIENT)
Dept: ADMINISTRATIVE | Facility: OTHER | Age: 45
End: 2020-02-04

## 2020-02-05 DIAGNOSIS — Z13.79 GENETIC SCREENING: ICD-10-CM

## 2020-02-06 ENCOUNTER — OFFICE VISIT (OUTPATIENT)
Dept: FAMILY MEDICINE | Facility: CLINIC | Age: 45
End: 2020-02-06
Payer: COMMERCIAL

## 2020-02-06 DIAGNOSIS — F90.9 ADULT ADHD: Primary | ICD-10-CM

## 2020-02-06 PROCEDURE — 99213 PR OFFICE/OUTPT VISIT, EST, LEVL III, 20-29 MIN: ICD-10-PCS | Mod: 95,,, | Performed by: FAMILY MEDICINE

## 2020-02-06 PROCEDURE — 99213 OFFICE O/P EST LOW 20 MIN: CPT | Mod: 95,,, | Performed by: FAMILY MEDICINE

## 2020-02-06 RX ORDER — MELOXICAM 7.5 MG/1
7.5 TABLET ORAL DAILY
Qty: 30 TABLET | Refills: 0 | Status: SHIPPED | OUTPATIENT
Start: 2020-02-06 | End: 2020-09-04

## 2020-02-06 RX ORDER — LISDEXAMFETAMINE DIMESYLATE 60 MG/1
60 CAPSULE ORAL EVERY MORNING
Qty: 30 CAPSULE | Refills: 0 | Status: SHIPPED | OUTPATIENT
Start: 2020-02-06 | End: 2020-10-29

## 2020-02-06 RX ORDER — LISDEXAMFETAMINE DIMESYLATE 60 MG/1
60 CAPSULE ORAL EVERY MORNING
Qty: 30 CAPSULE | Refills: 0 | Status: SHIPPED | OUTPATIENT
Start: 2020-03-06 | End: 2020-10-29

## 2020-02-06 RX ORDER — LISDEXAMFETAMINE DIMESYLATE 60 MG/1
60 CAPSULE ORAL EVERY MORNING
Qty: 30 CAPSULE | Refills: 0 | Status: SHIPPED | OUTPATIENT
Start: 2020-04-06 | End: 2020-10-29

## 2020-02-06 NOTE — PROGRESS NOTES
Chief Complaint:  No chief complaint on file.      History of Present Illness:    Pt is here for f/u of ADD/ADHD. Doing well, no side effect from the medications, meds helping him concentrate and do what needs done.  Also requesting refill for meloxicam    ROS:  Review of Systems   Constitutional: Negative for activity change, appetite change and unexpected weight change.   Cardiovascular: Negative for palpitations.   Gastrointestinal: Negative for abdominal pain.   Musculoskeletal: Negative for myalgias.   Neurological: Negative for dizziness, tremors, light-headedness and headaches.   Psychiatric/Behavioral: Negative for agitation, behavioral problems, confusion, decreased concentration, dysphoric mood, hallucinations, sleep disturbance and suicidal ideas. The patient is not nervous/anxious and is not hyperactive.      Past Medical History:   Diagnosis Date    DDD (degenerative disc disease), lumbar     Fatty liver     Nicotine abuse        Social History:  Social History     Socioeconomic History    Marital status:      Spouse name: Not on file    Number of children: Not on file    Years of education: Not on file    Highest education level: Not on file   Occupational History     Employer: homemaker   Social Needs    Financial resource strain: Not very hard    Food insecurity:     Worry: Never true     Inability: Never true    Transportation needs:     Medical: No     Non-medical: No   Tobacco Use    Smoking status: Former Smoker     Types: Cigarettes     Last attempt to quit: 10/7/2015     Years since quittin.3    Smokeless tobacco: Never Used   Substance and Sexual Activity    Alcohol use: Yes     Alcohol/week: 0.0 standard drinks     Frequency: 2-3 times a week     Drinks per session: 1 or 2     Binge frequency: Less than monthly     Comment: occasionally     Drug use: No    Sexual activity: Yes     Partners: Male     Birth control/protection: None   Lifestyle    Physical activity:      Days per week: Not on file     Minutes per session: Not on file    Stress: Not on file   Relationships    Social connections:     Talks on phone: Not on file     Gets together: Not on file     Attends [employer] service: Not on file     Active member of club or organization: Not on file     Attends meetings of clubs or organizations: Not on file     Relationship status: Not on file   Other Topics Concern    Not on file   Social History Narrative    Not on file       Family History:   family history includes Breast cancer in her maternal grandmother and mother; Cancer in her maternal grandmother; Diabetes in her cousin; Heart attack in her maternal grandmother; Hypertension in her maternal grandmother.    Health Maintenance   Topic Date Due    Pap Smear with HPV Cotest  09/29/2018    Mammogram  11/12/2020    Lipid Panel  11/06/2022    TETANUS VACCINE  03/15/2023       Physical Exam:     ]         There is no height or weight on file to calculate BMI.      Assessment:      ICD-10-CM ICD-9-CM   1. Adult ADHD F90.9 314.01         Plan:  Please continue current medications.    verified and 3 Rxs issued  F/u 3 mos      No orders of the defined types were placed in this encounter.      Current Outpatient Medications   Medication Sig Dispense Refill    cyclobenzaprine (FLEXERIL) 10 MG tablet Take 1 tablet (10 mg total) by mouth 3 (three) times daily. 60 tablet 0    fluticasone (FLONASE) 50 mcg/actuation nasal spray 2 sprays (100 mcg total) by Each Nare route once daily. 16 g 11    linaclotide (LINZESS) 145 mcg Cap capsule Take 1 capsule (145 mcg total) by mouth once daily. 30 capsule 3    lisdexamfetamine (VYVANSE) 60 MG capsule Take 1 capsule (60 mg total) by mouth every morning. 30 capsule 0    lisdexamfetamine (VYVANSE) 60 MG capsule Take 1 capsule (60 mg total) by mouth every morning. 30 capsule 0    [START ON 3/6/2020] lisdexamfetamine (VYVANSE) 60 MG capsule Take 1 capsule (60 mg total) by mouth every  morning. 30 capsule 0    [START ON 4/6/2020] lisdexamfetamine (VYVANSE) 60 MG capsule Take 1 capsule (60 mg total) by mouth every morning. 30 capsule 0    meloxicam (MOBIC) 7.5 MG tablet Take 1 tablet (7.5 mg total) by mouth once daily. 30 tablet 0    phentermine 37.5 MG capsule Take 37.5 mg by mouth every morning.      rOPINIRole (REQUIP) 0.5 MG tablet Take 1 tablet (0.5 mg total) by mouth every evening. 30 tablet 11    spironolactone (ALDACTONE) 100 MG tablet Take 100 mg by mouth once daily.  11    valACYclovir (VALTREX) 1000 MG tablet Take 1 tablet by mouth once daily.       No current facility-administered medications for this visit.        Medications Discontinued During This Encounter   Medication Reason    lisdexamfetamine (VYVANSE) 60 MG capsule Reorder             Jennifer Summers MD

## 2020-05-05 ENCOUNTER — OFFICE VISIT (OUTPATIENT)
Dept: FAMILY MEDICINE | Facility: CLINIC | Age: 45
End: 2020-05-05
Payer: COMMERCIAL

## 2020-05-05 DIAGNOSIS — M51.36 DDD (DEGENERATIVE DISC DISEASE), LUMBAR: ICD-10-CM

## 2020-05-05 DIAGNOSIS — F90.9 ADULT ADHD: Primary | ICD-10-CM

## 2020-05-05 PROCEDURE — 99213 OFFICE O/P EST LOW 20 MIN: CPT | Mod: 95,,, | Performed by: FAMILY MEDICINE

## 2020-05-05 PROCEDURE — 99213 PR OFFICE/OUTPT VISIT, EST, LEVL III, 20-29 MIN: ICD-10-PCS | Mod: 95,,, | Performed by: FAMILY MEDICINE

## 2020-05-05 RX ORDER — LISDEXAMFETAMINE DIMESYLATE 70 MG/1
70 CAPSULE ORAL EVERY MORNING
Qty: 30 CAPSULE | Refills: 0 | Status: SHIPPED | OUTPATIENT
Start: 2020-05-11 | End: 2020-08-12 | Stop reason: SDUPTHER

## 2020-05-05 RX ORDER — TIZANIDINE 4 MG/1
4 TABLET ORAL EVERY 8 HOURS
Qty: 30 TABLET | Refills: 1 | Status: SHIPPED | OUTPATIENT
Start: 2020-05-05 | End: 2020-08-10

## 2020-05-05 RX ORDER — LISDEXAMFETAMINE DIMESYLATE 70 MG/1
70 CAPSULE ORAL EVERY MORNING
Qty: 30 CAPSULE | Refills: 0 | Status: SHIPPED | OUTPATIENT
Start: 2020-07-11 | End: 2020-08-12 | Stop reason: SDUPTHER

## 2020-05-05 RX ORDER — LISDEXAMFETAMINE DIMESYLATE 70 MG/1
70 CAPSULE ORAL EVERY MORNING
Qty: 30 CAPSULE | Refills: 0 | Status: SHIPPED | OUTPATIENT
Start: 2020-06-11 | End: 2020-06-09 | Stop reason: CLARIF

## 2020-05-05 NOTE — PROGRESS NOTES
Chief Complaint:  No chief complaint on file.      History of Present Illness:    Pt is here for f/u of ADD/ADHD. Doing well, no side effect from the medications, meds helping him concentrate and do what needs done.  Also requesting refill for meloxicam    Wants to increase the dosage because she seems to think that the 60 mg Vyvanse wears off too soon.  She also wants to change the Flexeril to a different 1 because it makes the restless legs so worse.    ROS:  Review of Systems   Constitutional: Negative for activity change, appetite change and unexpected weight change.   Cardiovascular: Negative for palpitations.   Gastrointestinal: Negative for abdominal pain.   Musculoskeletal: Negative for myalgias.   Neurological: Negative for dizziness, tremors, light-headedness and headaches.   Psychiatric/Behavioral: Negative for agitation, behavioral problems, confusion, decreased concentration, dysphoric mood, hallucinations, sleep disturbance and suicidal ideas. The patient is not nervous/anxious and is not hyperactive.      Past Medical History:   Diagnosis Date    DDD (degenerative disc disease), lumbar     Fatty liver     Nicotine abuse        Social History:  Social History     Socioeconomic History    Marital status:      Spouse name: Not on file    Number of children: Not on file    Years of education: Not on file    Highest education level: Not on file   Occupational History     Employer: homemaker   Social Needs    Financial resource strain: Not very hard    Food insecurity:     Worry: Never true     Inability: Never true    Transportation needs:     Medical: No     Non-medical: No   Tobacco Use    Smoking status: Former Smoker     Types: Cigarettes     Last attempt to quit: 10/7/2015     Years since quittin.5    Smokeless tobacco: Never Used   Substance and Sexual Activity    Alcohol use: Yes     Alcohol/week: 0.0 standard drinks     Frequency: 2-3 times a week     Drinks per session: 1  or 2     Binge frequency: Less than monthly     Comment: occasionally     Drug use: No    Sexual activity: Yes     Partners: Male     Birth control/protection: None   Lifestyle    Physical activity:     Days per week: Not on file     Minutes per session: Not on file    Stress: Not on file   Relationships    Social connections:     Talks on phone: Not on file     Gets together: Not on file     Attends Adventism service: Not on file     Active member of club or organization: Not on file     Attends meetings of clubs or organizations: Not on file     Relationship status: Not on file   Other Topics Concern    Not on file   Social History Narrative    Not on file       Family History:   family history includes Breast cancer in her maternal grandmother and mother; Cancer in her maternal grandmother; Diabetes in her cousin; Heart attack in her maternal grandmother; Hypertension in her maternal grandmother.    Health Maintenance   Topic Date Due    Pap Smear with HPV Cotest  09/29/2018    Mammogram  11/12/2020    Lipid Panel  11/06/2022    TETANUS VACCINE  03/15/2023       Physical Exam:     ]         There is no height or weight on file to calculate BMI.      Assessment:      ICD-10-CM ICD-9-CM   1. Adult ADHD F90.9 314.01         Plan:  Please continue current medications.    verified and 3 Rxs issued  F/u 3 mos  The patient location is: Home   The chief complaint leading to consultation is: as below  Visit type: Virtual visit with synchronous audio and video  Total time spent with patient: 20+ mins  Each patient to whom he or she provides medical services by telemedicine is:  (1) informed of the relationship between the physician and patient and the respective role of any other health care provider with respect to management of the patient; and (2) notified that he or she may decline to receive medical services by telemedicine and may withdraw from such care at any time.    Notes: see below      No orders  of the defined types were placed in this encounter.      Current Outpatient Medications   Medication Sig Dispense Refill    cyclobenzaprine (FLEXERIL) 10 MG tablet Take 1 tablet (10 mg total) by mouth 3 (three) times daily. 60 tablet 0    fluticasone (FLONASE) 50 mcg/actuation nasal spray 2 sprays (100 mcg total) by Each Nare route once daily. 16 g 11    linaclotide (LINZESS) 145 mcg Cap capsule Take 1 capsule (145 mcg total) by mouth once daily. 30 capsule 3    lisdexamfetamine (VYVANSE) 60 MG capsule Take 1 capsule (60 mg total) by mouth every morning. 30 capsule 0    lisdexamfetamine (VYVANSE) 60 MG capsule Take 1 capsule (60 mg total) by mouth every morning. 30 capsule 0    lisdexamfetamine (VYVANSE) 60 MG capsule Take 1 capsule (60 mg total) by mouth every morning. 30 capsule 0    [START ON 5/11/2020] lisdexamfetamine (VYVANSE) 70 MG capsule Take 1 capsule (70 mg total) by mouth every morning. 30 capsule 0    [START ON 6/11/2020] lisdexamfetamine (VYVANSE) 70 MG capsule Take 1 capsule (70 mg total) by mouth every morning. 30 capsule 0    [START ON 7/11/2020] lisdexamfetamine (VYVANSE) 70 MG capsule Take 1 capsule (70 mg total) by mouth every morning. 30 capsule 0    meloxicam (MOBIC) 7.5 MG tablet Take 1 tablet (7.5 mg total) by mouth once daily. 30 tablet 0    phentermine 37.5 MG capsule Take 37.5 mg by mouth every morning.      rOPINIRole (REQUIP) 0.5 MG tablet Take 1 tablet (0.5 mg total) by mouth every evening. 30 tablet 11    spironolactone (ALDACTONE) 100 MG tablet Take 100 mg by mouth once daily.  11    valACYclovir (VALTREX) 1000 MG tablet Take 1 tablet by mouth once daily.       No current facility-administered medications for this visit.        Medications Discontinued During This Encounter   Medication Reason    lisdexamfetamine (VYVANSE) 60 MG capsule              Jennifer Summers MD

## 2020-06-08 ENCOUNTER — PATIENT MESSAGE (OUTPATIENT)
Dept: FAMILY MEDICINE | Facility: CLINIC | Age: 45
End: 2020-06-08

## 2020-06-08 ENCOUNTER — TELEPHONE (OUTPATIENT)
Dept: FAMILY MEDICINE | Facility: CLINIC | Age: 45
End: 2020-06-08

## 2020-06-08 NOTE — TELEPHONE ENCOUNTER
Vyvanse 60mg prescription needs to be backed up one day. Refill date was put in for 2 days after prescription runs out. Please advise.

## 2020-06-08 NOTE — TELEPHONE ENCOUNTER
She is requesting that you back her Vyvanse Rx back one day , the 11th falls on day 30 and she will not have a pill for the next morning and she doesn't want to get up early and go get the RX before work

## 2020-06-08 NOTE — TELEPHONE ENCOUNTER
----- Message from Elke Loo sent at 6/8/2020 12:31 PM CDT -----  Contact: Hilario Hollins [phone]  Early refill on Vyvanse.  Drs office put the 11th and they show no later than the 10th.    Also next month's will fall on the 30th day. Please advise.

## 2020-06-08 NOTE — TELEPHONE ENCOUNTER
Called patient and advised that vyvance prescription is written correctly for monthly refills sent in May 2020. Advised patient that Dr. Summers would not give early refills.

## 2020-06-09 DIAGNOSIS — F90.9 ADULT ADHD: Primary | ICD-10-CM

## 2020-06-09 RX ORDER — LISDEXAMFETAMINE DIMESYLATE 70 MG/1
70 CAPSULE ORAL EVERY MORNING
Qty: 30 CAPSULE | Refills: 0 | Status: SHIPPED | OUTPATIENT
Start: 2020-06-10 | End: 2020-08-12 | Stop reason: SDUPTHER

## 2020-07-10 ENCOUNTER — PATIENT OUTREACH (OUTPATIENT)
Dept: ADMINISTRATIVE | Facility: HOSPITAL | Age: 45
End: 2020-07-10

## 2020-07-10 NOTE — PROGRESS NOTES
Chart review, no results found in [facility] Everywhere, legacy documents or [facility]. Call to check the status of most recent pap smear screening. Patient not available, left voicemail.

## 2020-08-12 ENCOUNTER — OFFICE VISIT (OUTPATIENT)
Dept: FAMILY MEDICINE | Facility: CLINIC | Age: 45
End: 2020-08-12
Payer: COMMERCIAL

## 2020-08-12 DIAGNOSIS — F90.9 ADULT ADHD: Primary | ICD-10-CM

## 2020-08-12 PROCEDURE — 99213 OFFICE O/P EST LOW 20 MIN: CPT | Mod: 95,,, | Performed by: FAMILY MEDICINE

## 2020-08-12 PROCEDURE — 99213 PR OFFICE/OUTPT VISIT, EST, LEVL III, 20-29 MIN: ICD-10-PCS | Mod: 95,,, | Performed by: FAMILY MEDICINE

## 2020-08-12 RX ORDER — LISDEXAMFETAMINE DIMESYLATE 70 MG/1
70 CAPSULE ORAL EVERY MORNING
Qty: 30 CAPSULE | Refills: 0 | Status: SHIPPED | OUTPATIENT
Start: 2020-10-12 | End: 2020-10-29 | Stop reason: SDUPTHER

## 2020-08-12 RX ORDER — LISDEXAMFETAMINE DIMESYLATE 70 MG/1
70 CAPSULE ORAL EVERY MORNING
Qty: 30 CAPSULE | Refills: 0 | Status: SHIPPED | OUTPATIENT
Start: 2020-09-12 | End: 2020-10-29 | Stop reason: SDUPTHER

## 2020-08-12 RX ORDER — LISDEXAMFETAMINE DIMESYLATE 70 MG/1
70 CAPSULE ORAL EVERY MORNING
Qty: 30 CAPSULE | Refills: 0 | Status: SHIPPED | OUTPATIENT
Start: 2020-08-12 | End: 2021-07-06 | Stop reason: SDUPTHER

## 2020-08-12 NOTE — PROGRESS NOTES
Chief Complaint:  No chief complaint on file.      History of Present Illness:    Pt is here for f/u of ADD/ADHD. Doing well, no side effect from the medications, meds helping him concentrate and do what needs done.  Also requesting refill for meloxicam      ROS:  Review of Systems   Constitutional: Negative for activity change, appetite change and unexpected weight change.   Cardiovascular: Negative for palpitations.   Gastrointestinal: Negative for abdominal pain.   Musculoskeletal: Negative for myalgias.   Neurological: Negative for dizziness, tremors, light-headedness and headaches.   Psychiatric/Behavioral: Negative for agitation, behavioral problems, confusion, decreased concentration, dysphoric mood, hallucinations, sleep disturbance and suicidal ideas. The patient is not nervous/anxious and is not hyperactive.      Past Medical History:   Diagnosis Date    DDD (degenerative disc disease), lumbar     Fatty liver     Nicotine abuse        Social History:  Social History     Socioeconomic History    Marital status:      Spouse name: Not on file    Number of children: Not on file    Years of education: Not on file    Highest education level: Not on file   Occupational History     Employer: homemaker   Social Needs    Financial resource strain: Not very hard    Food insecurity     Worry: Never true     Inability: Never true    Transportation needs     Medical: No     Non-medical: No   Tobacco Use    Smoking status: Former Smoker     Types: Cigarettes     Quit date: 10/7/2015     Years since quittin.8    Smokeless tobacco: Never Used   Substance and Sexual Activity    Alcohol use: Yes     Alcohol/week: 0.0 standard drinks     Frequency: 2-3 times a week     Drinks per session: 1 or 2     Binge frequency: Less than monthly     Comment: occasionally     Drug use: No    Sexual activity: Yes     Partners: Male     Birth control/protection: None   Lifestyle    Physical activity     Days per  week: Not on file     Minutes per session: Not on file    Stress: Not on file   Relationships    Social connections     Talks on phone: Not on file     Gets together: Not on file     Attends Denominational service: Not on file     Active member of club or organization: Not on file     Attends meetings of clubs or organizations: Not on file     Relationship status: Not on file   Other Topics Concern    Not on file   Social History Narrative    Not on file       Family History:   family history includes Breast cancer in her maternal grandmother and mother; Cancer in her maternal grandmother; Diabetes in her cousin; Heart attack in her maternal grandmother; Hypertension in her maternal grandmother.    Health Maintenance   Topic Date Due    Mammogram  11/12/2020    Lipid Panel  11/06/2022    TETANUS VACCINE  03/15/2023    Hepatitis C Screening  Completed       Physical Exam:     ]         There is no height or weight on file to calculate BMI.      Assessment:      ICD-10-CM ICD-9-CM   1. Adult ADHD  F90.9 314.01         Plan:  Please continue current medications.    verified and 3 Rxs issued  F/u 3 mos  The patient location is: Home   The chief complaint leading to consultation is: as below  Visit type: Virtual visit with synchronous audio and video  Total time spent with patient: 20+ mins  Each patient to whom he or she provides medical services by telemedicine is:  (1) informed of the relationship between the physician and patient and the respective role of any other health care provider with respect to management of the patient; and (2) notified that he or she may decline to receive medical services by telemedicine and may withdraw from such care at any time.    Notes: see below      No orders of the defined types were placed in this encounter.      Current Outpatient Medications   Medication Sig Dispense Refill    fluticasone propionate (FLONASE) 50 mcg/actuation nasal spray USE TWO SPRAYS IN EACH NOSTRIL  ONCE DAILY 16 g 11    linaclotide (LINZESS) 145 mcg Cap capsule Take 1 capsule (145 mcg total) by mouth once daily. 30 capsule 3    lisdexamfetamine (VYVANSE) 60 MG capsule Take 1 capsule (60 mg total) by mouth every morning. 30 capsule 0    lisdexamfetamine (VYVANSE) 60 MG capsule Take 1 capsule (60 mg total) by mouth every morning. 30 capsule 0    lisdexamfetamine (VYVANSE) 60 MG capsule Take 1 capsule (60 mg total) by mouth every morning. 30 capsule 0    [START ON 10/12/2020] lisdexamfetamine (VYVANSE) 70 MG capsule Take 1 capsule (70 mg total) by mouth every morning. 30 capsule 0    [START ON 9/12/2020] lisdexamfetamine (VYVANSE) 70 MG capsule Take 1 capsule (70 mg total) by mouth every morning. 30 capsule 0    lisdexamfetamine (VYVANSE) 70 MG capsule Take 1 capsule (70 mg total) by mouth every morning. 30 capsule 0    meloxicam (MOBIC) 7.5 MG tablet Take 1 tablet (7.5 mg total) by mouth once daily. 30 tablet 0    phentermine 37.5 MG capsule Take 37.5 mg by mouth every morning.      rOPINIRole (REQUIP) 0.5 MG tablet Take 1 tablet (0.5 mg total) by mouth every evening. 30 tablet 11    spironolactone (ALDACTONE) 100 MG tablet Take 100 mg by mouth once daily.  11    tiZANidine (ZANAFLEX) 4 MG tablet TAKE ONE TABLET BY MOUTH EVERY 8 HOURS 30 tablet 1    valACYclovir (VALTREX) 1000 MG tablet Take 1 tablet by mouth once daily.       No current facility-administered medications for this visit.        Medications Discontinued During This Encounter   Medication Reason    lisdexamfetamine (VYVANSE) 70 MG capsule Reorder    lisdexamfetamine (VYVANSE) 70 MG capsule Reorder    lisdexamfetamine (VYVANSE) 70 MG capsule Reorder             Jennifer Summers MD

## 2020-09-04 ENCOUNTER — OFFICE VISIT (OUTPATIENT)
Dept: FAMILY MEDICINE | Facility: CLINIC | Age: 45
End: 2020-09-04
Payer: COMMERCIAL

## 2020-09-04 VITALS
HEART RATE: 82 BPM | DIASTOLIC BLOOD PRESSURE: 78 MMHG | WEIGHT: 196.19 LBS | SYSTOLIC BLOOD PRESSURE: 110 MMHG | BODY MASS INDEX: 29.06 KG/M2 | HEIGHT: 69 IN | OXYGEN SATURATION: 100 %

## 2020-09-04 DIAGNOSIS — S83.412A SPRAIN OF MEDIAL COLLATERAL LIGAMENT OF LEFT KNEE, INITIAL ENCOUNTER: Primary | ICD-10-CM

## 2020-09-04 PROCEDURE — 3008F PR BODY MASS INDEX (BMI) DOCUMENTED: ICD-10-PCS | Mod: CPTII,S$GLB,, | Performed by: FAMILY MEDICINE

## 2020-09-04 PROCEDURE — 3008F BODY MASS INDEX DOCD: CPT | Mod: CPTII,S$GLB,, | Performed by: FAMILY MEDICINE

## 2020-09-04 PROCEDURE — 99999 PR PBB SHADOW E&M-EST. PATIENT-LVL IV: CPT | Mod: PBBFAC,,, | Performed by: FAMILY MEDICINE

## 2020-09-04 PROCEDURE — 99999 PR PBB SHADOW E&M-EST. PATIENT-LVL IV: ICD-10-PCS | Mod: PBBFAC,,, | Performed by: FAMILY MEDICINE

## 2020-09-04 PROCEDURE — 99213 PR OFFICE/OUTPT VISIT, EST, LEVL III, 20-29 MIN: ICD-10-PCS | Mod: S$GLB,,, | Performed by: FAMILY MEDICINE

## 2020-09-04 PROCEDURE — 99213 OFFICE O/P EST LOW 20 MIN: CPT | Mod: S$GLB,,, | Performed by: FAMILY MEDICINE

## 2020-09-04 RX ORDER — CELECOXIB 200 MG/1
200 CAPSULE ORAL DAILY
Qty: 30 CAPSULE | Refills: 0 | Status: SHIPPED | OUTPATIENT
Start: 2020-09-04 | End: 2023-07-24

## 2020-09-06 NOTE — PROGRESS NOTES
Chief Complaint:    Chief Complaint   Patient presents with    Knee Pain     left knee pain for weeks , no injury       History of Present Illness:    Complaining knee pain after she did some work squatting in the knees mostly left knee hurts to bend certain way.    ROS:  Review of Systems    Past Medical History:   Diagnosis Date    DDD (degenerative disc disease), lumbar     Fatty liver     Nicotine abuse        Social History:  Social History     Socioeconomic History    Marital status:      Spouse name: Not on file    Number of children: Not on file    Years of education: Not on file    Highest education level: Not on file   Occupational History     Employer: homemaker   Social Needs    Financial resource strain: Not very hard    Food insecurity     Worry: Never true     Inability: Never true    Transportation needs     Medical: No     Non-medical: No   Tobacco Use    Smoking status: Former Smoker     Types: Cigarettes     Quit date: 10/7/2015     Years since quittin.9    Smokeless tobacco: Never Used   Substance and Sexual Activity    Alcohol use: Yes     Alcohol/week: 0.0 standard drinks     Frequency: 2-3 times a week     Drinks per session: 1 or 2     Binge frequency: Patient refused     Comment: occasionally     Drug use: No    Sexual activity: Yes     Partners: Male     Birth control/protection: None   Lifestyle    Physical activity     Days per week: 2 days     Minutes per session: 30 min    Stress: Not at all   Relationships    Social connections     Talks on phone: More than three times a week     Gets together: More than three times a week     Attends Christian service: Not on file     Active member of club or organization: No     Attends meetings of clubs or organizations: Patient refused     Relationship status:    Other Topics Concern    Not on file   Social History Narrative    Not on file       Family History:   family history includes Breast cancer in her  "maternal grandmother and mother; Cancer in her maternal grandmother; Diabetes in her cousin; Heart attack in her maternal grandmother; Hypertension in her maternal grandmother.    Health Maintenance   Topic Date Due    Mammogram  11/12/2020    Lipid Panel  11/06/2022    TETANUS VACCINE  03/15/2023    Hepatitis C Screening  Completed       Physical Exam:    Vital Signs  Pulse: 82  SpO2: 100 %  BP: 110/78  Pain Score:   4  Pain Loc: Knee(knee pain)  Height and Weight  Height: 5' 9" (175.3 cm)  Weight: 89 kg (196 lb 3.4 oz)  BSA (Calculated - sq m): 2.08 sq meters  BMI (Calculated): 29  Weight in (lb) to have BMI = 25: 168.9]    Body mass index is 28.98 kg/m².    Physical Exam  Musculoskeletal:      Left knee: She exhibits no bony tenderness, normal meniscus and no MCL laxity. Tenderness found. Medial joint line and MCL tenderness noted. No patellar tendon tenderness noted.           Assessment:      ICD-10-CM ICD-9-CM   1. Sprain of medial collateral ligament of left knee, initial encounter  S83.412A 844.1         Plan:         Recommend using knee brace for couple of weeks then starting some passive mobility exercises follow-up active       No orders of the defined types were placed in this encounter.      Current Outpatient Medications   Medication Sig Dispense Refill    lisdexamfetamine (VYVANSE) 60 MG capsule Take 1 capsule (60 mg total) by mouth every morning. 30 capsule 0    lisdexamfetamine (VYVANSE) 60 MG capsule Take 1 capsule (60 mg total) by mouth every morning. 30 capsule 0    lisdexamfetamine (VYVANSE) 60 MG capsule Take 1 capsule (60 mg total) by mouth every morning. 30 capsule 0    [START ON 10/12/2020] lisdexamfetamine (VYVANSE) 70 MG capsule Take 1 capsule (70 mg total) by mouth every morning. 30 capsule 0    [START ON 9/12/2020] lisdexamfetamine (VYVANSE) 70 MG capsule Take 1 capsule (70 mg total) by mouth every morning. 30 capsule 0    lisdexamfetamine (VYVANSE) 70 MG capsule Take 1 " capsule (70 mg total) by mouth every morning. 30 capsule 0    rOPINIRole (REQUIP) 0.5 MG tablet Take 1 tablet (0.5 mg total) by mouth every evening. 30 tablet 11    spironolactone (ALDACTONE) 100 MG tablet Take 100 mg by mouth once daily.  11    celecoxib (CELEBREX) 200 MG capsule Take 1 capsule (200 mg total) by mouth once daily. 30 capsule 0    fluticasone propionate (FLONASE) 50 mcg/actuation nasal spray USE TWO SPRAYS IN EACH NOSTRIL ONCE DAILY 16 g 11    linaclotide (LINZESS) 145 mcg Cap capsule Take 1 capsule (145 mcg total) by mouth once daily. (Patient not taking: Reported on 9/4/2020) 30 capsule 3    tiZANidine (ZANAFLEX) 4 MG tablet TAKE ONE TABLET BY MOUTH EVERY 8 HOURS (Patient not taking: Reported on 9/4/2020) 30 tablet 1    valACYclovir (VALTREX) 1000 MG tablet Take 1 tablet by mouth once daily.       No current facility-administered medications for this visit.        Medications Discontinued During This Encounter   Medication Reason    phentermine 37.5 MG capsule     meloxicam (MOBIC) 7.5 MG tablet        No follow-ups on file.      Jennifer Summers MD

## 2020-09-30 ENCOUNTER — PATIENT MESSAGE (OUTPATIENT)
Dept: FAMILY MEDICINE | Facility: CLINIC | Age: 45
End: 2020-09-30

## 2020-10-05 ENCOUNTER — PATIENT MESSAGE (OUTPATIENT)
Dept: FAMILY MEDICINE | Facility: CLINIC | Age: 45
End: 2020-10-05

## 2020-10-29 ENCOUNTER — OFFICE VISIT (OUTPATIENT)
Dept: FAMILY MEDICINE | Facility: CLINIC | Age: 45
End: 2020-10-29
Payer: COMMERCIAL

## 2020-10-29 DIAGNOSIS — F90.9 ADULT ADHD: Primary | ICD-10-CM

## 2020-10-29 DIAGNOSIS — R09.81 SINUS CONGESTION: ICD-10-CM

## 2020-10-29 PROCEDURE — 99213 OFFICE O/P EST LOW 20 MIN: CPT | Mod: 95,,, | Performed by: FAMILY MEDICINE

## 2020-10-29 PROCEDURE — 99213 PR OFFICE/OUTPT VISIT, EST, LEVL III, 20-29 MIN: ICD-10-PCS | Mod: 95,,, | Performed by: FAMILY MEDICINE

## 2020-10-29 RX ORDER — LISDEXAMFETAMINE DIMESYLATE 70 MG/1
70 CAPSULE ORAL EVERY MORNING
Qty: 30 CAPSULE | Refills: 0 | Status: SHIPPED | OUTPATIENT
Start: 2021-01-11 | End: 2021-01-29 | Stop reason: SDUPTHER

## 2020-10-29 RX ORDER — LISDEXAMFETAMINE DIMESYLATE 70 MG/1
70 CAPSULE ORAL EVERY MORNING
Qty: 30 CAPSULE | Refills: 0 | Status: SHIPPED | OUTPATIENT
Start: 2020-11-11 | End: 2021-01-29 | Stop reason: SDUPTHER

## 2020-10-29 RX ORDER — LISDEXAMFETAMINE DIMESYLATE 70 MG/1
70 CAPSULE ORAL EVERY MORNING
Qty: 30 CAPSULE | Refills: 0 | Status: SHIPPED | OUTPATIENT
Start: 2020-12-11 | End: 2021-01-29 | Stop reason: SDUPTHER

## 2020-10-29 NOTE — PROGRESS NOTES
Chief Complaint:  No chief complaint on file.      History of Present Illness:  Here for refill of ADHD medication working well  Also has some sinus pressure last few days no yellow-green discharge no fever      ROS:  Review of Systems   Constitutional: Negative for activity change and unexpected weight change.   HENT: Negative for hearing loss, rhinorrhea and trouble swallowing.    Eyes: Negative for discharge and visual disturbance.   Respiratory: Negative for chest tightness and wheezing.    Cardiovascular: Negative for chest pain and palpitations.   Gastrointestinal: Negative for blood in stool, constipation, diarrhea and vomiting.   Endocrine: Negative for polydipsia and polyuria.   Genitourinary: Negative for difficulty urinating, dysuria and hematuria.   Musculoskeletal: Negative for arthralgias, joint swelling and neck pain.   Neurological: Negative for weakness and headaches.   Psychiatric/Behavioral: Negative for confusion and dysphoric mood.       Past Medical History:   Diagnosis Date    DDD (degenerative disc disease), lumbar     Fatty liver     Nicotine abuse        Social History:  Social History     Socioeconomic History    Marital status:      Spouse name: Not on file    Number of children: Not on file    Years of education: Not on file    Highest education level: Not on file   Occupational History     Employer: homemaker   Social Needs    Financial resource strain: Not very hard    Food insecurity     Worry: Never true     Inability: Never true    Transportation needs     Medical: No     Non-medical: No   Tobacco Use    Smoking status: Former Smoker     Types: Cigarettes     Quit date: 10/7/2015     Years since quittin.0    Smokeless tobacco: Never Used   Substance and Sexual Activity    Alcohol use: Yes     Alcohol/week: 0.0 standard drinks     Frequency: 2-3 times a week     Drinks per session: 1 or 2     Binge frequency: Patient refused     Comment: occasionally      Drug use: No    Sexual activity: Yes     Partners: Male     Birth control/protection: None   Lifestyle    Physical activity     Days per week: 2 days     Minutes per session: 30 min    Stress: Not at all   Relationships    Social connections     Talks on phone: More than three times a week     Gets together: More than three times a week     Attends Scientology service: Not on file     Active member of club or organization: No     Attends meetings of clubs or organizations: Patient refused     Relationship status:    Other Topics Concern    Not on file   Social History Narrative    Not on file       Family History:   family history includes Breast cancer in her maternal grandmother and mother; Cancer in her maternal grandmother; Diabetes in her cousin; Heart attack in her maternal grandmother; Hypertension in her maternal grandmother.    Health Maintenance   Topic Date Due    Mammogram  11/12/2020    Lipid Panel  11/06/2022    TETANUS VACCINE  03/15/2023    Hepatitis C Screening  Completed       Physical Exam:     ]    There is no height or weight on file to calculate BMI.    Physical Exam      Assessment:      ICD-10-CM ICD-9-CM   1. Adult ADHD  F90.9 314.01   2. Sinus congestion  R09.81 478.19         Plan:          looked up Vyvanse refilled and sent to the pharmacy  Okay to use Flonase and Mucinex for right now sinuses, if they get worse please let us know  The patient location is: Home   The chief complaint leading to consultation is: as below  Visit type: Virtual visit with synchronous audio and video  Total time spent with patient: 20+ mins  Each patient to whom he or she provides medical services by telemedicine is:  (1) informed of the relationship between the physician and patient and the respective role of any other health care provider with respect to management of the patient; and (2) notified that he or she may decline to receive medical services by telemedicine and may withdraw from  such care at any time.    Notes: see below        No orders of the defined types were placed in this encounter.      Current Outpatient Medications   Medication Sig Dispense Refill    celecoxib (CELEBREX) 200 MG capsule Take 1 capsule (200 mg total) by mouth once daily. 30 capsule 0    fluticasone propionate (FLONASE) 50 mcg/actuation nasal spray USE TWO SPRAYS IN EACH NOSTRIL ONCE DAILY 16 g 11    linaclotide (LINZESS) 145 mcg Cap capsule Take 1 capsule (145 mcg total) by mouth once daily. (Patient not taking: Reported on 9/4/2020) 30 capsule 3    lisdexamfetamine (VYVANSE) 70 MG capsule Take 1 capsule (70 mg total) by mouth every morning. 30 capsule 0    [START ON 1/11/2021] lisdexamfetamine (VYVANSE) 70 MG capsule Take 1 capsule (70 mg total) by mouth every morning. 30 capsule 0    [START ON 12/11/2020] lisdexamfetamine (VYVANSE) 70 MG capsule Take 1 capsule (70 mg total) by mouth every morning. 30 capsule 0    [START ON 11/11/2020] lisdexamfetamine (VYVANSE) 70 MG capsule Take 1 capsule (70 mg total) by mouth every morning. 30 capsule 0    rOPINIRole (REQUIP) 0.5 MG tablet Take 1 tablet (0.5 mg total) by mouth every evening. 30 tablet 11    spironolactone (ALDACTONE) 100 MG tablet Take 100 mg by mouth once daily.  11    tiZANidine (ZANAFLEX) 4 MG tablet TAKE ONE TABLET BY MOUTH EVERY 8 HOURS (Patient not taking: Reported on 9/4/2020) 30 tablet 1    valACYclovir (VALTREX) 1000 MG tablet Take 1 tablet by mouth once daily.       No current facility-administered medications for this visit.        Medications Discontinued During This Encounter   Medication Reason    lisdexamfetamine (VYVANSE) 60 MG capsule     lisdexamfetamine (VYVANSE) 60 MG capsule     lisdexamfetamine (VYVANSE) 60 MG capsule     lisdexamfetamine (VYVANSE) 70 MG capsule Reorder    lisdexamfetamine (VYVANSE) 70 MG capsule Reorder       No follow-ups on file.      Jennifer Summers MD

## 2020-11-20 DIAGNOSIS — Z12.31 OTHER SCREENING MAMMOGRAM: ICD-10-CM

## 2021-01-29 ENCOUNTER — OFFICE VISIT (OUTPATIENT)
Dept: FAMILY MEDICINE | Facility: CLINIC | Age: 46
End: 2021-01-29
Payer: COMMERCIAL

## 2021-01-29 DIAGNOSIS — F90.9 ADULT ADHD: Primary | ICD-10-CM

## 2021-01-29 DIAGNOSIS — G25.81 RLS (RESTLESS LEGS SYNDROME): ICD-10-CM

## 2021-01-29 PROCEDURE — 99213 OFFICE O/P EST LOW 20 MIN: CPT | Mod: 95,,, | Performed by: FAMILY MEDICINE

## 2021-01-29 PROCEDURE — 99213 PR OFFICE/OUTPT VISIT, EST, LEVL III, 20-29 MIN: ICD-10-PCS | Mod: 95,,, | Performed by: FAMILY MEDICINE

## 2021-01-29 RX ORDER — LISDEXAMFETAMINE DIMESYLATE 70 MG/1
70 CAPSULE ORAL EVERY MORNING
Qty: 30 CAPSULE | Refills: 0 | Status: SHIPPED | OUTPATIENT
Start: 2021-02-11 | End: 2021-05-14 | Stop reason: SDUPTHER

## 2021-01-29 RX ORDER — LISDEXAMFETAMINE DIMESYLATE 70 MG/1
70 CAPSULE ORAL EVERY MORNING
Qty: 30 CAPSULE | Refills: 0 | Status: SHIPPED | OUTPATIENT
Start: 2021-03-13 | End: 2021-05-14 | Stop reason: SDUPTHER

## 2021-01-29 RX ORDER — LISDEXAMFETAMINE DIMESYLATE 70 MG/1
70 CAPSULE ORAL EVERY MORNING
Qty: 30 CAPSULE | Refills: 0 | Status: SHIPPED | OUTPATIENT
Start: 2021-04-13 | End: 2021-05-14 | Stop reason: SDUPTHER

## 2021-02-15 ENCOUNTER — PATIENT MESSAGE (OUTPATIENT)
Dept: FAMILY MEDICINE | Facility: CLINIC | Age: 46
End: 2021-02-15

## 2021-02-18 ENCOUNTER — OFFICE VISIT (OUTPATIENT)
Dept: FAMILY MEDICINE | Facility: CLINIC | Age: 46
End: 2021-02-18
Payer: COMMERCIAL

## 2021-02-18 ENCOUNTER — TELEPHONE (OUTPATIENT)
Dept: PHYSICAL MEDICINE AND REHAB | Facility: CLINIC | Age: 46
End: 2021-02-18

## 2021-02-18 VITALS
RESPIRATION RATE: 20 BRPM | DIASTOLIC BLOOD PRESSURE: 78 MMHG | WEIGHT: 193.44 LBS | TEMPERATURE: 97 F | HEIGHT: 69 IN | OXYGEN SATURATION: 97 % | BODY MASS INDEX: 28.65 KG/M2 | SYSTOLIC BLOOD PRESSURE: 120 MMHG | HEART RATE: 104 BPM

## 2021-02-18 DIAGNOSIS — K59.00 CONSTIPATION, UNSPECIFIED CONSTIPATION TYPE: ICD-10-CM

## 2021-02-18 DIAGNOSIS — R20.0 BILATERAL HAND NUMBNESS: Primary | ICD-10-CM

## 2021-02-18 DIAGNOSIS — F90.9 ADULT ADHD: ICD-10-CM

## 2021-02-18 PROCEDURE — 99999 PR PBB SHADOW E&M-EST. PATIENT-LVL III: ICD-10-PCS | Mod: PBBFAC,,, | Performed by: FAMILY MEDICINE

## 2021-02-18 PROCEDURE — 3008F BODY MASS INDEX DOCD: CPT | Mod: CPTII,S$GLB,, | Performed by: FAMILY MEDICINE

## 2021-02-18 PROCEDURE — 99214 PR OFFICE/OUTPT VISIT, EST, LEVL IV, 30-39 MIN: ICD-10-PCS | Mod: S$GLB,,, | Performed by: FAMILY MEDICINE

## 2021-02-18 PROCEDURE — 99999 PR PBB SHADOW E&M-EST. PATIENT-LVL III: CPT | Mod: PBBFAC,,, | Performed by: FAMILY MEDICINE

## 2021-02-18 PROCEDURE — 3008F PR BODY MASS INDEX (BMI) DOCUMENTED: ICD-10-PCS | Mod: CPTII,S$GLB,, | Performed by: FAMILY MEDICINE

## 2021-02-18 PROCEDURE — 99214 OFFICE O/P EST MOD 30 MIN: CPT | Mod: S$GLB,,, | Performed by: FAMILY MEDICINE

## 2021-02-19 ENCOUNTER — HOSPITAL ENCOUNTER (OUTPATIENT)
Dept: RADIOLOGY | Facility: HOSPITAL | Age: 46
Discharge: HOME | End: 2021-02-19
Attending: FAMILY MEDICINE
Payer: COMMERCIAL

## 2021-02-19 ENCOUNTER — TELEPHONE (OUTPATIENT)
Dept: PHYSICAL MEDICINE AND REHAB | Facility: CLINIC | Age: 46
End: 2021-02-19

## 2021-02-19 VITALS — HEIGHT: 69 IN | WEIGHT: 193.56 LBS | BODY MASS INDEX: 28.67 KG/M2

## 2021-02-19 DIAGNOSIS — Z12.31 OTHER SCREENING MAMMOGRAM: ICD-10-CM

## 2021-02-19 PROCEDURE — 77067 MAMMO DIGITAL SCREENING BILAT WITH TOMO: ICD-10-PCS | Mod: 26,,, | Performed by: RADIOLOGY

## 2021-02-19 PROCEDURE — 77067 SCR MAMMO BI INCL CAD: CPT | Mod: 26,,, | Performed by: RADIOLOGY

## 2021-02-19 PROCEDURE — 77063 MAMMO DIGITAL SCREENING BILAT WITH TOMO: ICD-10-PCS | Mod: 26,,, | Performed by: RADIOLOGY

## 2021-02-19 PROCEDURE — 77067 SCR MAMMO BI INCL CAD: CPT | Mod: TC

## 2021-02-19 PROCEDURE — 77063 BREAST TOMOSYNTHESIS BI: CPT | Mod: 26,,, | Performed by: RADIOLOGY

## 2021-03-19 ENCOUNTER — PATIENT OUTREACH (OUTPATIENT)
Dept: ADMINISTRATIVE | Facility: OTHER | Age: 46
End: 2021-03-19

## 2021-03-22 ENCOUNTER — OFFICE VISIT (OUTPATIENT)
Dept: PHYSICAL MEDICINE AND REHAB | Facility: CLINIC | Age: 46
End: 2021-03-22
Payer: COMMERCIAL

## 2021-03-22 VITALS
SYSTOLIC BLOOD PRESSURE: 138 MMHG | HEIGHT: 69 IN | RESPIRATION RATE: 12 BRPM | HEART RATE: 97 BPM | DIASTOLIC BLOOD PRESSURE: 96 MMHG | WEIGHT: 193 LBS | BODY MASS INDEX: 28.58 KG/M2

## 2021-03-22 DIAGNOSIS — M79.18 CERVICAL MYOFASCIAL PAIN SYNDROME: ICD-10-CM

## 2021-03-22 DIAGNOSIS — G56.03 BILATERAL CARPAL TUNNEL SYNDROME: Primary | ICD-10-CM

## 2021-03-22 PROCEDURE — 95911 PR NERVE CONDUCTION STUDY; 9-10 STUDIES: ICD-10-PCS | Mod: S$GLB,,, | Performed by: PHYSICAL MEDICINE & REHABILITATION

## 2021-03-22 PROCEDURE — 99999 PR PBB SHADOW E&M-EST. PATIENT-LVL III: ICD-10-PCS | Mod: PBBFAC,,, | Performed by: PHYSICAL MEDICINE & REHABILITATION

## 2021-03-22 PROCEDURE — 1126F AMNT PAIN NOTED NONE PRSNT: CPT | Mod: S$GLB,,, | Performed by: PHYSICAL MEDICINE & REHABILITATION

## 2021-03-22 PROCEDURE — 3008F BODY MASS INDEX DOCD: CPT | Mod: CPTII,S$GLB,, | Performed by: PHYSICAL MEDICINE & REHABILITATION

## 2021-03-22 PROCEDURE — 95911 NRV CNDJ TEST 9-10 STUDIES: CPT | Mod: S$GLB,,, | Performed by: PHYSICAL MEDICINE & REHABILITATION

## 2021-03-22 PROCEDURE — 99204 PR OFFICE/OUTPT VISIT, NEW, LEVL IV, 45-59 MIN: ICD-10-PCS | Mod: 25,S$GLB,, | Performed by: PHYSICAL MEDICINE & REHABILITATION

## 2021-03-22 PROCEDURE — 99999 PR PBB SHADOW E&M-EST. PATIENT-LVL III: CPT | Mod: PBBFAC,,, | Performed by: PHYSICAL MEDICINE & REHABILITATION

## 2021-03-22 PROCEDURE — 99204 OFFICE O/P NEW MOD 45 MIN: CPT | Mod: 25,S$GLB,, | Performed by: PHYSICAL MEDICINE & REHABILITATION

## 2021-03-22 PROCEDURE — 3008F PR BODY MASS INDEX (BMI) DOCUMENTED: ICD-10-PCS | Mod: CPTII,S$GLB,, | Performed by: PHYSICAL MEDICINE & REHABILITATION

## 2021-03-22 PROCEDURE — 1126F PR PAIN SEVERITY QUANTIFIED, NO PAIN PRESENT: ICD-10-PCS | Mod: S$GLB,,, | Performed by: PHYSICAL MEDICINE & REHABILITATION

## 2021-05-14 ENCOUNTER — OFFICE VISIT (OUTPATIENT)
Dept: FAMILY MEDICINE | Facility: CLINIC | Age: 46
End: 2021-05-14
Payer: COMMERCIAL

## 2021-05-14 DIAGNOSIS — F90.9 ADULT ADHD: Primary | ICD-10-CM

## 2021-05-14 PROCEDURE — 99213 PR OFFICE/OUTPT VISIT, EST, LEVL III, 20-29 MIN: ICD-10-PCS | Mod: 95,,, | Performed by: FAMILY MEDICINE

## 2021-05-14 PROCEDURE — 99213 OFFICE O/P EST LOW 20 MIN: CPT | Mod: 95,,, | Performed by: FAMILY MEDICINE

## 2021-05-14 RX ORDER — LISDEXAMFETAMINE DIMESYLATE 70 MG/1
70 CAPSULE ORAL EVERY MORNING
Qty: 30 CAPSULE | Refills: 0 | Status: SHIPPED | OUTPATIENT
Start: 2021-06-14 | End: 2021-09-07 | Stop reason: SDUPTHER

## 2021-05-14 RX ORDER — LISDEXAMFETAMINE DIMESYLATE 70 MG/1
70 CAPSULE ORAL EVERY MORNING
Qty: 30 CAPSULE | Refills: 0 | Status: SHIPPED | OUTPATIENT
Start: 2021-05-14 | End: 2021-09-07 | Stop reason: SDUPTHER

## 2021-05-14 RX ORDER — LISDEXAMFETAMINE DIMESYLATE 70 MG/1
70 CAPSULE ORAL EVERY MORNING
Qty: 30 CAPSULE | Refills: 0 | Status: SHIPPED | OUTPATIENT
Start: 2021-07-14 | End: 2021-09-07 | Stop reason: SDUPTHER

## 2021-05-26 ENCOUNTER — PATIENT MESSAGE (OUTPATIENT)
Dept: FAMILY MEDICINE | Facility: CLINIC | Age: 46
End: 2021-05-26

## 2021-05-26 ENCOUNTER — OFFICE VISIT (OUTPATIENT)
Dept: FAMILY MEDICINE | Facility: CLINIC | Age: 46
End: 2021-05-26
Payer: COMMERCIAL

## 2021-05-26 VITALS
TEMPERATURE: 98 F | WEIGHT: 192.25 LBS | HEIGHT: 69 IN | HEART RATE: 101 BPM | DIASTOLIC BLOOD PRESSURE: 67 MMHG | BODY MASS INDEX: 28.47 KG/M2 | OXYGEN SATURATION: 99 % | SYSTOLIC BLOOD PRESSURE: 121 MMHG

## 2021-05-26 DIAGNOSIS — J02.9 PHARYNGITIS, UNSPECIFIED ETIOLOGY: Primary | ICD-10-CM

## 2021-05-26 PROCEDURE — 99213 PR OFFICE/OUTPT VISIT, EST, LEVL III, 20-29 MIN: ICD-10-PCS | Mod: S$GLB,,, | Performed by: NURSE PRACTITIONER

## 2021-05-26 PROCEDURE — 3008F BODY MASS INDEX DOCD: CPT | Mod: CPTII,S$GLB,, | Performed by: NURSE PRACTITIONER

## 2021-05-26 PROCEDURE — 3008F PR BODY MASS INDEX (BMI) DOCUMENTED: ICD-10-PCS | Mod: CPTII,S$GLB,, | Performed by: NURSE PRACTITIONER

## 2021-05-26 PROCEDURE — 99999 PR PBB SHADOW E&M-EST. PATIENT-LVL IV: ICD-10-PCS | Mod: PBBFAC,,, | Performed by: NURSE PRACTITIONER

## 2021-05-26 PROCEDURE — 99213 OFFICE O/P EST LOW 20 MIN: CPT | Mod: S$GLB,,, | Performed by: NURSE PRACTITIONER

## 2021-05-26 PROCEDURE — 1125F AMNT PAIN NOTED PAIN PRSNT: CPT | Mod: S$GLB,,, | Performed by: NURSE PRACTITIONER

## 2021-05-26 PROCEDURE — 99999 PR PBB SHADOW E&M-EST. PATIENT-LVL IV: CPT | Mod: PBBFAC,,, | Performed by: NURSE PRACTITIONER

## 2021-05-26 PROCEDURE — 1125F PR PAIN SEVERITY QUANTIFIED, PAIN PRESENT: ICD-10-PCS | Mod: S$GLB,,, | Performed by: NURSE PRACTITIONER

## 2021-05-26 RX ORDER — AZELASTINE 1 MG/ML
1 SPRAY, METERED NASAL 2 TIMES DAILY
Qty: 30 ML | Refills: 0 | Status: SHIPPED | OUTPATIENT
Start: 2021-05-26 | End: 2022-11-10

## 2021-07-06 ENCOUNTER — PATIENT MESSAGE (OUTPATIENT)
Dept: FAMILY MEDICINE | Facility: CLINIC | Age: 46
End: 2021-07-06

## 2021-07-06 DIAGNOSIS — F90.9 ADULT ADHD: ICD-10-CM

## 2021-07-06 RX ORDER — LISDEXAMFETAMINE DIMESYLATE 70 MG/1
70 CAPSULE ORAL EVERY MORNING
Qty: 30 CAPSULE | Refills: 0 | Status: SHIPPED | OUTPATIENT
Start: 2021-07-06 | End: 2021-08-05

## 2021-09-07 ENCOUNTER — OFFICE VISIT (OUTPATIENT)
Dept: FAMILY MEDICINE | Facility: CLINIC | Age: 46
End: 2021-09-07
Payer: COMMERCIAL

## 2021-09-07 DIAGNOSIS — F90.9 ADULT ADHD: Primary | ICD-10-CM

## 2021-09-07 PROCEDURE — 1160F RVW MEDS BY RX/DR IN RCRD: CPT | Mod: CPTII,,, | Performed by: FAMILY MEDICINE

## 2021-09-07 PROCEDURE — 99213 OFFICE O/P EST LOW 20 MIN: CPT | Mod: 95,,, | Performed by: FAMILY MEDICINE

## 2021-09-07 PROCEDURE — 1160F PR REVIEW ALL MEDS BY PRESCRIBER/CLIN PHARMACIST DOCUMENTED: ICD-10-PCS | Mod: CPTII,,, | Performed by: FAMILY MEDICINE

## 2021-09-07 PROCEDURE — 99213 PR OFFICE/OUTPT VISIT, EST, LEVL III, 20-29 MIN: ICD-10-PCS | Mod: 95,,, | Performed by: FAMILY MEDICINE

## 2021-09-07 PROCEDURE — 1159F MED LIST DOCD IN RCRD: CPT | Mod: CPTII,,, | Performed by: FAMILY MEDICINE

## 2021-09-07 PROCEDURE — 1159F PR MEDICATION LIST DOCUMENTED IN MEDICAL RECORD: ICD-10-PCS | Mod: CPTII,,, | Performed by: FAMILY MEDICINE

## 2021-09-07 RX ORDER — LISDEXAMFETAMINE DIMESYLATE 70 MG/1
70 CAPSULE ORAL EVERY MORNING
Qty: 30 CAPSULE | Refills: 0 | Status: SHIPPED | OUTPATIENT
Start: 2021-11-07 | End: 2021-12-20 | Stop reason: SDUPTHER

## 2021-09-07 RX ORDER — LISDEXAMFETAMINE DIMESYLATE 70 MG/1
70 CAPSULE ORAL EVERY MORNING
Qty: 30 CAPSULE | Refills: 0 | Status: ON HOLD | OUTPATIENT
Start: 2021-10-07 | End: 2021-12-15 | Stop reason: HOSPADM

## 2021-09-07 RX ORDER — LISDEXAMFETAMINE DIMESYLATE 70 MG/1
70 CAPSULE ORAL EVERY MORNING
Qty: 30 CAPSULE | Refills: 0 | Status: SHIPPED | OUTPATIENT
Start: 2021-09-07 | End: 2021-12-20 | Stop reason: SDUPTHER

## 2021-09-13 ENCOUNTER — PATIENT MESSAGE (OUTPATIENT)
Dept: FAMILY MEDICINE | Facility: CLINIC | Age: 46
End: 2021-09-13

## 2021-10-08 ENCOUNTER — TELEPHONE (OUTPATIENT)
Dept: PAIN MEDICINE | Facility: CLINIC | Age: 46
End: 2021-10-08

## 2021-11-02 ENCOUNTER — TELEPHONE (OUTPATIENT)
Dept: PAIN MEDICINE | Facility: CLINIC | Age: 46
End: 2021-11-02
Payer: COMMERCIAL

## 2021-11-03 ENCOUNTER — OFFICE VISIT (OUTPATIENT)
Dept: PAIN MEDICINE | Facility: CLINIC | Age: 46
End: 2021-11-03
Payer: COMMERCIAL

## 2021-11-03 ENCOUNTER — HOSPITAL ENCOUNTER (OUTPATIENT)
Dept: RADIOLOGY | Facility: HOSPITAL | Age: 46
Discharge: HOME | End: 2021-11-03
Attending: ANESTHESIOLOGY
Payer: COMMERCIAL

## 2021-11-03 VITALS
DIASTOLIC BLOOD PRESSURE: 87 MMHG | BODY MASS INDEX: 28.31 KG/M2 | SYSTOLIC BLOOD PRESSURE: 131 MMHG | HEIGHT: 69 IN | WEIGHT: 191.13 LBS | RESPIRATION RATE: 17 BRPM | HEART RATE: 93 BPM

## 2021-11-03 DIAGNOSIS — M70.61 GREATER TROCHANTERIC BURSITIS OF BOTH HIPS: ICD-10-CM

## 2021-11-03 DIAGNOSIS — M47.816 LUMBAR FACET ARTHROPATHY: ICD-10-CM

## 2021-11-03 DIAGNOSIS — M70.62 GREATER TROCHANTERIC BURSITIS OF BOTH HIPS: ICD-10-CM

## 2021-11-03 DIAGNOSIS — M47.816 LUMBAR SPONDYLOSIS: Primary | ICD-10-CM

## 2021-11-03 DIAGNOSIS — M53.3 SACROILIAC JOINT PAIN: ICD-10-CM

## 2021-11-03 DIAGNOSIS — M79.18 MYOFASCIAL PAIN: ICD-10-CM

## 2021-11-03 DIAGNOSIS — M47.812 FACET ARTHROPATHY, CERVICAL: ICD-10-CM

## 2021-11-03 DIAGNOSIS — M51.36 DDD (DEGENERATIVE DISC DISEASE), LUMBAR: ICD-10-CM

## 2021-11-03 PROCEDURE — 3075F SYST BP GE 130 - 139MM HG: CPT | Mod: CPTII,S$GLB,, | Performed by: ANESTHESIOLOGY

## 2021-11-03 PROCEDURE — 1159F MED LIST DOCD IN RCRD: CPT | Mod: CPTII,S$GLB,, | Performed by: ANESTHESIOLOGY

## 2021-11-03 PROCEDURE — 99999 PR PBB SHADOW E&M-EST. PATIENT-LVL IV: CPT | Mod: PBBFAC,,, | Performed by: ANESTHESIOLOGY

## 2021-11-03 PROCEDURE — 3008F PR BODY MASS INDEX (BMI) DOCUMENTED: ICD-10-PCS | Mod: CPTII,S$GLB,, | Performed by: ANESTHESIOLOGY

## 2021-11-03 PROCEDURE — 72040 X-RAY EXAM NECK SPINE 2-3 VW: CPT | Mod: TC

## 2021-11-03 PROCEDURE — 3008F BODY MASS INDEX DOCD: CPT | Mod: CPTII,S$GLB,, | Performed by: ANESTHESIOLOGY

## 2021-11-03 PROCEDURE — 72040 X-RAY EXAM NECK SPINE 2-3 VW: CPT | Mod: 26,,, | Performed by: RADIOLOGY

## 2021-11-03 PROCEDURE — 3079F PR MOST RECENT DIASTOLIC BLOOD PRESSURE 80-89 MM HG: ICD-10-PCS | Mod: CPTII,S$GLB,, | Performed by: ANESTHESIOLOGY

## 2021-11-03 PROCEDURE — 99204 PR OFFICE/OUTPT VISIT, NEW, LEVL IV, 45-59 MIN: ICD-10-PCS | Mod: S$GLB,,, | Performed by: ANESTHESIOLOGY

## 2021-11-03 PROCEDURE — 72040 XR CERVICAL SPINE FLEXION  AND EXTENSION ONLY: ICD-10-PCS | Mod: 26,,, | Performed by: RADIOLOGY

## 2021-11-03 PROCEDURE — 99204 OFFICE O/P NEW MOD 45 MIN: CPT | Mod: S$GLB,,, | Performed by: ANESTHESIOLOGY

## 2021-11-03 PROCEDURE — 99999 PR PBB SHADOW E&M-EST. PATIENT-LVL IV: ICD-10-PCS | Mod: PBBFAC,,, | Performed by: ANESTHESIOLOGY

## 2021-11-03 PROCEDURE — 3079F DIAST BP 80-89 MM HG: CPT | Mod: CPTII,S$GLB,, | Performed by: ANESTHESIOLOGY

## 2021-11-03 PROCEDURE — 3075F PR MOST RECENT SYSTOLIC BLOOD PRESS GE 130-139MM HG: ICD-10-PCS | Mod: CPTII,S$GLB,, | Performed by: ANESTHESIOLOGY

## 2021-11-03 PROCEDURE — 1159F PR MEDICATION LIST DOCUMENTED IN MEDICAL RECORD: ICD-10-PCS | Mod: CPTII,S$GLB,, | Performed by: ANESTHESIOLOGY

## 2021-11-03 RX ORDER — NABUMETONE 500 MG/1
500 TABLET, FILM COATED ORAL 2 TIMES DAILY PRN
Qty: 60 TABLET | Refills: 0 | Status: SHIPPED | OUTPATIENT
Start: 2021-11-03 | End: 2021-12-03

## 2021-12-03 ENCOUNTER — OFFICE VISIT (OUTPATIENT)
Dept: PAIN MEDICINE | Facility: CLINIC | Age: 46
End: 2021-12-03
Payer: COMMERCIAL

## 2021-12-03 VITALS
SYSTOLIC BLOOD PRESSURE: 125 MMHG | HEIGHT: 69 IN | DIASTOLIC BLOOD PRESSURE: 79 MMHG | WEIGHT: 195.19 LBS | HEART RATE: 98 BPM | BODY MASS INDEX: 28.91 KG/M2

## 2021-12-03 DIAGNOSIS — M46.1 SACROILIITIS: Primary | ICD-10-CM

## 2021-12-03 DIAGNOSIS — M79.12 MYALGIA OF AUXILIARY MUSCLES, HEAD AND NECK: ICD-10-CM

## 2021-12-03 PROCEDURE — 99999 PR PBB SHADOW E&M-EST. PATIENT-LVL III: CPT | Mod: PBBFAC,,, | Performed by: PHYSICAL MEDICINE & REHABILITATION

## 2021-12-03 PROCEDURE — 99214 OFFICE O/P EST MOD 30 MIN: CPT | Mod: S$GLB,,, | Performed by: PHYSICAL MEDICINE & REHABILITATION

## 2021-12-03 PROCEDURE — 99999 PR PBB SHADOW E&M-EST. PATIENT-LVL III: ICD-10-PCS | Mod: PBBFAC,,, | Performed by: PHYSICAL MEDICINE & REHABILITATION

## 2021-12-03 PROCEDURE — 99214 PR OFFICE/OUTPT VISIT, EST, LEVL IV, 30-39 MIN: ICD-10-PCS | Mod: S$GLB,,, | Performed by: PHYSICAL MEDICINE & REHABILITATION

## 2021-12-15 ENCOUNTER — HOSPITAL ENCOUNTER (OUTPATIENT)
Facility: HOSPITAL | Age: 46
Discharge: HOME | End: 2021-12-15
Attending: PHYSICAL MEDICINE & REHABILITATION | Admitting: PHYSICAL MEDICINE & REHABILITATION
Payer: COMMERCIAL

## 2021-12-15 VITALS
SYSTOLIC BLOOD PRESSURE: 120 MMHG | DIASTOLIC BLOOD PRESSURE: 80 MMHG | RESPIRATION RATE: 18 BRPM | WEIGHT: 194 LBS | HEART RATE: 80 BPM | TEMPERATURE: 98 F | HEIGHT: 69 IN | OXYGEN SATURATION: 100 % | BODY MASS INDEX: 28.73 KG/M2

## 2021-12-15 DIAGNOSIS — M46.1 SACROILIITIS: Primary | ICD-10-CM

## 2021-12-15 PROCEDURE — 27096 PR INJECTION,SACROILIAC JOINT: ICD-10-PCS | Mod: LT,,, | Performed by: PHYSICAL MEDICINE & REHABILITATION

## 2021-12-15 PROCEDURE — 25000003 PHARM REV CODE 250: Performed by: PHYSICAL MEDICINE & REHABILITATION

## 2021-12-15 PROCEDURE — 20552 NJX 1/MLT TRIGGER POINT 1/2: CPT | Mod: 50 | Performed by: PHYSICAL MEDICINE & REHABILITATION

## 2021-12-15 PROCEDURE — 63600175 PHARM REV CODE 636 W HCPCS: Performed by: PHYSICAL MEDICINE & REHABILITATION

## 2021-12-15 PROCEDURE — 27096 INJECT SACROILIAC JOINT: CPT | Performed by: PHYSICAL MEDICINE & REHABILITATION

## 2021-12-15 PROCEDURE — 27096 INJECT SACROILIAC JOINT: CPT | Mod: LT,,, | Performed by: PHYSICAL MEDICINE & REHABILITATION

## 2021-12-15 PROCEDURE — 25500020 PHARM REV CODE 255: Performed by: PHYSICAL MEDICINE & REHABILITATION

## 2021-12-15 PROCEDURE — 20552 NJX 1/MLT TRIGGER POINT 1/2: CPT | Mod: 59,,, | Performed by: PHYSICAL MEDICINE & REHABILITATION

## 2021-12-15 PROCEDURE — 20552 PR INJECT TRIGGER POINT, 1 OR 2: ICD-10-PCS | Mod: 59,,, | Performed by: PHYSICAL MEDICINE & REHABILITATION

## 2021-12-15 RX ORDER — ONDANSETRON 2 MG/ML
4 INJECTION INTRAMUSCULAR; INTRAVENOUS ONCE AS NEEDED
Status: DISCONTINUED | OUTPATIENT
Start: 2021-12-15 | End: 2021-12-15 | Stop reason: HOSPADM

## 2021-12-15 RX ORDER — METHYLPREDNISOLONE ACETATE 40 MG/ML
INJECTION, SUSPENSION INTRA-ARTICULAR; INTRALESIONAL; INTRAMUSCULAR; SOFT TISSUE
Status: DISCONTINUED | OUTPATIENT
Start: 2021-12-15 | End: 2021-12-15 | Stop reason: HOSPADM

## 2021-12-15 RX ORDER — BUPIVACAINE HYDROCHLORIDE 2.5 MG/ML
INJECTION, SOLUTION EPIDURAL; INFILTRATION; INTRACAUDAL
Status: DISCONTINUED | OUTPATIENT
Start: 2021-12-15 | End: 2021-12-15 | Stop reason: HOSPADM

## 2021-12-15 RX ORDER — MIDAZOLAM HYDROCHLORIDE 1 MG/ML
INJECTION, SOLUTION INTRAMUSCULAR; INTRAVENOUS
Status: DISCONTINUED | OUTPATIENT
Start: 2021-12-15 | End: 2021-12-15 | Stop reason: HOSPADM

## 2021-12-15 RX ORDER — FENTANYL CITRATE 50 UG/ML
INJECTION, SOLUTION INTRAMUSCULAR; INTRAVENOUS
Status: DISCONTINUED | OUTPATIENT
Start: 2021-12-15 | End: 2021-12-15 | Stop reason: HOSPADM

## 2021-12-20 ENCOUNTER — OFFICE VISIT (OUTPATIENT)
Dept: FAMILY MEDICINE | Facility: CLINIC | Age: 46
End: 2021-12-20
Payer: COMMERCIAL

## 2021-12-20 DIAGNOSIS — F90.9 ADULT ADHD: Primary | ICD-10-CM

## 2021-12-20 PROCEDURE — 99213 OFFICE O/P EST LOW 20 MIN: CPT | Mod: 95,,, | Performed by: FAMILY MEDICINE

## 2021-12-20 PROCEDURE — 99213 PR OFFICE/OUTPT VISIT, EST, LEVL III, 20-29 MIN: ICD-10-PCS | Mod: 95,,, | Performed by: FAMILY MEDICINE

## 2021-12-20 RX ORDER — LISDEXAMFETAMINE DIMESYLATE 70 MG/1
70 CAPSULE ORAL EVERY MORNING
Qty: 30 CAPSULE | Refills: 0 | Status: SHIPPED | OUTPATIENT
Start: 2022-01-20 | End: 2022-01-24 | Stop reason: SDUPTHER

## 2021-12-20 RX ORDER — LISDEXAMFETAMINE DIMESYLATE 70 MG/1
70 CAPSULE ORAL EVERY MORNING
Qty: 30 CAPSULE | Refills: 0 | Status: SHIPPED | OUTPATIENT
Start: 2021-12-20 | End: 2022-01-24 | Stop reason: SDUPTHER

## 2021-12-20 RX ORDER — LISDEXAMFETAMINE DIMESYLATE 70 MG/1
70 CAPSULE ORAL EVERY MORNING
Qty: 30 CAPSULE | Refills: 0 | Status: SHIPPED | OUTPATIENT
Start: 2022-02-20 | End: 2022-03-22 | Stop reason: SDUPTHER

## 2022-01-21 ENCOUNTER — PATIENT OUTREACH (OUTPATIENT)
Dept: ADMINISTRATIVE | Facility: OTHER | Age: 47
End: 2022-01-21
Payer: COMMERCIAL

## 2022-01-21 ENCOUNTER — TELEPHONE (OUTPATIENT)
Dept: PAIN MEDICINE | Facility: CLINIC | Age: 47
End: 2022-01-21
Payer: COMMERCIAL

## 2022-01-21 DIAGNOSIS — Z12.31 ENCOUNTER FOR SCREENING MAMMOGRAM FOR BREAST CANCER: Primary | ICD-10-CM

## 2022-01-21 DIAGNOSIS — Z12.11 ENCOUNTER FOR FIT (FECAL IMMUNOCHEMICAL TEST) SCREENING: ICD-10-CM

## 2022-01-21 NOTE — TELEPHONE ENCOUNTER
Called patient to confirm appointment . Patent Answered and did confirmed.     Glen Cassidy  Medical Assistant

## 2022-01-22 NOTE — PROGRESS NOTES
Health Maintenance Due   Topic Date Due    COVID-19 Vaccine (1) Never done    Colorectal Cancer Screening  09/23/2020    Cervical Cancer Screening  11/29/2021    Mammogram  02/19/2022     Updates were requested from care everywhere.  Chart was reviewed for overdue Proactive [facility] Encounters ([facility]ER[facility]) topics (CRS, Breast Cancer Screening, Eye exam)  Health Maintenance has been updated.  LINKS immunization registry triggered.  Immunizations were reconciled.

## 2022-01-24 ENCOUNTER — OFFICE VISIT (OUTPATIENT)
Dept: PAIN MEDICINE | Facility: CLINIC | Age: 47
End: 2022-01-24
Payer: COMMERCIAL

## 2022-01-24 ENCOUNTER — PATIENT MESSAGE (OUTPATIENT)
Dept: PAIN MEDICINE | Facility: CLINIC | Age: 47
End: 2022-01-24

## 2022-01-24 VITALS
WEIGHT: 193 LBS | BODY MASS INDEX: 28.58 KG/M2 | HEART RATE: 91 BPM | HEIGHT: 69 IN | DIASTOLIC BLOOD PRESSURE: 79 MMHG | SYSTOLIC BLOOD PRESSURE: 120 MMHG | RESPIRATION RATE: 17 BRPM

## 2022-01-24 DIAGNOSIS — G89.29 CHRONIC MYOFASCIAL PAIN: ICD-10-CM

## 2022-01-24 DIAGNOSIS — M79.12 MYALGIA OF AUXILIARY MUSCLES, HEAD AND NECK: ICD-10-CM

## 2022-01-24 DIAGNOSIS — M46.1 SACROILIITIS: Primary | ICD-10-CM

## 2022-01-24 DIAGNOSIS — M79.18 CHRONIC MYOFASCIAL PAIN: ICD-10-CM

## 2022-01-24 PROCEDURE — 1160F PR REVIEW ALL MEDS BY PRESCRIBER/CLIN PHARMACIST DOCUMENTED: ICD-10-PCS | Mod: CPTII,S$GLB,, | Performed by: PHYSICAL MEDICINE & REHABILITATION

## 2022-01-24 PROCEDURE — 3008F BODY MASS INDEX DOCD: CPT | Mod: CPTII,S$GLB,, | Performed by: PHYSICAL MEDICINE & REHABILITATION

## 2022-01-24 PROCEDURE — 99213 PR OFFICE/OUTPT VISIT, EST, LEVL III, 20-29 MIN: ICD-10-PCS | Mod: S$GLB,,, | Performed by: PHYSICAL MEDICINE & REHABILITATION

## 2022-01-24 PROCEDURE — 1159F MED LIST DOCD IN RCRD: CPT | Mod: CPTII,S$GLB,, | Performed by: PHYSICAL MEDICINE & REHABILITATION

## 2022-01-24 PROCEDURE — 3074F SYST BP LT 130 MM HG: CPT | Mod: CPTII,S$GLB,, | Performed by: PHYSICAL MEDICINE & REHABILITATION

## 2022-01-24 PROCEDURE — 1159F PR MEDICATION LIST DOCUMENTED IN MEDICAL RECORD: ICD-10-PCS | Mod: CPTII,S$GLB,, | Performed by: PHYSICAL MEDICINE & REHABILITATION

## 2022-01-24 PROCEDURE — 3078F DIAST BP <80 MM HG: CPT | Mod: CPTII,S$GLB,, | Performed by: PHYSICAL MEDICINE & REHABILITATION

## 2022-01-24 PROCEDURE — 99999 PR PBB SHADOW E&M-EST. PATIENT-LVL IV: CPT | Mod: PBBFAC,,, | Performed by: PHYSICAL MEDICINE & REHABILITATION

## 2022-01-24 PROCEDURE — 3078F PR MOST RECENT DIASTOLIC BLOOD PRESSURE < 80 MM HG: ICD-10-PCS | Mod: CPTII,S$GLB,, | Performed by: PHYSICAL MEDICINE & REHABILITATION

## 2022-01-24 PROCEDURE — 1160F RVW MEDS BY RX/DR IN RCRD: CPT | Mod: CPTII,S$GLB,, | Performed by: PHYSICAL MEDICINE & REHABILITATION

## 2022-01-24 PROCEDURE — 3074F PR MOST RECENT SYSTOLIC BLOOD PRESSURE < 130 MM HG: ICD-10-PCS | Mod: CPTII,S$GLB,, | Performed by: PHYSICAL MEDICINE & REHABILITATION

## 2022-01-24 PROCEDURE — 3008F PR BODY MASS INDEX (BMI) DOCUMENTED: ICD-10-PCS | Mod: CPTII,S$GLB,, | Performed by: PHYSICAL MEDICINE & REHABILITATION

## 2022-01-24 PROCEDURE — 99213 OFFICE O/P EST LOW 20 MIN: CPT | Mod: S$GLB,,, | Performed by: PHYSICAL MEDICINE & REHABILITATION

## 2022-01-24 PROCEDURE — 99999 PR PBB SHADOW E&M-EST. PATIENT-LVL IV: ICD-10-PCS | Mod: PBBFAC,,, | Performed by: PHYSICAL MEDICINE & REHABILITATION

## 2022-01-24 NOTE — PATIENT INSTRUCTIONS
[facility]  Address: [address], [place] [address]  Phone: [phone]      [facility]  Address: [address], [place] [address]  Phone: [phone]

## 2022-01-24 NOTE — PROGRESS NOTES
Established Patient Chronic Pain Note (Follow up visit)    Chief Complaint:   Chief Complaint   Patient presents with    Low-back Pain       SUBJECTIVE:    Venice Orosco is a 47 y.o. female who presents to the clinic for a follow-up appointment for neck and lower back.  She was last seen for procedure on 12/15/2021 where she underwent left sacroiliac joint injection and bilateral cervical myofascial trigger point injections.  She reports that these resulted in 70% relief in the neck, but limited relief in the lower back.  Since the last visit, Venice Orosco states the pain has been mildly improved. Current pain intensity is 5/10.      Patient denies night fever/night sweats, urinary incontinence, bowel incontinence, significant weight loss, significant motor weakness and loss of sensations.     Pain Disability Index Review:  Last 3 PDI Scores 1/24/2022   Pain Disability Index (PDI) 35       Interval HPI 12/03/2021:   Venice Orosco is a 47 y.o. female who presents to the clinic for a follow-up appointment for neck and lower back. Since the last visit, Venice Orosco states the pain has been persistant. Current pain intensity is 8/10.  She locates the neck pain to the base of the cervical spine, mostly left-sided, but also on the right.  She locates lower back pain to the left lumbosacral region.  She denies any radiation of pain into the extremities.    Initial HPI 11/30/2021:  Venice Orosco is a 46 y.o. female with past medical history significant for ADHD, history of gastric banding surgery (2008) who presents to the clinic for the evaluation of longstanding lower back pain.  Today patient reports lower back pain which began years prior without inciting accident, injury or trauma.  Patient recalls pain beginning at the age of 25.  Today patient reports pain which is in a bandlike distribution in her lower back as well as pain along bilateral cervical paraspinous  "muscles.  Patient denies radiculopathy into bilateral upper extremities and denies any radiation into the buttocks or lower extremities.  Patient reports she recalls having "sciatic like" symptoms when she was pregnant, which she is not experiencing currently.       Pain is constant and today is rated as a 3/10.  Pain is described as "dull, stabbing," throbbing and aching in nature.  Pain is exacerbated by physical therapy, truncal flexion, prolonged sitting as well as visits to the chiropractor.  Pain is improved with standing as well as with position changing.  For example when patient is sleeping she must alter her position from side to side.  Patient has trialed hydrocodone without significant relief in her symptoms as well as Zanaflex.  Patient denies any relief from heating pad or ice packs.  Patient denies any significant upper lower extremity radiculopathy, bowel or bladder incontinence or gait imbalance.       Non-Pharmacologic Treatments:  Physical Therapy/Home Exercise: yes  Ice/Heat:yes  TENS: yes  Acupuncture: yes  Massage: yes  Chiropractic: yes    Other: no        Pain Medications:  - Adjuvant Medications: Zanaflex ( Tizanidine) Celebrex, Requip, Relafen         Pain Procedures:   -prior greater trochanteric bursa injections with Rheumatology, 25 yr prior  -12/15/2021:  Left SI joint injection and bilateral cervical myofascial trigger point injections, 70% relief, mostly for the cervical myofascial pain     Imaging:   X-ray cervical spine 11/03/2021:  No listhesis or abnormal motion of the cervical spine with flexion and extension views.  No fracture identified.  Intervertebral disc spaces are maintained.  No bony central canal stenosis.  Prevertebral soft tissues are within normal limits.    X-ray lumbar spine 04/04/16   Vertebral alignment and stature are normal.  Mild degenerative vertebral endplate spurring and facet arthropathy noted at multiple levels with minimal relative narrowing of the L5-S1 " disc space.  Pedicles are intact.  No compression fracture or subluxation.  Opaque device possibly infusion pump noted in the mid left abdomen.     X-ray cervical spine 04/04/16  There is slight reversal of the cervical or doses.  Disc interspaces and vertebral body heights are maintained.  Odontoid is intact.  No fracture or subluxation.      PMHx,PSHx, Social history, and Family history:  I have reviewed the patient's medical, surgical, social, and family history in detail and updated the computerized patient record.    Review of patient's allergies indicates:   Allergen Reactions    Penicillins Rash, Itching and Swelling       Current Outpatient Medications   Medication Sig    azelastine (ASTELIN) 137 mcg (0.1 %) nasal spray 1 spray (137 mcg total) by Nasal route 2 (two) times daily.    celecoxib (CELEBREX) 200 MG capsule Take 1 capsule (200 mg total) by mouth once daily.    fluticasone propionate (FLONASE) 50 mcg/actuation nasal spray 2 sprays (100 mcg total) by Each Nostril route once daily.    [START ON 2/20/2022] lisdexamfetamine (VYVANSE) 70 MG capsule Take 1 capsule (70 mg total) by mouth every morning.    rOPINIRole (REQUIP) 0.5 MG tablet TAKE ONE TABLET BY MOUTH EVERY EVENING    tiZANidine (ZANAFLEX) 4 MG tablet Take 1 tablet (4 mg total) by mouth every 8 (eight) hours.    valACYclovir (VALTREX) 1000 MG tablet Take 1 tablet by mouth once daily.     No current facility-administered medications for this visit.         REVIEW OF SYSTEMS:    GENERAL:  No weight loss, malaise or fevers.  HEENT:   No recent changes in vision or hearing  NECK:  Negative for lumps, no difficulty with swallowing.  RESPIRATORY:  Negative for cough, wheezing or shortness of breath, patient denies any recent URI.  CARDIOVASCULAR:  Negative for chest pain, leg swelling or palpitations.  GI:  Negative for abdominal discomfort, blood in stools or black stools or change in bowel habits.  MUSCULOSKELETAL:  See HPI.  SKIN:   "Negative for lesions, rash, and itching.  PSYCH:  No mood disorder or recent psychosocial stressors.  Patients sleep is not disturbed secondary to pain.  HEMATOLOGY/LYMPHOLOGY:  Negative for prolonged bleeding, bruising easily or swollen nodes.  Patient is not currently taking any anti-coagulants  NEURO:   No history of headaches, syncope, paralysis, seizures or tremors.  All other reviewed and negative other than HPI.    OBJECTIVE:    /79   Pulse 91   Resp 17   Ht 5' 9" (1.753 m)   Wt 87.5 kg (193 lb 0.2 oz)   BMI 28.50 kg/m²     PHYSICAL EXAMINATION:    GENERAL: Well appearing, in no acute distress, alert and oriented x3.  PSYCH:  Mood and affect appropriate.  SKIN: Skin color, texture, turgor normal, no rashes or lesions.  HEAD/FACE:  Normocephalic, atraumatic. Cranial nerves grossly intact.  NECK:  Moderate pain to palpation over the cervical paraspinous muscles, mostly on the right. Spurling Negative to radicular pain.  Mild-to-moderate pain with neck flexion, extension, and lateral flexion.   CV: RRR with palpation of the radial artery.  PULM: No evidence of respiratory difficulty, symmetric chest rise.  GI:  Soft and non-tender.  BACK: Straight leg raising in the sitting and supine positions is negative to radicular pain. No pain to palpation over the facet joints of the lumbar spine or spinous processes. Normal range of motion without pain reproduction.  EXTREMITIES: Peripheral joint ROM is full and pain free without obvious instability or laxity in all four extremities. No deformities, edema, or skin discoloration. Good capillary refill.  MUSCULOSKELETAL: Shoulder, hip, and knee provocative maneuvers are unremarkable.  There is moderate pain with palpation over the sacroiliac joints bilaterally, worse on the left.  FABERs test is positive on the left.  FADIRs test is negative.   Bilateral upper and lower extremity strength is normal and symmetric.  No atrophy or tone abnormalities are " noted.  NEURO: Bilateral upper and lower extremity coordination and muscle stretch reflexes are physiologic and symmetric.  Plantar response are downgoing. No clonus.  No loss of sensation is noted.  GAIT: normal.      LABS:  Lab Results   Component Value Date    WBC 7.24 11/04/2019    HGB 14.1 11/04/2019    HCT 42.9 11/04/2019    MCV 95 11/04/2019     11/04/2019       CMP  Sodium   Date Value Ref Range Status   11/04/2019 139 136 - 145 mmol/L Final     Potassium   Date Value Ref Range Status   11/04/2019 4.7 3.5 - 5.1 mmol/L Final     Chloride   Date Value Ref Range Status   11/04/2019 104 95 - 110 mmol/L Final     CO2   Date Value Ref Range Status   11/04/2019 28 23 - 29 mmol/L Final     Glucose   Date Value Ref Range Status   11/04/2019 79 70 - 110 mg/dL Final     BUN   Date Value Ref Range Status   11/04/2019 11 6 - 20 mg/dL Final     Creatinine   Date Value Ref Range Status   11/04/2019 0.7 0.5 - 1.4 mg/dL Final     Calcium   Date Value Ref Range Status   11/04/2019 9.9 8.7 - 10.5 mg/dL Final     Total Protein   Date Value Ref Range Status   11/04/2019 7.1 6.0 - 8.4 g/dL Final     Albumin   Date Value Ref Range Status   11/04/2019 4.3 3.5 - 5.2 g/dL Final     Total Bilirubin   Date Value Ref Range Status   11/04/2019 0.7 0.1 - 1.0 mg/dL Final     Comment:     For infants and newborns, interpretation of results should be based  on gestational age, weight and in agreement with clinical  observations.  Premature Infant recommended reference ranges:  Up to 24 hours.............<8.0 mg/dL  Up to 48 hours............<12.0 mg/dL  3-5 days..................<15.0 mg/dL  6-29 days.................<15.0 mg/dL       Alkaline Phosphatase   Date Value Ref Range Status   11/04/2019 60 55 - 135 U/L Final     AST   Date Value Ref Range Status   11/04/2019 18 10 - 40 U/L Final     ALT   Date Value Ref Range Status   11/04/2019 17 10 - 44 U/L Final     Anion Gap   Date Value Ref Range Status   11/04/2019 7 (L) 8 - 16  mmol/L Final     eGFR if    Date Value Ref Range Status   11/04/2019 >60.0 >60 mL/min/1.73 m^2 Final     eGFR if non    Date Value Ref Range Status   11/04/2019 >60.0 >60 mL/min/1.73 m^2 Final     Comment:     Calculation used to obtain the estimated glomerular filtration  rate (eGFR) is the CKD-EPI equation.          Lab Results   Component Value Date    HGBA1C 5.1 12/15/2016             ASSESSMENT: 47 y.o. year old female with lower back and neck pain, consistent with     1. Sacroiliitis     2. Myalgia of auxiliary muscles, head and neck     3. Chronic myofascial pain           PLAN:   - Interventions:  None at this time, may consider repeat left SI joint injection the future  - Anticoagulation: no  - Medications: I have stressed the importance of physical activity and a home exercise plan to help with pain and improve health. and Patient can continue with medications for now since they are providing benefits, using them appropriately, and without side effects.  - Therapy:  Advised patient continue with activities as tolerated.  Advised patient she should strongly consider acupuncture to help her with her chronic pain.  - Psychological:  Discussed coping mechanisms to help address chronic pain issues  - Labs:  Reviewed  - Imaging:  Reviewed  - Consults/Referrals:  None at this time  - Records:  Reviewed/Obtain old records from outside physicians and imaging  - Follow up visit: return to clinic in 8-10 weeks or as needed  - Counseled patient regarding the importance of activity modification and physical therapy  - This condition does not require this patient to take time off of work, and the primary goal of our Pain Management services is to improve the patient's functional capacity.  - Patient Questions: Answered all of the patient's questions regarding diagnosis, therapy, and treatment    The above plan and management options were discussed at length with patient. Patient is in  agreement with the above and verbalized understanding.      Bon Valdez MD  Interventional Pain Management  [facility]    Disclaimer:  This note was prepared using voice recognition system and is likely to have sound alike errors that may have been overlooked even after proof reading.  Please call me with any questions

## 2022-03-18 ENCOUNTER — PATIENT MESSAGE (OUTPATIENT)
Dept: ADMINISTRATIVE | Facility: HOSPITAL | Age: 47
End: 2022-03-18
Payer: COMMERCIAL

## 2022-03-18 ENCOUNTER — HOSPITAL ENCOUNTER (OUTPATIENT)
Dept: RADIOLOGY | Facility: HOSPITAL | Age: 47
Discharge: HOME | End: 2022-03-18
Attending: FAMILY MEDICINE
Payer: COMMERCIAL

## 2022-03-18 DIAGNOSIS — Z12.31 ENCOUNTER FOR SCREENING MAMMOGRAM FOR BREAST CANCER: ICD-10-CM

## 2022-03-18 PROCEDURE — 77063 BREAST TOMOSYNTHESIS BI: CPT | Mod: 26,,, | Performed by: RADIOLOGY

## 2022-03-18 PROCEDURE — 77067 SCR MAMMO BI INCL CAD: CPT | Mod: 26,,, | Performed by: RADIOLOGY

## 2022-03-18 PROCEDURE — 77063 MAMMO DIGITAL SCREENING BILAT WITH TOMO: ICD-10-PCS | Mod: 26,,, | Performed by: RADIOLOGY

## 2022-03-18 PROCEDURE — 77067 MAMMO DIGITAL SCREENING BILAT WITH TOMO: ICD-10-PCS | Mod: 26,,, | Performed by: RADIOLOGY

## 2022-03-18 PROCEDURE — 77063 BREAST TOMOSYNTHESIS BI: CPT | Mod: TC

## 2022-03-22 ENCOUNTER — OFFICE VISIT (OUTPATIENT)
Dept: FAMILY MEDICINE | Facility: CLINIC | Age: 47
End: 2022-03-22
Payer: COMMERCIAL

## 2022-03-22 DIAGNOSIS — F90.9 ADULT ADHD: Primary | ICD-10-CM

## 2022-03-22 PROCEDURE — 99213 PR OFFICE/OUTPT VISIT, EST, LEVL III, 20-29 MIN: ICD-10-PCS | Mod: 95,,, | Performed by: FAMILY MEDICINE

## 2022-03-22 PROCEDURE — 99213 OFFICE O/P EST LOW 20 MIN: CPT | Mod: 95,,, | Performed by: FAMILY MEDICINE

## 2022-03-22 RX ORDER — LISDEXAMFETAMINE DIMESYLATE 70 MG/1
70 CAPSULE ORAL EVERY MORNING
Qty: 30 CAPSULE | Refills: 0 | Status: SHIPPED | OUTPATIENT
Start: 2022-04-22 | End: 2022-06-23 | Stop reason: SDUPTHER

## 2022-03-22 RX ORDER — LISDEXAMFETAMINE DIMESYLATE 70 MG/1
70 CAPSULE ORAL EVERY MORNING
Qty: 30 CAPSULE | Refills: 0 | Status: SHIPPED | OUTPATIENT
Start: 2022-05-22 | End: 2022-06-23 | Stop reason: SDUPTHER

## 2022-03-22 RX ORDER — LISDEXAMFETAMINE DIMESYLATE 70 MG/1
70 CAPSULE ORAL EVERY MORNING
Qty: 30 CAPSULE | Refills: 0 | Status: SHIPPED | OUTPATIENT
Start: 2022-03-22 | End: 2022-06-21

## 2022-03-22 NOTE — PROGRESS NOTES
Chief Complaint:  No chief complaint on file.      History of Present Illness:  Patient with a hx of DDD, adult ADHD, and sacroilitis presents today for a virtual visit      ADHD medication refill working well no side effects.  No headache chest pain palpitations  Wants to want to do colonoscopy later in the year when she meets her deductible.   Had pap smear in 2022.     ROS:  Review of Systems   Constitutional: Negative for activity change, appetite change, chills, fever and unexpected weight change.   HENT: Negative for congestion, ear pain, hearing loss, postnasal drip, rhinorrhea, sinus pressure, sinus pain and trouble swallowing.    Eyes: Negative for pain, discharge and visual disturbance.   Respiratory: Negative for cough, chest tightness, shortness of breath and wheezing.    Cardiovascular: Negative for chest pain and palpitations.   Gastrointestinal: Negative for abdominal pain, blood in stool, constipation, diarrhea, nausea and vomiting.   Endocrine: Negative for polydipsia and polyuria.   Genitourinary: Negative for difficulty urinating, dysuria, flank pain, hematuria and menstrual problem.   Musculoskeletal: Negative for arthralgias, joint swelling, myalgias and neck pain.   Skin: Negative for pallor and wound.   Neurological: Negative for dizziness, tremors, speech difficulty, weakness, light-headedness and headaches.   Psychiatric/Behavioral: Negative for behavioral problems, confusion, dysphoric mood and sleep disturbance. The patient is not nervous/anxious.    All other systems reviewed and are negative.      Past Medical History:   Diagnosis Date    DDD (degenerative disc disease), lumbar     Fatty liver     Nicotine abuse        Social History:  Social History     Socioeconomic History    Marital status:    Occupational History     Employer: homemaker   Tobacco Use    Smoking status: Former Smoker     Types: Cigarettes     Quit date: 10/7/2015     Years since quittin.4     Smokeless tobacco: Never Used   Substance and Sexual Activity    Alcohol use: Yes     Alcohol/week: 0.0 standard drinks     Comment: occasionally     Drug use: No    Sexual activity: Yes     Partners: Male     Birth control/protection: None     Social Determinants of Health     Financial Resource Strain: Unknown    Difficulty of Paying Living Expenses: Patient refused   Food Insecurity: Unknown    Worried About Running Out of Food in the Last Year: Patient refused    Ran Out of Food in the Last Year: Patient refused   Transportation Needs: No Transportation Needs    Lack of Transportation (Medical): No    Lack of Transportation (Non-Medical): No   Physical Activity: Unknown    Days of Exercise per Week: Patient refused   Stress: No Stress Concern Present    Feeling of Stress : Only a little   Social Connections: Unknown    Frequency of Communication with Friends and Family: More than three times a week    Frequency of Social Gatherings with Friends and Family: More than three times a week    Active Member of Clubs or Organizations: No    Marital Status:    Housing Stability: Unknown    Unable to Pay for Housing in the Last Year: Patient refused    Unstable Housing in the Last Year: Patient refused       Family History:   family history includes Breast cancer in her maternal grandmother; Breast cancer (age of onset: 50) in her mother; Cancer in her maternal grandmother; Diabetes in her cousin; Heart attack in her maternal grandmother; Hypertension in her maternal grandmother.    Health Maintenance   Topic Date Due    Lipid Panel  11/06/2022    TETANUS VACCINE  03/15/2023    Mammogram  03/18/2023    Hepatitis C Screening  Completed       Physical Exam:     ]    There is no height or weight on file to calculate BMI.    Physical Exam      Assessment:      ICD-10-CM ICD-9-CM   1. Adult ADHD  F90.9 314.01         Plan:          checked 3 prescriptions for Vyvanse sent to the  pharmacy.  Retrieve records of pap smear.   Will order colonoscopy when patient is ready.   Follow up  The patient location is: Home   The chief complaint leading to consultation is: as below  Visit type: Virtual visit with synchronous audio and video    Each patient to whom he or she provides medical services by telemedicine is:  (1) informed of the relationship between the physician and patient and the respective role of any other health care provider with respect to management of the patient; and (2) notified that he or she may decline to receive medical services by telemedicine and may withdraw from such care at any time.    Notes: see below        No orders of the defined types were placed in this encounter.      Current Outpatient Medications   Medication Sig Dispense Refill    azelastine (ASTELIN) 137 mcg (0.1 %) nasal spray 1 spray (137 mcg total) by Nasal route 2 (two) times daily. 30 mL 0    celecoxib (CELEBREX) 200 MG capsule Take 1 capsule (200 mg total) by mouth once daily. 30 capsule 0    fluticasone propionate (FLONASE) 50 mcg/actuation nasal spray 2 sprays (100 mcg total) by Each Nostril route once daily. 16 g 11    lisdexamfetamine (VYVANSE) 70 MG capsule Take 1 capsule (70 mg total) by mouth every morning. 30 capsule 0    [START ON 4/22/2022] lisdexamfetamine (VYVANSE) 70 MG capsule Take 1 capsule (70 mg total) by mouth every morning. 30 capsule 0    [START ON 5/22/2022] lisdexamfetamine (VYVANSE) 70 MG capsule Take 1 capsule (70 mg total) by mouth every morning. 30 capsule 0    rOPINIRole (REQUIP) 0.5 MG tablet TAKE ONE TABLET BY MOUTH EVERY EVENING 30 tablet 11    tiZANidine (ZANAFLEX) 4 MG tablet Take 1 tablet (4 mg total) by mouth every 8 (eight) hours. 30 tablet 1    valACYclovir (VALTREX) 1000 MG tablet Take 1 tablet by mouth once daily.       No current facility-administered medications for this visit.       Medications Discontinued During This Encounter   Medication Reason     lisdexamfetamine (VYVANSE) 70 MG capsule Reorder       No follow-ups on file.      Jennifer Summers MD  Scribe Attestation:   I, Katie Arevalo, am scribing for, and in the presence of, Dr.Arif Summers I performed the above scribed service and the documentation accurately describes the services I performed. I attest to the accuracy of the note.    I, Dr. Jennifer Summers, reviewed documentation as scribed above. I performed the services described in this documentation.  I agree that the record reflects my personal performance and is accurate and complete. Jennifer Summers MD.  10/04/2021

## 2022-06-23 ENCOUNTER — OFFICE VISIT (OUTPATIENT)
Dept: FAMILY MEDICINE | Facility: CLINIC | Age: 47
End: 2022-06-23
Payer: COMMERCIAL

## 2022-06-23 VITALS
HEART RATE: 87 BPM | DIASTOLIC BLOOD PRESSURE: 72 MMHG | BODY MASS INDEX: 27.88 KG/M2 | WEIGHT: 188.25 LBS | HEIGHT: 69 IN | SYSTOLIC BLOOD PRESSURE: 130 MMHG | OXYGEN SATURATION: 99 % | TEMPERATURE: 98 F

## 2022-06-23 DIAGNOSIS — F90.9 ADULT ADHD: Primary | ICD-10-CM

## 2022-06-23 LAB
AMPHET+METHAMPHET UR QL: ABNORMAL
BARBITURATES UR QL SCN>200 NG/ML: NEGATIVE
BENZODIAZ UR QL SCN>200 NG/ML: NEGATIVE
BZE UR QL SCN: NEGATIVE
CANNABINOIDS UR QL SCN: NEGATIVE
CREAT UR-MCNC: 30 MG/DL (ref 15–325)
ETHANOL UR-MCNC: <10 MG/DL
METHADONE UR QL SCN>300 NG/ML: NEGATIVE
OPIATES UR QL SCN: NEGATIVE
PCP UR QL SCN>25 NG/ML: NEGATIVE
TOXICOLOGY INFORMATION: ABNORMAL

## 2022-06-23 PROCEDURE — 3075F PR MOST RECENT SYSTOLIC BLOOD PRESS GE 130-139MM HG: ICD-10-PCS | Mod: CPTII,S$GLB,, | Performed by: FAMILY MEDICINE

## 2022-06-23 PROCEDURE — 3008F BODY MASS INDEX DOCD: CPT | Mod: CPTII,S$GLB,, | Performed by: FAMILY MEDICINE

## 2022-06-23 PROCEDURE — 80307 DRUG TEST PRSMV CHEM ANLYZR: CPT | Performed by: FAMILY MEDICINE

## 2022-06-23 PROCEDURE — 99213 PR OFFICE/OUTPT VISIT, EST, LEVL III, 20-29 MIN: ICD-10-PCS | Mod: S$GLB,,, | Performed by: FAMILY MEDICINE

## 2022-06-23 PROCEDURE — 99213 OFFICE O/P EST LOW 20 MIN: CPT | Mod: S$GLB,,, | Performed by: FAMILY MEDICINE

## 2022-06-23 PROCEDURE — 3078F DIAST BP <80 MM HG: CPT | Mod: CPTII,S$GLB,, | Performed by: FAMILY MEDICINE

## 2022-06-23 PROCEDURE — 3078F PR MOST RECENT DIASTOLIC BLOOD PRESSURE < 80 MM HG: ICD-10-PCS | Mod: CPTII,S$GLB,, | Performed by: FAMILY MEDICINE

## 2022-06-23 PROCEDURE — 3008F PR BODY MASS INDEX (BMI) DOCUMENTED: ICD-10-PCS | Mod: CPTII,S$GLB,, | Performed by: FAMILY MEDICINE

## 2022-06-23 PROCEDURE — 3075F SYST BP GE 130 - 139MM HG: CPT | Mod: CPTII,S$GLB,, | Performed by: FAMILY MEDICINE

## 2022-06-23 PROCEDURE — 1160F RVW MEDS BY RX/DR IN RCRD: CPT | Mod: CPTII,S$GLB,, | Performed by: FAMILY MEDICINE

## 2022-06-23 PROCEDURE — 1159F MED LIST DOCD IN RCRD: CPT | Mod: CPTII,S$GLB,, | Performed by: FAMILY MEDICINE

## 2022-06-23 PROCEDURE — 99999 PR PBB SHADOW E&M-EST. PATIENT-LVL IV: ICD-10-PCS | Mod: PBBFAC,,, | Performed by: FAMILY MEDICINE

## 2022-06-23 PROCEDURE — 1160F PR REVIEW ALL MEDS BY PRESCRIBER/CLIN PHARMACIST DOCUMENTED: ICD-10-PCS | Mod: CPTII,S$GLB,, | Performed by: FAMILY MEDICINE

## 2022-06-23 PROCEDURE — 99999 PR PBB SHADOW E&M-EST. PATIENT-LVL IV: CPT | Mod: PBBFAC,,, | Performed by: FAMILY MEDICINE

## 2022-06-23 PROCEDURE — 1159F PR MEDICATION LIST DOCUMENTED IN MEDICAL RECORD: ICD-10-PCS | Mod: CPTII,S$GLB,, | Performed by: FAMILY MEDICINE

## 2022-06-23 RX ORDER — LISDEXAMFETAMINE DIMESYLATE 70 MG/1
70 CAPSULE ORAL EVERY MORNING
Qty: 30 CAPSULE | Refills: 0 | Status: SHIPPED | OUTPATIENT
Start: 2022-08-23 | End: 2022-11-10 | Stop reason: SDUPTHER

## 2022-06-23 RX ORDER — LISDEXAMFETAMINE DIMESYLATE 70 MG/1
70 CAPSULE ORAL EVERY MORNING
Qty: 30 CAPSULE | Refills: 0 | Status: SHIPPED | OUTPATIENT
Start: 2022-07-23 | End: 2022-11-10 | Stop reason: SDUPTHER

## 2022-06-23 RX ORDER — LISDEXAMFETAMINE DIMESYLATE 70 MG/1
70 CAPSULE ORAL EVERY MORNING
Qty: 30 CAPSULE | Refills: 0 | Status: SHIPPED | OUTPATIENT
Start: 2022-06-23 | End: 2022-10-11

## 2022-06-23 NOTE — PROGRESS NOTES
Chief Complaint:    Chief Complaint   Patient presents with    Medication Refill       History of Present Illness:  Patient with DDD, adult ADHD, and sacroilitis presents today for a medication refill.     Gets nosebleeds after taking advil.   Will wait to do labs when she meets her deductible.     ADHD medication refill working well no side effects.  No headache chest pain palpitations  Wants to want to do colonoscopy later in the year when she meets her deductible.   Had pap smear in 01/2022.     ROS:  Review of Systems   Constitutional: Negative for activity change, appetite change, chills, fever and unexpected weight change.   HENT: Positive for nosebleeds. Negative for congestion, ear pain, hearing loss, postnasal drip, rhinorrhea, sinus pressure, sinus pain and trouble swallowing.    Eyes: Negative for pain, discharge and visual disturbance.   Respiratory: Negative for cough, chest tightness, shortness of breath and wheezing.    Cardiovascular: Negative for chest pain and palpitations.   Gastrointestinal: Negative for abdominal pain, blood in stool, constipation, diarrhea, nausea and vomiting.   Endocrine: Negative for polydipsia and polyuria.   Genitourinary: Negative for difficulty urinating, dysuria, flank pain, hematuria and menstrual problem.   Musculoskeletal: Negative for arthralgias, joint swelling, myalgias and neck pain.   Skin: Negative for pallor and wound.   Neurological: Negative for dizziness, tremors, speech difficulty, weakness, light-headedness and headaches.   Psychiatric/Behavioral: Negative for behavioral problems, confusion, dysphoric mood and sleep disturbance. The patient is not nervous/anxious.    All other systems reviewed and are negative.      Past Medical History:   Diagnosis Date    DDD (degenerative disc disease), lumbar     Fatty liver     Nicotine abuse        Social History:  Social History     Socioeconomic History    Marital status:    Occupational History      Employer: homemaker   Tobacco Use    Smoking status: Former Smoker     Types: Cigarettes     Quit date: 10/7/2015     Years since quittin.7    Smokeless tobacco: Never Used   Substance and Sexual Activity    Alcohol use: Yes     Alcohol/week: 0.0 standard drinks     Comment: occasionally     Drug use: No    Sexual activity: Yes     Partners: Male     Birth control/protection: None     Social Determinants of Health     Financial Resource Strain: Unknown    Difficulty of Paying Living Expenses: Patient refused   Food Insecurity: Unknown    Worried About Running Out of Food in the Last Year: Patient refused    Ran Out of Food in the Last Year: Patient refused   Transportation Needs: No Transportation Needs    Lack of Transportation (Medical): No    Lack of Transportation (Non-Medical): No   Physical Activity: Unknown    Days of Exercise per Week: Patient refused   Stress: No Stress Concern Present    Feeling of Stress : Only a little   Social Connections: Unknown    Frequency of Communication with Friends and Family: More than three times a week    Frequency of Social Gatherings with Friends and Family: More than three times a week    Active Member of Clubs or Organizations: No    Marital Status:    Housing Stability: Unknown    Unable to Pay for Housing in the Last Year: Patient refused    Unstable Housing in the Last Year: Patient refused       Family History:   family history includes Breast cancer in her maternal grandmother; Breast cancer (age of onset: 50) in her mother; Cancer in her maternal grandmother; Diabetes in her cousin; Heart attack in her maternal grandmother; Hypertension in her maternal grandmother.    Health Maintenance   Topic Date Due    Lipid Panel  2022    TETANUS VACCINE  03/15/2023    Mammogram  2023    Hepatitis C Screening  Completed       Physical Exam:    Vital Signs  Temp: 97.8 °F (36.6 °C)  Pulse: 87  SpO2: 99 %  BP: 130/72  Pain Score: 0-No  "pain  Height and Weight  Height: 5' 9" (175.3 cm)  Weight: 85.4 kg (188 lb 4.4 oz)  BSA (Calculated - sq m): 2.04 sq meters  BMI (Calculated): 27.8  Weight in (lb) to have BMI = 25: 168.9]    Body mass index is 27.8 kg/m².    Physical Exam  Vitals and nursing note reviewed.   Constitutional:       Appearance: Normal appearance. She is not toxic-appearing.   HENT:      Head: Normocephalic and atraumatic.      Right Ear: Tympanic membrane normal.      Left Ear: Tympanic membrane normal.   Eyes:      Extraocular Movements: Extraocular movements intact.      Pupils: Pupils are equal, round, and reactive to light.   Cardiovascular:      Rate and Rhythm: Normal rate and regular rhythm.      Heart sounds: Normal heart sounds.   Pulmonary:      Effort: Pulmonary effort is normal.      Breath sounds: Normal breath sounds. No wheezing, rhonchi or rales.   Abdominal:      General: Bowel sounds are normal. There is no distension.      Palpations: Abdomen is soft.      Tenderness: There is no abdominal tenderness.   Musculoskeletal:         General: Normal range of motion.      Cervical back: Normal range of motion.   Skin:     General: Skin is warm and dry.      Capillary Refill: Capillary refill takes less than 2 seconds.   Neurological:      General: No focal deficit present.      Mental Status: She is alert and oriented to person, place, and time.   Psychiatric:         Mood and Affect: Mood normal.         Behavior: Behavior normal.         Judgment: Judgment normal.           Assessment:      ICD-10-CM ICD-9-CM   1. Adult ADHD  F90.9 314.01       Plan:     Explained to patient that NSAIDs can cause nosebleeds.    checked 3 prescriptions for Vyvanse sent to the pharmacy.  Will order colonoscopy when patient is ready.   See labs below.   Follow up  Schedule one in-person visit for every two virtual visits.   Orders Placed This Encounter   Procedures    Toxicology screen, urine       Current Outpatient Medications "   Medication Sig Dispense Refill    azelastine (ASTELIN) 137 mcg (0.1 %) nasal spray 1 spray (137 mcg total) by Nasal route 2 (two) times daily. 30 mL 0    celecoxib (CELEBREX) 200 MG capsule Take 1 capsule (200 mg total) by mouth once daily. 30 capsule 0    fluticasone propionate (FLONASE) 50 mcg/actuation nasal spray 2 sprays (100 mcg total) by Each Nostril route once daily. 16 g 11    rOPINIRole (REQUIP) 0.5 MG tablet TAKE ONE TABLET BY MOUTH EVERY EVENING 30 tablet 11    tiZANidine (ZANAFLEX) 4 MG tablet Take 1 tablet (4 mg total) by mouth every 8 (eight) hours. 30 tablet 1    valACYclovir (VALTREX) 1000 MG tablet Take 1 tablet by mouth once daily.      [START ON 8/23/2022] lisdexamfetamine (VYVANSE) 70 MG capsule Take 1 capsule (70 mg total) by mouth every morning. 30 capsule 0    [START ON 7/23/2022] lisdexamfetamine (VYVANSE) 70 MG capsule Take 1 capsule (70 mg total) by mouth every morning. 30 capsule 0    lisdexamfetamine (VYVANSE) 70 MG capsule Take 1 capsule (70 mg total) by mouth every morning. 30 capsule 0     No current facility-administered medications for this visit.       Medications Discontinued During This Encounter   Medication Reason    lisdexamfetamine (VYVANSE) 70 MG capsule Reorder    lisdexamfetamine (VYVANSE) 70 MG capsule Reorder    VYVANSE 70 mg capsule Reorder       No follow-ups on file.      Jennifer Summers MD  Scribe Attestation:   I, Katie Arevalo, am scribing for, and in the presence of, Dr.Arif Summers I performed the above scribed service and the documentation accurately describes the services I performed. I attest to the accuracy of the note.    I, Dr. Jennifer Summers, reviewed documentation as scribed above. I performed the services described in this documentation.  I agree that the record reflects my personal performance and is accurate and complete. Jennifer Summers MD.  06/23/2022

## 2022-07-13 NOTE — TELEPHONE ENCOUNTER
----- Message from Ellie Dejesus sent at 11/7/2019  3:59 PM CST -----  Contact: pt  .Type:  Mammogram    Caller is requesting to schedule their annual mammogram appointment.  Order is not listed in EPIC.  Please enter order and contact patient to schedule.  Name of Caller:pt  Where would they like the mammogram performed?[facility]  Would the patient rather a call back or a response via [facility]? Call back  Best Call Back Number:[phone]  Additional Information:      Upon review of the In Basket request and the patient's chart, initial outreach has been made via fax, please see Contacts section for details       Thank you  Lucy Santos, [address] [phone] Fax [phone]

## 2022-11-08 ENCOUNTER — PATIENT MESSAGE (OUTPATIENT)
Dept: ADMINISTRATIVE | Facility: HOSPITAL | Age: 47
End: 2022-11-08
Payer: COMMERCIAL

## 2022-11-08 DIAGNOSIS — Z12.11 SCREENING FOR COLON CANCER: ICD-10-CM

## 2022-11-10 ENCOUNTER — OFFICE VISIT (OUTPATIENT)
Dept: FAMILY MEDICINE | Facility: CLINIC | Age: 47
End: 2022-11-10
Payer: COMMERCIAL

## 2022-11-10 VITALS
DIASTOLIC BLOOD PRESSURE: 84 MMHG | TEMPERATURE: 98 F | SYSTOLIC BLOOD PRESSURE: 110 MMHG | HEART RATE: 89 BPM | WEIGHT: 199.06 LBS | HEIGHT: 69 IN | BODY MASS INDEX: 29.48 KG/M2 | OXYGEN SATURATION: 100 %

## 2022-11-10 DIAGNOSIS — Z00.00 WELL ADULT EXAM: Primary | ICD-10-CM

## 2022-11-10 DIAGNOSIS — F90.9 ADULT ADHD: ICD-10-CM

## 2022-11-10 DIAGNOSIS — K59.09 CHRONIC CONSTIPATION: ICD-10-CM

## 2022-11-10 DIAGNOSIS — Z12.11 COLON CANCER SCREENING: ICD-10-CM

## 2022-11-10 PROCEDURE — 3008F BODY MASS INDEX DOCD: CPT | Mod: CPTII,S$GLB,, | Performed by: FAMILY MEDICINE

## 2022-11-10 PROCEDURE — 1159F MED LIST DOCD IN RCRD: CPT | Mod: CPTII,S$GLB,, | Performed by: FAMILY MEDICINE

## 2022-11-10 PROCEDURE — 99396 PREV VISIT EST AGE 40-64: CPT | Mod: S$GLB,,, | Performed by: FAMILY MEDICINE

## 2022-11-10 PROCEDURE — 1159F PR MEDICATION LIST DOCUMENTED IN MEDICAL RECORD: ICD-10-PCS | Mod: CPTII,S$GLB,, | Performed by: FAMILY MEDICINE

## 2022-11-10 PROCEDURE — 3008F PR BODY MASS INDEX (BMI) DOCUMENTED: ICD-10-PCS | Mod: CPTII,S$GLB,, | Performed by: FAMILY MEDICINE

## 2022-11-10 PROCEDURE — 3079F PR MOST RECENT DIASTOLIC BLOOD PRESSURE 80-89 MM HG: ICD-10-PCS | Mod: CPTII,S$GLB,, | Performed by: FAMILY MEDICINE

## 2022-11-10 PROCEDURE — 99999 PR PBB SHADOW E&M-EST. PATIENT-LVL III: CPT | Mod: PBBFAC,,, | Performed by: FAMILY MEDICINE

## 2022-11-10 PROCEDURE — 99999 PR PBB SHADOW E&M-EST. PATIENT-LVL III: ICD-10-PCS | Mod: PBBFAC,,, | Performed by: FAMILY MEDICINE

## 2022-11-10 PROCEDURE — 99396 PR PREVENTIVE VISIT,EST,40-64: ICD-10-PCS | Mod: S$GLB,,, | Performed by: FAMILY MEDICINE

## 2022-11-10 PROCEDURE — 3074F SYST BP LT 130 MM HG: CPT | Mod: CPTII,S$GLB,, | Performed by: FAMILY MEDICINE

## 2022-11-10 PROCEDURE — 3074F PR MOST RECENT SYSTOLIC BLOOD PRESSURE < 130 MM HG: ICD-10-PCS | Mod: CPTII,S$GLB,, | Performed by: FAMILY MEDICINE

## 2022-11-10 PROCEDURE — 3079F DIAST BP 80-89 MM HG: CPT | Mod: CPTII,S$GLB,, | Performed by: FAMILY MEDICINE

## 2022-11-10 RX ORDER — LISDEXAMFETAMINE DIMESYLATE 70 MG/1
70 CAPSULE ORAL EVERY MORNING
Qty: 30 CAPSULE | Refills: 0 | Status: SHIPPED | OUTPATIENT
Start: 2022-11-10 | End: 2023-01-26 | Stop reason: SDUPTHER

## 2022-11-10 RX ORDER — LISDEXAMFETAMINE DIMESYLATE 70 MG/1
70 CAPSULE ORAL EVERY MORNING
Qty: 30 CAPSULE | Refills: 0 | Status: SHIPPED | OUTPATIENT
Start: 2023-01-10 | End: 2023-01-26 | Stop reason: SDUPTHER

## 2022-11-10 RX ORDER — LISDEXAMFETAMINE DIMESYLATE 70 MG/1
70 CAPSULE ORAL EVERY MORNING
Qty: 30 CAPSULE | Refills: 0 | Status: SHIPPED | OUTPATIENT
Start: 2022-12-10 | End: 2023-01-26 | Stop reason: SDUPTHER

## 2022-11-10 NOTE — PROGRESS NOTES
Chief Complaint:    Chief Complaint   Patient presents with    Annual Exam       History of Present Illness:  Patient with DDD, adult ADHD, and sacroilitis presents today for an annual follow up.     Completed labs at her OB. Will upload them on [facility].   She ordered a fit kit on [facility] but would rather complete a Cologuard test. She says she has a family history of cancer but is unsure what types her family members  of.   Former smoker. ~20 year history of up to 1 ppd.   Patient reports pain all over, most sensitive in her neck right now. She has gone to physical therapy, pain management and does home exercises.   Deals with chronic constipation. No relief with lactulose. She is currently doing hydrotherapy. Her bowel movements occur every 2-3 days and they are painful; she has to take a stool softener. She drinks water and eats cheese, fruit, vegetables, salads. Patient has backed off cheese. Her diet changed after her lap band procedure.         ROS:  Review of Systems   Constitutional:  Negative for activity change, appetite change, chills, fever and unexpected weight change.   HENT:  Negative for congestion, ear pain, hearing loss, nosebleeds, postnasal drip, rhinorrhea, sinus pressure, sinus pain and trouble swallowing.    Eyes:  Negative for pain, discharge and visual disturbance.   Respiratory:  Negative for cough, chest tightness, shortness of breath and wheezing.    Cardiovascular:  Negative for chest pain and palpitations.   Gastrointestinal:  Positive for constipation. Negative for abdominal pain, blood in stool, diarrhea, nausea and vomiting.   Endocrine: Negative for polydipsia and polyuria.   Genitourinary:  Negative for difficulty urinating, dysuria, flank pain, hematuria and menstrual problem.   Musculoskeletal:  Positive for arthralgias, myalgias and neck pain. Negative for joint swelling.   Skin:  Negative for pallor and wound.   Neurological:  Negative for dizziness, tremors, speech  difficulty, weakness, light-headedness and headaches.   Psychiatric/Behavioral:  Negative for behavioral problems, confusion, dysphoric mood and sleep disturbance. The patient is not nervous/anxious.    All other systems reviewed and are negative.    Past Medical History:   Diagnosis Date    DDD (degenerative disc disease), lumbar     Fatty liver     Nicotine abuse        Social History:  Social History     Socioeconomic History    Marital status:    Occupational History     Employer: homemaker   Tobacco Use    Smoking status: Former     Types: Cigarettes     Quit date: 10/7/2015     Years since quittin.0    Smokeless tobacco: Never   Substance and Sexual Activity    Alcohol use: Yes     Alcohol/week: 0.0 standard drinks     Comment: occasionally     Drug use: No    Sexual activity: Yes     Partners: Male     Birth control/protection: None     Social Determinants of Health     Financial Resource Strain: Unknown    Difficulty of Paying Living Expenses: Patient refused   Food Insecurity: Unknown    Worried About Running Out of Food in the Last Year: Patient refused    Ran Out of Food in the Last Year: Patient refused   Transportation Needs: Unknown    Lack of Transportation (Medical): Patient refused    Lack of Transportation (Non-Medical): Patient refused   Physical Activity: Insufficiently Active    Days of Exercise per Week: 2 days    Minutes of Exercise per Session: 10 min   Stress: No Stress Concern Present    Feeling of Stress : Not at all   Social Connections: Unknown    Frequency of Communication with Friends and Family: Patient refused    Frequency of Social Gatherings with Friends and Family: Patient refused    Active Member of Clubs or Organizations: Patient refused    Attends Club or Organization Meetings: Patient refused    Marital Status:    Housing Stability: Unknown    Unable to Pay for Housing in the Last Year: Patient refused    Unstable Housing in the Last Year: Patient refused  "      Family History:   family history includes Breast cancer in her maternal grandmother; Breast cancer (age of onset: 50) in her mother; Cancer in her maternal grandmother; Diabetes in her cousin; Heart attack in her maternal grandmother; Hypertension in her maternal grandmother.    Health Maintenance   Topic Date Due    Lipid Panel  11/06/2022    TETANUS VACCINE  03/15/2023    Mammogram  03/18/2023    Hepatitis C Screening  Completed       Physical Exam:    Vital Signs  Temp: 97.5 °F (36.4 °C)  Temp src: Temporal  Pulse: 89  SpO2: 100 %  BP: 110/84  BP Location: Left arm  Patient Position: Sitting  Height and Weight  Height: 5' 9" (175.3 cm)  Weight: 90.3 kg (199 lb 1.2 oz)  BSA (Calculated - sq m): 2.1 sq meters  BMI (Calculated): 29.4  Weight in (lb) to have BMI = 25: 168.9]    Body mass index is 29.4 kg/m².    Physical Exam  Vitals and nursing note reviewed.   Constitutional:       Appearance: Normal appearance. She is not toxic-appearing.   HENT:      Head: Normocephalic and atraumatic.      Right Ear: Tympanic membrane normal.      Left Ear: Tympanic membrane normal.   Eyes:      Extraocular Movements: Extraocular movements intact.      Pupils: Pupils are equal, round, and reactive to light.   Neck:     Cardiovascular:      Rate and Rhythm: Normal rate and regular rhythm.      Heart sounds: Normal heart sounds.   Pulmonary:      Effort: Pulmonary effort is normal.      Breath sounds: Normal breath sounds. No wheezing, rhonchi or rales.   Abdominal:      General: Bowel sounds are normal. There is no distension.      Palpations: Abdomen is soft.      Tenderness: There is no abdominal tenderness.   Musculoskeletal:         General: Normal range of motion.      Cervical back: Normal range of motion. Muscular tenderness present.   Skin:     General: Skin is warm and dry.      Capillary Refill: Capillary refill takes less than 2 seconds.   Neurological:      General: No focal deficit present.      Mental Status: " She is alert and oriented to person, place, and time.   Psychiatric:         Mood and Affect: Mood normal.         Behavior: Behavior normal.         Judgment: Judgment normal.         Assessment:      ICD-10-CM ICD-9-CM   1. Well adult exam  Z00.00 V70.0   2. Adult ADHD  F90.9 314.01   3. Colon cancer screening  Z12.11 V76.51   4. Chronic constipation  K59.09 564.00     Plan:      checked 3 prescriptions for Vyvanse sent to the pharmacy.  Discussed options to relieve her chronic constipation. She should cut back on cheese.  Lactulose did not work, over-the-counter meds only work partly  Recommend acupuncture for chronic upper neck pain  Complete Cologuard kit for cancer screening.   See labs below.   Schedule one in-person visit for every two virtual visits.   Orders Placed This Encounter   Procedures    Cologuard Screening (Multitarget Stool DNA)     Current Outpatient Medications   Medication Sig Dispense Refill    azelastine (ASTELIN) 137 mcg (0.1 %) nasal spray 1 spray (137 mcg total) by Nasal route 2 (two) times daily. 30 mL 0    celecoxib (CELEBREX) 200 MG capsule Take 1 capsule (200 mg total) by mouth once daily. 30 capsule 0    fluticasone propionate (FLONASE) 50 mcg/actuation nasal spray 2 sprays (100 mcg total) by Each Nostril route once daily. 16 g 11    rOPINIRole (REQUIP) 0.5 MG tablet TAKE ONE TABLET BY MOUTH EVERY EVENING 30 tablet 11    tiZANidine (ZANAFLEX) 4 MG tablet TAKE ONE TABLET BY MOUTH EVERY 8 HOURS 30 tablet 1    valACYclovir (VALTREX) 1000 MG tablet Take 1 tablet by mouth once daily.      VYVANSE 70 mg capsule Take 1 capsule (70 mg total) by mouth every morning. 30 capsule 0    [START ON 12/10/2022] lisdexamfetamine (VYVANSE) 70 MG capsule Take 1 capsule (70 mg total) by mouth every morning. 30 capsule 0    lisdexamfetamine (VYVANSE) 70 MG capsule Take 1 capsule (70 mg total) by mouth every morning. 30 capsule 0    [START ON 1/10/2023] lisdexamfetamine (VYVANSE) 70 MG capsule Take 1  capsule (70 mg total) by mouth every morning. 30 capsule 0     No current facility-administered medications for this visit.       Medications Discontinued During This Encounter   Medication Reason    lisdexamfetamine (VYVANSE) 70 MG capsule Reorder    lisdexamfetamine (VYVANSE) 70 MG capsule Reorder         No follow-ups on file.      Jennifer Summers MD  Scribe Attestation:   I, Katie Arevalo, am scribing for, and in the presence of, Dr.Arif Summers I performed the above scribed service and the documentation accurately describes the services I performed. I attest to the accuracy of the note.    I, Dr. Jennifer Summers, reviewed documentation as scribed above. I performed the services described in this documentation.  I agree that the record reflects my personal performance and is accurate and complete. Jennifer Summers MD.  11/10/2022

## 2022-11-15 ENCOUNTER — PATIENT MESSAGE (OUTPATIENT)
Dept: FAMILY MEDICINE | Facility: CLINIC | Age: 47
End: 2022-11-15
Payer: COMMERCIAL

## 2023-01-25 ENCOUNTER — TELEPHONE (OUTPATIENT)
Dept: FAMILY MEDICINE | Facility: CLINIC | Age: 48
End: 2023-01-25
Payer: COMMERCIAL

## 2023-01-25 ENCOUNTER — PATIENT MESSAGE (OUTPATIENT)
Dept: FAMILY MEDICINE | Facility: CLINIC | Age: 48
End: 2023-01-25
Payer: COMMERCIAL

## 2023-01-25 NOTE — TELEPHONE ENCOUNTER
----- Message from Adwoa Mueller sent at 1/25/2023  2:14 PM CST -----  Contact: [place]  Patient is calling to speak with the nurse in regards to appt. Reports has a sinus infection and would like to get a virtual appt between tomorrow and Friday. Please give patient a callback at .[phone]

## 2023-01-26 ENCOUNTER — OFFICE VISIT (OUTPATIENT)
Dept: FAMILY MEDICINE | Facility: CLINIC | Age: 48
End: 2023-01-26
Payer: COMMERCIAL

## 2023-01-26 DIAGNOSIS — J01.90 ACUTE BACTERIAL SINUSITIS: Primary | ICD-10-CM

## 2023-01-26 DIAGNOSIS — B96.89 ACUTE BACTERIAL SINUSITIS: Primary | ICD-10-CM

## 2023-01-26 DIAGNOSIS — F90.9 ADULT ADHD: ICD-10-CM

## 2023-01-26 PROCEDURE — 1160F RVW MEDS BY RX/DR IN RCRD: CPT | Mod: CPTII,95,, | Performed by: FAMILY MEDICINE

## 2023-01-26 PROCEDURE — 1159F MED LIST DOCD IN RCRD: CPT | Mod: CPTII,95,, | Performed by: FAMILY MEDICINE

## 2023-01-26 PROCEDURE — 99214 PR OFFICE/OUTPT VISIT, EST, LEVL IV, 30-39 MIN: ICD-10-PCS | Mod: 95,,, | Performed by: FAMILY MEDICINE

## 2023-01-26 PROCEDURE — 1160F PR REVIEW ALL MEDS BY PRESCRIBER/CLIN PHARMACIST DOCUMENTED: ICD-10-PCS | Mod: CPTII,95,, | Performed by: FAMILY MEDICINE

## 2023-01-26 PROCEDURE — 1159F PR MEDICATION LIST DOCUMENTED IN MEDICAL RECORD: ICD-10-PCS | Mod: CPTII,95,, | Performed by: FAMILY MEDICINE

## 2023-01-26 PROCEDURE — 99214 OFFICE O/P EST MOD 30 MIN: CPT | Mod: 95,,, | Performed by: FAMILY MEDICINE

## 2023-01-26 RX ORDER — LISDEXAMFETAMINE DIMESYLATE 70 MG/1
70 CAPSULE ORAL EVERY MORNING
Qty: 30 CAPSULE | Refills: 0 | Status: SHIPPED | OUTPATIENT
Start: 2023-04-10 | End: 2023-05-10 | Stop reason: SDUPTHER

## 2023-01-26 RX ORDER — LISDEXAMFETAMINE DIMESYLATE 70 MG/1
70 CAPSULE ORAL EVERY MORNING
Qty: 30 CAPSULE | Refills: 0 | Status: SHIPPED | OUTPATIENT
Start: 2023-03-10 | End: 2023-05-10 | Stop reason: SDUPTHER

## 2023-01-26 RX ORDER — CEPHALEXIN 500 MG/1
500 CAPSULE ORAL EVERY 8 HOURS
Qty: 21 CAPSULE | Refills: 0 | Status: SHIPPED | OUTPATIENT
Start: 2023-01-26 | End: 2023-07-24

## 2023-01-26 RX ORDER — LISDEXAMFETAMINE DIMESYLATE 70 MG/1
70 CAPSULE ORAL EVERY MORNING
Qty: 30 CAPSULE | Refills: 0 | Status: SHIPPED | OUTPATIENT
Start: 2023-02-10 | End: 2023-05-10 | Stop reason: SDUPTHER

## 2023-01-26 NOTE — PROGRESS NOTES
Chief Complaint:  No chief complaint on file.      History of Present Illness:  Patient with a hx of DDD, adult ADHD, and sacroilitis presents today for a virtual visit    Vyvanse working well, no side effects  Has been dealing with URI Sx since christmas. Has congestion, HA, sinus pressure  Using Sudafed and Mucinex which helps  No fever, SOB, CP, n/v/d      ROS:  Review of Systems   Constitutional:  Negative for activity change, appetite change, chills, fever and unexpected weight change.   HENT:  Positive for congestion, sinus pressure and sinus pain. Negative for ear pain, hearing loss, postnasal drip, rhinorrhea and trouble swallowing.    Eyes:  Negative for pain, discharge and visual disturbance.   Respiratory:  Negative for cough, chest tightness, shortness of breath and wheezing.    Cardiovascular:  Negative for chest pain and palpitations.   Gastrointestinal:  Negative for abdominal pain, blood in stool, constipation, diarrhea, nausea and vomiting.   Endocrine: Negative for polydipsia and polyuria.   Genitourinary:  Negative for difficulty urinating, dysuria, flank pain, hematuria and menstrual problem.   Musculoskeletal:  Negative for arthralgias, joint swelling, myalgias and neck pain.   Skin:  Negative for pallor and wound.   Neurological:  Positive for headaches. Negative for dizziness, tremors, speech difficulty, weakness and light-headedness.   Psychiatric/Behavioral:  Negative for behavioral problems, confusion, dysphoric mood and sleep disturbance. The patient is not nervous/anxious.    All other systems reviewed and are negative.    Past Medical History:   Diagnosis Date    DDD (degenerative disc disease), lumbar     Fatty liver     Nicotine abuse        Social History:  Social History     Socioeconomic History    Marital status:    Occupational History     Employer: homemaker   Tobacco Use    Smoking status: Former     Types: Cigarettes     Quit date: 10/7/2015     Years since quitting:  7.3    Smokeless tobacco: Never   Substance and Sexual Activity    Alcohol use: Yes     Alcohol/week: 0.0 standard drinks     Comment: occasionally     Drug use: No    Sexual activity: Yes     Partners: Male     Birth control/protection: None     Social Determinants of Health     Financial Resource Strain: Unknown    Difficulty of Paying Living Expenses: Patient refused   Food Insecurity: Unknown    Worried About Running Out of Food in the Last Year: Patient refused    Ran Out of Food in the Last Year: Patient refused   Transportation Needs: Unknown    Lack of Transportation (Medical): Patient refused    Lack of Transportation (Non-Medical): Patient refused   Physical Activity: Insufficiently Active    Days of Exercise per Week: 2 days    Minutes of Exercise per Session: 10 min   Stress: No Stress Concern Present    Feeling of Stress : Not at all   Social Connections: Unknown    Frequency of Communication with Friends and Family: Patient refused    Frequency of Social Gatherings with Friends and Family: Patient refused    Active Member of Clubs or Organizations: Patient refused    Attends Club or Organization Meetings: Patient refused    Marital Status:    Housing Stability: Unknown    Unable to Pay for Housing in the Last Year: Patient refused    Unstable Housing in the Last Year: Patient refused       Family History:   family history includes Breast cancer in her maternal grandmother; Breast cancer (age of onset: 50) in her mother; Cancer in her maternal grandmother; Diabetes in her cousin; Heart attack in her maternal grandmother; Hypertension in her maternal grandmother.    Health Maintenance   Topic Date Due    Lipid Panel  11/06/2022    Mammogram  03/18/2023    TETANUS VACCINE  03/15/2023    Hepatitis C Screening  Completed       Physical Exam:     ]    There is no height or weight on file to calculate BMI.    Physical Exam      Assessment:      ICD-10-CM ICD-9-CM   1. Acute bacterial sinusitis  J01.90  461.9    B96.89    2. Adult ADHD  F90.9 314.01         Plan:   checked. Predated Vyvanse sent in.   Start Keflex for 7 days. If no improvement follow up again    The patient location is: Home   The chief complaint leading to consultation is: as below  Visit type: Virtual visit with synchronous audio and video    Each patient to whom he or she provides medical services by telemedicine is:  (1) informed of the relationship between the physician and patient and the respective role of any other health care provider with respect to management of the patient; and (2) notified that he or she may decline to receive medical services by telemedicine and may withdraw from such care at any time.    Notes: see below      Orders Placed This Encounter    cephALEXin (KEFLEX) 500 MG capsule    lisdexamfetamine (VYVANSE) 70 MG capsule    lisdexamfetamine (VYVANSE) 70 MG capsule    lisdexamfetamine (VYVANSE) 70 MG capsule        Current Outpatient Medications   Medication Sig Dispense Refill    azelastine (ASTELIN) 137 mcg (0.1 %) nasal spray 1 spray (137 mcg total) by Nasal route 2 (two) times daily. 30 mL 0    celecoxib (CELEBREX) 200 MG capsule Take 1 capsule (200 mg total) by mouth once daily. 30 capsule 0    cephALEXin (KEFLEX) 500 MG capsule Take 1 capsule (500 mg total) by mouth every 8 (eight) hours. 21 capsule 0    fluticasone propionate (FLONASE) 50 mcg/actuation nasal spray 2 sprays (100 mcg total) by Each Nostril route once daily. 16 g 11    [START ON 4/10/2023] lisdexamfetamine (VYVANSE) 70 MG capsule Take 1 capsule (70 mg total) by mouth every morning. 30 capsule 0    [START ON 3/10/2023] lisdexamfetamine (VYVANSE) 70 MG capsule Take 1 capsule (70 mg total) by mouth every morning. 30 capsule 0    [START ON 2/10/2023] lisdexamfetamine (VYVANSE) 70 MG capsule Take 1 capsule (70 mg total) by mouth every morning. 30 capsule 0    rOPINIRole (REQUIP) 0.5 MG tablet TAKE ONE TABLET BY MOUTH EVERY EVENING 30 tablet 11     tiZANidine (ZANAFLEX) 4 MG tablet TAKE ONE TABLET BY MOUTH EVERY 8 HOURS 30 tablet 1    valACYclovir (VALTREX) 1000 MG tablet Take 1 tablet by mouth once daily.      VYVANSE 70 mg capsule Take 1 capsule (70 mg total) by mouth every morning. 30 capsule 0     No current facility-administered medications for this visit.       Medications Discontinued During This Encounter   Medication Reason    lisdexamfetamine (VYVANSE) 70 MG capsule Reorder    lisdexamfetamine (VYVANSE) 70 MG capsule Reorder    lisdexamfetamine (VYVANSE) 70 MG capsule Reorder       No follow-ups on file.      Jennifer Summers MD  Scribe Attestation:   I, Katie Arevalo, am scribing for, and in the presence of, Dr.Arif Summers I performed the above scribed service and the documentation accurately describes the services I performed. I attest to the accuracy of the note.    I, Dr. Jennifer Summers, reviewed documentation as scribed above. I performed the services described in this documentation.  I agree that the record reflects my personal performance and is accurate and complete. Jennifer Summers MD.  10/04/2021

## 2023-01-30 PROBLEM — M46.1 SACROILIITIS: Status: RESOLVED | Noted: 2021-12-15 | Resolved: 2023-01-30

## 2023-02-07 ENCOUNTER — PATIENT MESSAGE (OUTPATIENT)
Dept: FAMILY MEDICINE | Facility: CLINIC | Age: 48
End: 2023-02-07
Payer: COMMERCIAL

## 2023-02-07 DIAGNOSIS — M79.89 SWELLING OF LEFT FOOT: Primary | ICD-10-CM

## 2023-02-07 NOTE — TELEPHONE ENCOUNTER
"C/o foot pain with swelling  "on the top" . I made her an appt for Friday she is asking if you will get an xray of  the foot tomorrow?   "

## 2023-02-10 ENCOUNTER — OFFICE VISIT (OUTPATIENT)
Dept: FAMILY MEDICINE | Facility: CLINIC | Age: 48
End: 2023-02-10
Payer: COMMERCIAL

## 2023-02-10 VITALS
HEART RATE: 84 BPM | BODY MASS INDEX: 30.08 KG/M2 | HEIGHT: 69 IN | SYSTOLIC BLOOD PRESSURE: 114 MMHG | DIASTOLIC BLOOD PRESSURE: 84 MMHG | WEIGHT: 203.06 LBS | OXYGEN SATURATION: 97 %

## 2023-02-10 DIAGNOSIS — M79.672 LEFT FOOT PAIN: ICD-10-CM

## 2023-02-10 DIAGNOSIS — R04.0 RIGHT-SIDED EPISTAXIS: Primary | ICD-10-CM

## 2023-02-10 DIAGNOSIS — H04.123 DRY EYES: ICD-10-CM

## 2023-02-10 PROCEDURE — 3079F DIAST BP 80-89 MM HG: CPT | Mod: CPTII,S$GLB,, | Performed by: FAMILY MEDICINE

## 2023-02-10 PROCEDURE — 3079F PR MOST RECENT DIASTOLIC BLOOD PRESSURE 80-89 MM HG: ICD-10-PCS | Mod: CPTII,S$GLB,, | Performed by: FAMILY MEDICINE

## 2023-02-10 PROCEDURE — 99214 OFFICE O/P EST MOD 30 MIN: CPT | Mod: S$GLB,,, | Performed by: FAMILY MEDICINE

## 2023-02-10 PROCEDURE — 99999 PR PBB SHADOW E&M-EST. PATIENT-LVL IV: CPT | Mod: PBBFAC,,, | Performed by: FAMILY MEDICINE

## 2023-02-10 PROCEDURE — 3008F BODY MASS INDEX DOCD: CPT | Mod: CPTII,S$GLB,, | Performed by: FAMILY MEDICINE

## 2023-02-10 PROCEDURE — 3008F PR BODY MASS INDEX (BMI) DOCUMENTED: ICD-10-PCS | Mod: CPTII,S$GLB,, | Performed by: FAMILY MEDICINE

## 2023-02-10 PROCEDURE — 3074F PR MOST RECENT SYSTOLIC BLOOD PRESSURE < 130 MM HG: ICD-10-PCS | Mod: CPTII,S$GLB,, | Performed by: FAMILY MEDICINE

## 2023-02-10 PROCEDURE — 1159F MED LIST DOCD IN RCRD: CPT | Mod: CPTII,S$GLB,, | Performed by: FAMILY MEDICINE

## 2023-02-10 PROCEDURE — 1159F PR MEDICATION LIST DOCUMENTED IN MEDICAL RECORD: ICD-10-PCS | Mod: CPTII,S$GLB,, | Performed by: FAMILY MEDICINE

## 2023-02-10 PROCEDURE — 3074F SYST BP LT 130 MM HG: CPT | Mod: CPTII,S$GLB,, | Performed by: FAMILY MEDICINE

## 2023-02-10 PROCEDURE — 99214 PR OFFICE/OUTPT VISIT, EST, LEVL IV, 30-39 MIN: ICD-10-PCS | Mod: S$GLB,,, | Performed by: FAMILY MEDICINE

## 2023-02-10 PROCEDURE — 99999 PR PBB SHADOW E&M-EST. PATIENT-LVL IV: ICD-10-PCS | Mod: PBBFAC,,, | Performed by: FAMILY MEDICINE

## 2023-02-10 NOTE — PROGRESS NOTES
Chief Complaint:    Chief Complaint   Patient presents with    Epistaxis       History of Present Illness:  Patient with DDD, adult ADHD, and sacroilitis presents today for an annual follow up.     Presents today she is had nosebleed from the right nostril for several days she said it bleeds a lot she is not on any blood thinner.  In the past she has had a nosebleed from the left nostril.    Also complaining of left foot pain she felt the pain while walking she has not walked in few days and that is not that bad.      ROS:  Review of Systems   Constitutional:  Negative for activity change, appetite change, chills, fever and unexpected weight change.   HENT:  Positive for nosebleeds. Negative for congestion, ear pain, hearing loss, postnasal drip, rhinorrhea, sinus pressure, sinus pain and trouble swallowing.    Eyes:  Negative for pain, discharge and visual disturbance.   Respiratory:  Negative for cough, chest tightness, shortness of breath and wheezing.    Cardiovascular:  Negative for chest pain and palpitations.   Gastrointestinal:  Positive for constipation. Negative for abdominal pain, blood in stool, diarrhea, nausea and vomiting.   Endocrine: Negative for polydipsia and polyuria.   Genitourinary:  Negative for difficulty urinating, dysuria, flank pain, hematuria and menstrual problem.   Musculoskeletal:  Positive for arthralgias. Negative for joint swelling, myalgias and neck pain.   Skin:  Negative for pallor and wound.   Neurological:  Negative for dizziness, tremors, speech difficulty, weakness, light-headedness and headaches.   Psychiatric/Behavioral:  Negative for behavioral problems, confusion, dysphoric mood and sleep disturbance. The patient is not nervous/anxious.    All other systems reviewed and are negative.    Past Medical History:   Diagnosis Date    DDD (degenerative disc disease), lumbar     Fatty liver     Nicotine abuse        Social History:  Social History     Socioeconomic History     Marital status:    Occupational History     Employer: homemaker   Tobacco Use    Smoking status: Former     Types: Cigarettes     Quit date: 10/7/2015     Years since quittin.3    Smokeless tobacco: Never   Substance and Sexual Activity    Alcohol use: Yes     Alcohol/week: 0.0 standard drinks     Comment: occasionally     Drug use: No    Sexual activity: Yes     Partners: Male     Birth control/protection: None     Social Determinants of Health     Financial Resource Strain: Unknown    Difficulty of Paying Living Expenses: Patient refused   Food Insecurity: Unknown    Worried About Running Out of Food in the Last Year: Patient refused    Ran Out of Food in the Last Year: Patient refused   Transportation Needs: Unknown    Lack of Transportation (Medical): Patient refused    Lack of Transportation (Non-Medical): Patient refused   Physical Activity: Sufficiently Active    Days of Exercise per Week: 3 days    Minutes of Exercise per Session: 60 min   Stress: Unknown    Feeling of Stress : Patient refused   Social Connections: Unknown    Frequency of Communication with Friends and Family: Patient refused    Frequency of Social Gatherings with Friends and Family: Patient refused    Active Member of Clubs or Organizations: Patient refused    Attends Club or Organization Meetings: Patient refused    Marital Status:    Housing Stability: Unknown    Unable to Pay for Housing in the Last Year: Patient refused    Unstable Housing in the Last Year: Patient refused       Family History:   family history includes Breast cancer in her maternal grandmother; Breast cancer (age of onset: 50) in her mother; Cancer in her maternal grandmother; Diabetes in her cousin; Heart attack in her maternal grandmother; Hypertension in her maternal grandmother.    Health Maintenance   Topic Date Due    Lipid Panel  2022    Mammogram  2023    TETANUS VACCINE  03/15/2023    Hepatitis C Screening  Completed  "      Physical Exam:    Vital Signs  Pulse: 84  SpO2: 97 %  BP: 114/84  Pain Score: 0-No pain  Height and Weight  Height: 5' 9" (175.3 cm)  Weight: 92.1 kg (203 lb 0.7 oz)  BSA (Calculated - sq m): 2.12 sq meters  BMI (Calculated): 30  Weight in (lb) to have BMI = 25: 168.9]    Body mass index is 29.98 kg/m².    Physical Exam  Vitals and nursing note reviewed.   Constitutional:       Appearance: Normal appearance. She is not toxic-appearing.   HENT:      Head: Normocephalic and atraumatic.      Right Ear: Tympanic membrane normal.      Left Ear: Tympanic membrane normal.      Nose:      Comments: No bleeding identified  Eyes:      Extraocular Movements: Extraocular movements intact.      Pupils: Pupils are equal, round, and reactive to light.   Neck:     Cardiovascular:      Rate and Rhythm: Normal rate and regular rhythm.      Heart sounds: Normal heart sounds.   Pulmonary:      Effort: Pulmonary effort is normal.      Breath sounds: Normal breath sounds. No wheezing, rhonchi or rales.   Abdominal:      General: Bowel sounds are normal. There is no distension.      Palpations: Abdomen is soft.      Tenderness: There is no abdominal tenderness.   Musculoskeletal:         General: Normal range of motion.      Cervical back: Normal range of motion. Muscular tenderness present.        Feet:       Comments: Tenderness as shown, no pain on rom   Skin:     General: Skin is warm and dry.      Capillary Refill: Capillary refill takes less than 2 seconds.   Neurological:      General: No focal deficit present.      Mental Status: She is alert and oriented to person, place, and time.   Psychiatric:         Mood and Affect: Mood normal.         Behavior: Behavior normal.         Judgment: Judgment normal.         Assessment:      ICD-10-CM ICD-9-CM   1. Right-sided epistaxis  R04.0 784.7   2. Dry eyes  H04.123 375.15   3. Left foot pain  M79.672 729.5     Plan:       She will be referred to ENT for nasopharyngoscopy exam of " the nose since no bleeders could be identified in the right nostril.    Recommend using a walking boot for a few weeks to help with the foot pain x-ray pending   She will be seeing an eye doctor for dry eyes she is had some vision issues.    Orders Placed This Encounter   Procedures    Ambulatory referral/consult to ENT     Current Outpatient Medications   Medication Sig Dispense Refill    Lactobac no.41/Bifidobact no.7 (PROBIOTIC-10 ORAL) Take by mouth.      [START ON 4/10/2023] lisdexamfetamine (VYVANSE) 70 MG capsule Take 1 capsule (70 mg total) by mouth every morning. 30 capsule 0    [START ON 3/10/2023] lisdexamfetamine (VYVANSE) 70 MG capsule Take 1 capsule (70 mg total) by mouth every morning. 30 capsule 0    lisdexamfetamine (VYVANSE) 70 MG capsule Take 1 capsule (70 mg total) by mouth every morning. 30 capsule 0    rOPINIRole (REQUIP) 0.5 MG tablet TAKE ONE TABLET BY MOUTH EVERY EVENING 30 tablet 11    tiZANidine (ZANAFLEX) 4 MG tablet TAKE ONE TABLET BY MOUTH EVERY 8 HOURS 30 tablet 1    valACYclovir (VALTREX) 1000 MG tablet Take 1 tablet by mouth once daily.      VYVANSE 70 mg capsule Take 1 capsule (70 mg total) by mouth every morning. 30 capsule 0    azelastine (ASTELIN) 137 mcg (0.1 %) nasal spray 1 spray (137 mcg total) by Nasal route 2 (two) times daily. 30 mL 0    celecoxib (CELEBREX) 200 MG capsule Take 1 capsule (200 mg total) by mouth once daily. (Patient not taking: Reported on 2/10/2023) 30 capsule 0    cephALEXin (KEFLEX) 500 MG capsule Take 1 capsule (500 mg total) by mouth every 8 (eight) hours. (Patient not taking: Reported on 2/10/2023) 21 capsule 0    fluticasone propionate (FLONASE) 50 mcg/actuation nasal spray 2 sprays (100 mcg total) by Each Nostril route once daily. (Patient not taking: Reported on 2/10/2023) 16 g 11     No current facility-administered medications for this visit.       There are no discontinued medications.        No follow-ups on file.      MD Corey Medina  Attestation:   I, Katie Arevalo, am scribing for, and in the presence of, Dr.Arif Summers I performed the above scribed service and the documentation accurately describes the services I performed. I attest to the accuracy of the note.    I, Dr. Jennifer Summers, reviewed documentation as scribed above. I performed the services described in this documentation.  I agree that the record reflects my personal performance and is accurate and complete. Jennifer Summers MD.  02/10/2023

## 2023-02-21 ENCOUNTER — PATIENT MESSAGE (OUTPATIENT)
Dept: OTOLARYNGOLOGY | Facility: CLINIC | Age: 48
End: 2023-02-21
Payer: COMMERCIAL

## 2023-03-29 DIAGNOSIS — Z12.31 OTHER SCREENING MAMMOGRAM: ICD-10-CM

## 2023-04-05 ENCOUNTER — HOSPITAL ENCOUNTER (OUTPATIENT)
Dept: RADIOLOGY | Facility: HOSPITAL | Age: 48
Discharge: HOME | End: 2023-04-05
Attending: FAMILY MEDICINE
Payer: COMMERCIAL

## 2023-04-05 DIAGNOSIS — Z12.31 OTHER SCREENING MAMMOGRAM: ICD-10-CM

## 2023-04-05 PROCEDURE — 77067 SCR MAMMO BI INCL CAD: CPT | Mod: TC

## 2023-04-05 PROCEDURE — 77063 MAMMO DIGITAL SCREENING BILAT WITH TOMO: ICD-10-PCS | Mod: 26,,, | Performed by: RADIOLOGY

## 2023-04-05 PROCEDURE — 77067 SCR MAMMO BI INCL CAD: CPT | Mod: 26,,, | Performed by: RADIOLOGY

## 2023-04-05 PROCEDURE — 77063 BREAST TOMOSYNTHESIS BI: CPT | Mod: 26,,, | Performed by: RADIOLOGY

## 2023-04-05 PROCEDURE — 77067 MAMMO DIGITAL SCREENING BILAT WITH TOMO: ICD-10-PCS | Mod: 26,,, | Performed by: RADIOLOGY

## 2023-04-19 ENCOUNTER — PATIENT MESSAGE (OUTPATIENT)
Dept: ADMINISTRATIVE | Facility: HOSPITAL | Age: 48
End: 2023-04-19
Payer: COMMERCIAL

## 2023-04-19 ENCOUNTER — PATIENT MESSAGE (OUTPATIENT)
Dept: FAMILY MEDICINE | Facility: CLINIC | Age: 48
End: 2023-04-19
Payer: COMMERCIAL

## 2023-05-10 ENCOUNTER — OFFICE VISIT (OUTPATIENT)
Dept: FAMILY MEDICINE | Facility: CLINIC | Age: 48
End: 2023-05-10
Payer: COMMERCIAL

## 2023-05-10 DIAGNOSIS — F90.9 ADULT ADHD: Primary | ICD-10-CM

## 2023-05-10 PROCEDURE — 99213 PR OFFICE/OUTPT VISIT, EST, LEVL III, 20-29 MIN: ICD-10-PCS | Mod: 95,,, | Performed by: FAMILY MEDICINE

## 2023-05-10 PROCEDURE — 99213 OFFICE O/P EST LOW 20 MIN: CPT | Mod: 95,,, | Performed by: FAMILY MEDICINE

## 2023-05-10 RX ORDER — LISDEXAMFETAMINE DIMESYLATE 70 MG/1
70 CAPSULE ORAL EVERY MORNING
Qty: 30 CAPSULE | Refills: 0 | Status: SHIPPED | OUTPATIENT
Start: 2023-07-14 | End: 2023-07-24 | Stop reason: SDUPTHER

## 2023-05-10 RX ORDER — LISDEXAMFETAMINE DIMESYLATE 70 MG/1
70 CAPSULE ORAL EVERY MORNING
Qty: 30 CAPSULE | Refills: 0 | Status: SHIPPED | OUTPATIENT
Start: 2023-06-14 | End: 2023-07-24 | Stop reason: SDUPTHER

## 2023-05-10 RX ORDER — LISDEXAMFETAMINE DIMESYLATE 70 MG/1
70 CAPSULE ORAL EVERY MORNING
Qty: 30 CAPSULE | Refills: 0 | Status: SHIPPED | OUTPATIENT
Start: 2023-05-14 | End: 2023-07-24 | Stop reason: SDUPTHER

## 2023-05-10 NOTE — PROGRESS NOTES
Chief Complaint:  No chief complaint on file.      History of Present Illness:    Pt is here for f/u of ADD/ADHD. Doing well, no side effect from the medications, meds helping him concentrate and do what needs done.      ROS:  Review of Systems   Constitutional: Negative for activity change, appetite change and unexpected weight change.   Cardiovascular: Negative for palpitations.   Gastrointestinal: Negative for abdominal pain.   Musculoskeletal: Negative for myalgias.   Neurological: Negative for dizziness, tremors, light-headedness and headaches.   Psychiatric/Behavioral: Negative for agitation, behavioral problems, confusion, decreased concentration, dysphoric mood, hallucinations, sleep disturbance and suicidal ideas. The patient is not nervous/anxious and is not hyperactive.      Past Medical History:   Diagnosis Date    DDD (degenerative disc disease), lumbar     Fatty liver     Nicotine abuse        Social History:  Social History     Socioeconomic History    Marital status:    Occupational History     Employer: homemaker   Tobacco Use    Smoking status: Former     Types: Cigarettes     Quit date: 10/7/2015     Years since quittin.5    Smokeless tobacco: Never   Substance and Sexual Activity    Alcohol use: Yes     Alcohol/week: 0.0 standard drinks     Comment: occasionally     Drug use: No    Sexual activity: Yes     Partners: Male     Birth control/protection: None     Social Determinants of Health     Financial Resource Strain: Unknown    Difficulty of Paying Living Expenses: Patient refused   Food Insecurity: Unknown    Worried About Running Out of Food in the Last Year: Patient refused    Ran Out of Food in the Last Year: Patient refused   Transportation Needs: Unknown    Lack of Transportation (Medical): Patient refused    Lack of Transportation (Non-Medical): Patient refused   Physical Activity: Sufficiently Active    Days of Exercise per Week: 3 days    Minutes of Exercise per Session: 60  min   Stress: Unknown    Feeling of Stress : Patient refused   Social Connections: Unknown    Frequency of Communication with Friends and Family: Patient refused    Frequency of Social Gatherings with Friends and Family: Patient refused    Active Member of Clubs or Organizations: Patient refused    Attends Club or Organization Meetings: Patient refused    Marital Status:    Housing Stability: Unknown    Unable to Pay for Housing in the Last Year: Patient refused    Unstable Housing in the Last Year: Patient refused       Family History:   family history includes Breast cancer in her maternal grandmother; Breast cancer (age of onset: 50) in her mother; Cancer in her maternal grandmother; Diabetes in her cousin; Heart attack in her maternal grandmother; Hypertension in her maternal grandmother.    Health Maintenance   Topic Date Due    Lipid Panel  11/06/2022    TETANUS VACCINE  03/15/2023    Mammogram  04/05/2024    Hepatitis C Screening  Completed       Physical Exam:     ]     Physical Exam       There is no height or weight on file to calculate BMI.      Assessment:      ICD-10-CM ICD-9-CM   1. Adult ADHD  F90.9 314.01         Plan:  Please continue current medications.    verified and 3 Rxs issued  F/u 3 mos    The patient location is: Home   The chief complaint leading to consultation is: as below  Visit type: Virtual visit with synchronous audio and video  Total time spent with patient: 20+ mins  Each patient to whom he or she provides medical services by telemedicine is:  (1) informed of the relationship between the physician and patient and the respective role of any other health care provider with respect to management of the patient; and (2) notified that he or she may decline to receive medical services by telemedicine and may withdraw from such care at any time.    Notes: see below      No orders of the defined types were placed in this encounter.      Current Outpatient Medications   Medication  Sig Dispense Refill    azelastine (ASTELIN) 137 mcg (0.1 %) nasal spray 1 spray (137 mcg total) by Nasal route 2 (two) times daily. 30 mL 0    celecoxib (CELEBREX) 200 MG capsule Take 1 capsule (200 mg total) by mouth once daily. (Patient not taking: Reported on 2/10/2023) 30 capsule 0    cephALEXin (KEFLEX) 500 MG capsule Take 1 capsule (500 mg total) by mouth every 8 (eight) hours. (Patient not taking: Reported on 2/10/2023) 21 capsule 0    fluticasone propionate (FLONASE) 50 mcg/actuation nasal spray 2 sprays (100 mcg total) by Each Nostril route once daily. (Patient not taking: Reported on 2/10/2023) 16 g 11    Lactobac no.41/Bifidobact no.7 (PROBIOTIC-10 ORAL) Take by mouth.      [START ON 7/14/2023] lisdexamfetamine (VYVANSE) 70 MG capsule Take 1 capsule (70 mg total) by mouth every morning. 30 capsule 0    [START ON 6/14/2023] lisdexamfetamine (VYVANSE) 70 MG capsule Take 1 capsule (70 mg total) by mouth every morning. 30 capsule 0    [START ON 5/14/2023] lisdexamfetamine (VYVANSE) 70 MG capsule Take 1 capsule (70 mg total) by mouth every morning. 30 capsule 0    rOPINIRole (REQUIP) 0.5 MG tablet TAKE ONE TABLET BY MOUTH EVERY EVENING 30 tablet 11    tiZANidine (ZANAFLEX) 4 MG tablet TAKE ONE TABLET BY MOUTH EVERY 8 HOURS 30 tablet 1    valACYclovir (VALTREX) 1000 MG tablet Take 1 tablet by mouth once daily.      VYVANSE 70 mg capsule Take 1 capsule (70 mg total) by mouth every morning. 30 capsule 0     No current facility-administered medications for this visit.       Medications Discontinued During This Encounter   Medication Reason    lisdexamfetamine (VYVANSE) 70 MG capsule Reorder    lisdexamfetamine (VYVANSE) 70 MG capsule Reorder    lisdexamfetamine (VYVANSE) 70 MG capsule Reorder             Jennifer Summers MD            Answers submitted by the patient for this visit:  Review of Systems Questionnaire (Submitted on 5/9/2023)  activity change: No  unexpected weight change: No  neck pain: No  hearing  loss: No  rhinorrhea: No  trouble swallowing: No  eye discharge: No  visual disturbance: No  chest tightness: No  wheezing: No  chest pain: No  palpitations: No  blood in stool: No  constipation: No  vomiting: No  diarrhea: No  polydipsia: No  polyuria: No  difficulty urinating: No  hematuria: No  menstrual problem: No  dysuria: No  joint swelling: No  arthralgias: No  headaches: No  weakness: No  confusion: No  dysphoric mood: No

## 2023-07-24 ENCOUNTER — OFFICE VISIT (OUTPATIENT)
Dept: FAMILY MEDICINE | Facility: CLINIC | Age: 48
End: 2023-07-24
Payer: COMMERCIAL

## 2023-07-24 ENCOUNTER — PATIENT MESSAGE (OUTPATIENT)
Dept: FAMILY MEDICINE | Facility: CLINIC | Age: 48
End: 2023-07-24

## 2023-07-24 ENCOUNTER — TELEPHONE (OUTPATIENT)
Dept: FAMILY MEDICINE | Facility: CLINIC | Age: 48
End: 2023-07-24

## 2023-07-24 DIAGNOSIS — F90.9 ADULT ADHD: Primary | ICD-10-CM

## 2023-07-24 DIAGNOSIS — J30.9 ALLERGIC RHINITIS, UNSPECIFIED SEASONALITY, UNSPECIFIED TRIGGER: ICD-10-CM

## 2023-07-24 PROCEDURE — 99213 OFFICE O/P EST LOW 20 MIN: CPT | Mod: 95,,, | Performed by: FAMILY MEDICINE

## 2023-07-24 PROCEDURE — 99213 PR OFFICE/OUTPT VISIT, EST, LEVL III, 20-29 MIN: ICD-10-PCS | Mod: 95,,, | Performed by: FAMILY MEDICINE

## 2023-07-24 RX ORDER — LISDEXAMFETAMINE DIMESYLATE 70 MG/1
70 CAPSULE ORAL EVERY MORNING
Qty: 30 CAPSULE | Refills: 0 | Status: SHIPPED | OUTPATIENT
Start: 2023-08-24 | End: 2023-09-01 | Stop reason: RX

## 2023-07-24 RX ORDER — LISDEXAMFETAMINE DIMESYLATE 70 MG/1
70 CAPSULE ORAL EVERY MORNING
Qty: 30 CAPSULE | Refills: 0 | Status: SHIPPED | OUTPATIENT
Start: 2023-09-24

## 2023-07-24 RX ORDER — LISDEXAMFETAMINE DIMESYLATE 70 MG/1
70 CAPSULE ORAL EVERY MORNING
Qty: 30 CAPSULE | Refills: 0 | Status: SHIPPED | OUTPATIENT
Start: 2023-07-24 | End: 2023-10-25 | Stop reason: SDUPTHER

## 2023-07-24 NOTE — TELEPHONE ENCOUNTER
----- Message from Monika Munson sent at 7/24/2023  4:40 PM CDT -----  Contact: Venice Trimble called regarding her prescription of lisdexamfetamine (VYVANSE) 70 MG capsule. Please call her back at [phone].    Thanks  TS

## 2023-07-24 NOTE — PROGRESS NOTES
Chief Complaint:  No chief complaint on file.      History of Present Illness:    Patient presents today for medication refill,    Doing well, compliant with medication. No side effects on Vyvanse    ROS:  Review of Systems    Past Medical History:   Diagnosis Date    DDD (degenerative disc disease), lumbar     Fatty liver     Nicotine abuse        Social History:  Social History     Socioeconomic History    Marital status:    Occupational History     Employer: homemaker   Tobacco Use    Smoking status: Former     Types: Cigarettes     Quit date: 10/7/2015     Years since quittin.8    Smokeless tobacco: Never   Substance and Sexual Activity    Alcohol use: Yes     Alcohol/week: 0.0 standard drinks     Comment: occasionally     Drug use: No    Sexual activity: Yes     Partners: Male     Birth control/protection: None     Social Determinants of Health     Financial Resource Strain: Unknown    Difficulty of Paying Living Expenses: Patient refused   Food Insecurity: Unknown    Worried About Running Out of Food in the Last Year: Patient refused    Ran Out of Food in the Last Year: Patient refused   Transportation Needs: Unknown    Lack of Transportation (Medical): Patient refused    Lack of Transportation (Non-Medical): Patient refused   Physical Activity: Insufficiently Active    Days of Exercise per Week: 2 days    Minutes of Exercise per Session: 60 min   Stress: Unknown    Feeling of Stress : Patient refused   Social Connections: Unknown    Frequency of Communication with Friends and Family: Patient refused    Frequency of Social Gatherings with Friends and Family: Patient refused    Active Member of Clubs or Organizations: Patient refused    Attends Club or Organization Meetings: Patient refused    Marital Status: Patient refused   Housing Stability: Unknown    Unable to Pay for Housing in the Last Year: Patient refused    Unstable Housing in the Last Year: Patient refused       Family History:   family  history includes Breast cancer in her maternal grandmother; Breast cancer (age of onset: 50) in her mother; Cancer in her maternal grandmother; Diabetes in her cousin; Heart attack in her maternal grandmother; Hypertension in her maternal grandmother.    Health Maintenance   Topic Date Due    Lipid Panel  11/06/2022    TETANUS VACCINE  03/15/2023    Mammogram  04/05/2024    Hepatitis C Screening  Completed       Exam:Physical      ]    There is no height or weight on file to calculate BMI.    Physical Exam  Cardiovascular:      Rate and Rhythm: Normal rate.         Assessment:      ICD-10-CM ICD-9-CM   1. Adult ADHD  F90.9 314.01         Plan:     checked, print Rx  Vyvanse refilled          No follow-ups on file.      Jennifer Summers MD    Scribe Attestation:   I, Gregorio Rojo, am scribing for, and in the presence of, Dr.Arif Summers I performed the above scribed service and the documentation accurately describes the services I performed. I attest to the accuracy of the note.    I, Dr. Jennifer Summers, reviewed documentation as scribed above. I performed the services described in this documentation.  I agree that the record reflects my personal performance and is accurate and complete. Jennifer Summers MD.  07/24/2023

## 2023-08-31 ENCOUNTER — PATIENT MESSAGE (OUTPATIENT)
Dept: FAMILY MEDICINE | Facility: CLINIC | Age: 48
End: 2023-08-31
Payer: COMMERCIAL

## 2023-09-01 ENCOUNTER — PATIENT MESSAGE (OUTPATIENT)
Dept: FAMILY MEDICINE | Facility: CLINIC | Age: 48
End: 2023-09-01
Payer: COMMERCIAL

## 2023-09-01 ENCOUNTER — TELEPHONE (OUTPATIENT)
Dept: FAMILY MEDICINE | Facility: CLINIC | Age: 48
End: 2023-09-01
Payer: COMMERCIAL

## 2023-09-01 RX ORDER — LISDEXAMFETAMINE DIMESYLATE 50 MG/1
50 CAPSULE ORAL EVERY MORNING
Qty: 30 CAPSULE | Refills: 0 | Status: SHIPPED | OUTPATIENT
Start: 2023-09-01

## 2023-09-01 NOTE — TELEPHONE ENCOUNTER
No care due was identified.  Health [facility] Embedded Care Due Messages. Reference number: [id number].   9/01/2023 11:07:06 AM CDT

## 2023-09-18 RX ORDER — TIZANIDINE 4 MG/1
TABLET ORAL
Qty: 30 TABLET | Refills: 1 | Status: SHIPPED | OUTPATIENT
Start: 2023-09-18 | End: 2023-11-13

## 2023-09-18 NOTE — TELEPHONE ENCOUNTER
No care due was identified.  [facility] Embedded Care Due Messages. Reference number: [id number].   9/18/2023 7:48:35 AM CDT

## 2023-10-19 ENCOUNTER — OFFICE VISIT (OUTPATIENT)
Dept: FAMILY MEDICINE | Facility: CLINIC | Age: 48
End: 2023-10-19
Payer: COMMERCIAL

## 2023-10-19 DIAGNOSIS — F90.9 ADULT ADHD: Primary | ICD-10-CM

## 2023-10-19 DIAGNOSIS — M54.2 NECK PAIN: ICD-10-CM

## 2023-10-19 PROCEDURE — 99214 OFFICE O/P EST MOD 30 MIN: CPT | Mod: 95,,, | Performed by: FAMILY MEDICINE

## 2023-10-19 PROCEDURE — 99214 PR OFFICE/OUTPT VISIT, EST, LEVL IV, 30-39 MIN: ICD-10-PCS | Mod: 95,,, | Performed by: FAMILY MEDICINE

## 2023-10-19 RX ORDER — MELOXICAM 7.5 MG/1
7.5 TABLET ORAL DAILY
Qty: 30 TABLET | Refills: 4 | Status: SHIPPED | OUTPATIENT
Start: 2023-10-19 | End: 2024-02-12

## 2023-10-19 NOTE — PROGRESS NOTES
Chief Complaint:  No chief complaint on file.      History of Present Illness:    Patient presents today for medication refill,    Doing well, compliant with medication. Vyvanse, would like to retry 40 mg because it does not cause dry mouth.    Chronic neck pain on zanaflex, asking about ibuprofen to take during the day.    Due for colon cancer screening.    ROS:  Review of Systems   Constitutional:  Negative for activity change and unexpected weight change.   HENT:  Negative for hearing loss, rhinorrhea and trouble swallowing.    Eyes:  Negative for discharge and visual disturbance.   Respiratory:  Negative for chest tightness and wheezing.    Cardiovascular:  Negative for chest pain and palpitations.   Gastrointestinal:  Negative for blood in stool, constipation, diarrhea and vomiting.   Endocrine: Negative for polydipsia and polyuria.   Genitourinary:  Negative for difficulty urinating, dysuria, hematuria and menstrual problem.   Musculoskeletal:  Negative for arthralgias, joint swelling and neck pain.   Neurological:  Negative for weakness and headaches.   Psychiatric/Behavioral:  Negative for confusion and dysphoric mood.        Past Medical History:   Diagnosis Date    DDD (degenerative disc disease), lumbar     Fatty liver     Nicotine abuse        Social History:  Social History     Socioeconomic History    Marital status:    Occupational History     Employer: homemaker   Tobacco Use    Smoking status: Former     Current packs/day: 0.00     Types: Cigarettes     Quit date: 10/7/2015     Years since quittin.0    Smokeless tobacco: Never   Substance and Sexual Activity    Alcohol use: Yes     Alcohol/week: 0.0 standard drinks of alcohol     Comment: occasionally     Drug use: No    Sexual activity: Yes     Partners: Male     Birth control/protection: None     Social Determinants of Health     Financial Resource Strain: Unknown (2/10/2023)    [facility]      Difficulty of Paying Living Expenses: Patient refused   Food Insecurity: Unknown (2/10/2023)    Hunger Vital Sign     Worried About Running Out of Food in the Last Year: Patient refused     Ran Out of Food in the Last Year: Patient refused   Transportation Needs: Unknown (2/10/2023)    PRAPARE - Transportation     Lack of Transportation (Medical): Patient refused     Lack of Transportation (Non-Medical): Patient refused   Physical Activity: Insufficiently Active (7/24/2023)    Exercise Vital Sign     Days of Exercise per Week: 2 days     Minutes of Exercise per Session: 60 min   Stress: Unknown (2/10/2023)    [employer] [place] [employer] - Occupational Stress Questionnaire     Feeling of Stress : Patient refused   Social Connections: Unknown (7/24/2023)    Social Connection and Isolation Panel [NHANES]     Frequency of Communication with Friends and Family: Patient refused     Frequency of Social Gatherings with Friends and Family: Patient refused     Active Member of Clubs or Organizations: Patient refused     Attends Club or Organization Meetings: Patient refused     Marital Status: Patient refused   Housing Stability: Unknown (7/24/2023)    Housing Stability Vital Sign     Unable to Pay for Housing in the Last Year: Patient refused     Unstable Housing in the Last Year: Patient refused       Family History:   family history includes Breast cancer in her maternal grandmother; Breast cancer (age of onset: 50) in her mother; Cancer in her maternal grandmother; Diabetes in her cousin; Heart attack in her maternal grandmother; Hypertension in her maternal grandmother.    Health Maintenance   Topic Date Due    Lipid Panel  11/06/2022    Colorectal Cancer Screening  02/01/2023    TETANUS VACCINE  03/15/2023    Mammogram  04/05/2024    Hepatitis C Screening  Completed       Exam:Physical      ]    There is no height or weight on file to calculate BMI.    Physical Exam      Assessment:      ICD-10-CM ICD-9-CM    1. Adult ADHD  F90.9 314.01   2. Neck pain  M54.2 723.1         Plan:    Will try the rest of her 40 mg Vyvanse to see how she does on it.   Will message next week to see if she would like to continue on 40 mg.   Start Meloxicam, take as needed with food.    Try to complete Cologuard.  Next visit will be in person.    The patient location is: Home   The chief complaint leading to consultation is: as below  Visit type: Virtual visit with synchronous audio and video  Total time spent with patient: 15+ mins  Each patient to whom he or she provides medical services by telemedicine is:  (1) informed of the relationship between the physician and patient and the respective role of any other health care provider with respect to management of the patient; and (2) notified that he or she may decline to receive medical services by telemedicine and may withdraw from such care at any time.    Notes: see below   Orders Placed This Encounter    meloxicam (MOBIC) 7.5 MG tablet      Current Outpatient Medications   Medication Sig Dispense Refill    azelastine (ASTELIN) 137 mcg (0.1 %) nasal spray 1 spray (137 mcg total) by Nasal route 2 (two) times daily. 30 mL 0    Lactobac no.41/Bifidobact no.7 (PROBIOTIC-10 ORAL) Take by mouth.      lisdexamfetamine (VYVANSE) 50 MG capsule Take 1 capsule (50 mg total) by mouth every morning. 30 capsule 0    lisdexamfetamine (VYVANSE) 70 MG capsule Take 1 capsule (70 mg total) by mouth every morning. 30 capsule 0    lisdexamfetamine (VYVANSE) 70 MG capsule Take 1 capsule (70 mg total) by mouth every morning. 30 capsule 0    meloxicam (MOBIC) 7.5 MG tablet Take 1 tablet (7.5 mg total) by mouth once daily. 30 tablet 4    rOPINIRole (REQUIP) 0.5 MG tablet TAKE ONE TABLET BY MOUTH EVERY EVENING 30 tablet 11    tiZANidine (ZANAFLEX) 4 MG tablet TAKE ONE TABLET BY MOUTH EVERY 8 HOURS 30 tablet 1    valACYclovir (VALTREX) 1000 MG tablet Take 1 tablet by mouth once daily.       No current  facility-administered medications for this visit.      There are no discontinued medications.   No follow-ups on file.      Jennifer Summers MD    Scribe Attestation:   I, Gregorio Rojo, am scribing for, and in the presence of, Dr.Arif Summers I performed the above scribed service and the documentation accurately describes the services I performed. I attest to the accuracy of the note.    I, Dr. Jennifer Summers, reviewed documentation as scribed above. I performed the services described in this documentation.  I agree that the record reflects my personal performance and is accurate and complete. Jennifer Summers MD.  10/19/2023

## 2023-10-23 ENCOUNTER — PATIENT MESSAGE (OUTPATIENT)
Dept: FAMILY MEDICINE | Facility: CLINIC | Age: 48
End: 2023-10-23
Payer: COMMERCIAL

## 2023-10-23 DIAGNOSIS — E04.1 THYROID NODULE: Primary | ICD-10-CM

## 2023-10-25 RX ORDER — LISDEXAMFETAMINE DIMESYLATE 70 MG/1
70 CAPSULE ORAL EVERY MORNING
Qty: 30 CAPSULE | Refills: 0 | Status: SHIPPED | OUTPATIENT
Start: 2023-10-25

## 2023-10-25 NOTE — TELEPHONE ENCOUNTER
No care due was identified.  [facility] Embedded Care Due Messages. Reference number: [id number].   10/25/2023 1:23:11 PM CDT

## 2023-10-27 ENCOUNTER — APPOINTMENT (OUTPATIENT)
Dept: RADIOLOGY | Facility: HOSPITAL | Age: 48
End: 2023-10-27
Attending: FAMILY MEDICINE
Payer: COMMERCIAL

## 2023-10-27 DIAGNOSIS — E04.1 THYROID NODULE: ICD-10-CM

## 2023-10-27 PROCEDURE — 76536 US EXAM OF HEAD AND NECK: CPT | Mod: 26,,, | Performed by: RADIOLOGY

## 2023-10-27 PROCEDURE — 76536 US EXAM OF HEAD AND NECK: CPT | Mod: TC,PO

## 2023-10-27 PROCEDURE — 76536 US THYROID: ICD-10-PCS | Mod: 26,,, | Performed by: RADIOLOGY

## 2023-10-29 DIAGNOSIS — E04.1 THYROID NODULE: Primary | ICD-10-CM

## 2023-10-30 ENCOUNTER — TELEPHONE (OUTPATIENT)
Dept: FAMILY MEDICINE | Facility: CLINIC | Age: 48
End: 2023-10-30
Payer: COMMERCIAL

## 2023-10-30 ENCOUNTER — PATIENT MESSAGE (OUTPATIENT)
Dept: FAMILY MEDICINE | Facility: CLINIC | Age: 48
End: 2023-10-30
Payer: COMMERCIAL

## 2023-10-30 DIAGNOSIS — E04.1 THYROID NODULE: Primary | ICD-10-CM

## 2023-11-01 ENCOUNTER — HOSPITAL ENCOUNTER (OUTPATIENT)
Dept: RADIOLOGY | Facility: HOSPITAL | Age: 48
Discharge: HOME | End: 2023-11-01
Attending: FAMILY MEDICINE
Payer: COMMERCIAL

## 2023-11-01 ENCOUNTER — PATIENT MESSAGE (OUTPATIENT)
Dept: FAMILY MEDICINE | Facility: CLINIC | Age: 48
End: 2023-11-01
Payer: COMMERCIAL

## 2023-11-01 DIAGNOSIS — M79.89 SWELLING OF LEFT FOOT: ICD-10-CM

## 2023-11-01 PROCEDURE — 73630 XR FOOT COMPLETE 3 VIEW LEFT: ICD-10-PCS | Mod: 26,LT,, | Performed by: RADIOLOGY

## 2023-11-01 PROCEDURE — 73630 X-RAY EXAM OF FOOT: CPT | Mod: TC,LT

## 2023-11-01 PROCEDURE — 73630 X-RAY EXAM OF FOOT: CPT | Mod: 26,LT,, | Performed by: RADIOLOGY

## 2023-11-09 NOTE — TELEPHONE ENCOUNTER
"There was an email sent about ADHD eval. You sent her a message that we would contact her about an eval , you put by "our" psychologist . Who do you want her to go to?   "
Faxed to Neuropsychology   
I mean that once we use [facility]  Please send fax them referral  
Class II - visualization of the soft palate, fauces, and uvula

## 2023-11-11 DIAGNOSIS — M79.672 LEFT FOOT PAIN: Primary | ICD-10-CM

## 2023-11-11 NOTE — TELEPHONE ENCOUNTER
No care due was identified.  [facility] Embedded Care Due Messages. Reference number: [id number].   11/11/2023 10:17:31 AM CST

## 2023-11-13 RX ORDER — TIZANIDINE 4 MG/1
TABLET ORAL
Qty: 30 TABLET | Refills: 1 | Status: SHIPPED | OUTPATIENT
Start: 2023-11-13 | End: 2024-01-09

## 2023-11-15 ENCOUNTER — OFFICE VISIT (OUTPATIENT)
Dept: FAMILY MEDICINE | Facility: CLINIC | Age: 48
End: 2023-11-15
Payer: COMMERCIAL

## 2023-11-15 ENCOUNTER — HOSPITAL ENCOUNTER (OUTPATIENT)
Dept: RADIOLOGY | Facility: HOSPITAL | Age: 48
Discharge: HOME | End: 2023-11-15
Attending: FAMILY MEDICINE
Payer: COMMERCIAL

## 2023-11-15 VITALS
WEIGHT: 190.5 LBS | SYSTOLIC BLOOD PRESSURE: 120 MMHG | DIASTOLIC BLOOD PRESSURE: 86 MMHG | BODY MASS INDEX: 28.13 KG/M2 | OXYGEN SATURATION: 98 % | HEART RATE: 89 BPM

## 2023-11-15 DIAGNOSIS — Z98.84 HX OF LAPAROSCOPIC ADJUSTABLE GASTRIC BANDING: ICD-10-CM

## 2023-11-15 DIAGNOSIS — F90.9 ADULT ADHD: ICD-10-CM

## 2023-11-15 DIAGNOSIS — Z01.818 PREOP GENERAL PHYSICAL EXAM: Primary | ICD-10-CM

## 2023-11-15 DIAGNOSIS — Z13.220 LIPID SCREENING: ICD-10-CM

## 2023-11-15 DIAGNOSIS — Z01.818 PREOP GENERAL PHYSICAL EXAM: ICD-10-CM

## 2023-11-15 PROCEDURE — 93010 EKG 12-LEAD: ICD-10-PCS | Mod: S$GLB,,, | Performed by: INTERNAL MEDICINE

## 2023-11-15 PROCEDURE — 93005 ELECTROCARDIOGRAM TRACING: CPT | Mod: S$GLB,,, | Performed by: FAMILY MEDICINE

## 2023-11-15 PROCEDURE — 3079F DIAST BP 80-89 MM HG: CPT | Mod: CPTII,S$GLB,, | Performed by: FAMILY MEDICINE

## 2023-11-15 PROCEDURE — 3079F PR MOST RECENT DIASTOLIC BLOOD PRESSURE 80-89 MM HG: ICD-10-PCS | Mod: CPTII,S$GLB,, | Performed by: FAMILY MEDICINE

## 2023-11-15 PROCEDURE — 3008F PR BODY MASS INDEX (BMI) DOCUMENTED: ICD-10-PCS | Mod: CPTII,S$GLB,, | Performed by: FAMILY MEDICINE

## 2023-11-15 PROCEDURE — 93010 ELECTROCARDIOGRAM REPORT: CPT | Mod: S$GLB,,, | Performed by: INTERNAL MEDICINE

## 2023-11-15 PROCEDURE — 1159F MED LIST DOCD IN RCRD: CPT | Mod: CPTII,S$GLB,, | Performed by: FAMILY MEDICINE

## 2023-11-15 PROCEDURE — 3008F BODY MASS INDEX DOCD: CPT | Mod: CPTII,S$GLB,, | Performed by: FAMILY MEDICINE

## 2023-11-15 PROCEDURE — 3074F SYST BP LT 130 MM HG: CPT | Mod: CPTII,S$GLB,, | Performed by: FAMILY MEDICINE

## 2023-11-15 PROCEDURE — 99213 PR OFFICE/OUTPT VISIT, EST, LEVL III, 20-29 MIN: ICD-10-PCS | Mod: S$GLB,,, | Performed by: FAMILY MEDICINE

## 2023-11-15 PROCEDURE — 99213 OFFICE O/P EST LOW 20 MIN: CPT | Mod: S$GLB,,, | Performed by: FAMILY MEDICINE

## 2023-11-15 PROCEDURE — 99999 PR PBB SHADOW E&M-EST. PATIENT-LVL III: CPT | Mod: PBBFAC,,, | Performed by: FAMILY MEDICINE

## 2023-11-15 PROCEDURE — 3074F PR MOST RECENT SYSTOLIC BLOOD PRESSURE < 130 MM HG: ICD-10-PCS | Mod: CPTII,S$GLB,, | Performed by: FAMILY MEDICINE

## 2023-11-15 PROCEDURE — 71046 X-RAY EXAM CHEST 2 VIEWS: CPT | Mod: 26,,, | Performed by: RADIOLOGY

## 2023-11-15 PROCEDURE — 1159F PR MEDICATION LIST DOCUMENTED IN MEDICAL RECORD: ICD-10-PCS | Mod: CPTII,S$GLB,, | Performed by: FAMILY MEDICINE

## 2023-11-15 PROCEDURE — 99999 PR PBB SHADOW E&M-EST. PATIENT-LVL III: ICD-10-PCS | Mod: PBBFAC,,, | Performed by: FAMILY MEDICINE

## 2023-11-15 PROCEDURE — 71046 XR CHEST PA AND LATERAL: ICD-10-PCS | Mod: 26,,, | Performed by: RADIOLOGY

## 2023-11-15 PROCEDURE — 93005 EKG 12-LEAD: ICD-10-PCS | Mod: S$GLB,,, | Performed by: FAMILY MEDICINE

## 2023-11-15 PROCEDURE — 71046 X-RAY EXAM CHEST 2 VIEWS: CPT | Mod: TC,PO

## 2023-11-15 RX ORDER — FLUTICASONE PROPIONATE 50 MCG
2 SPRAY, SUSPENSION (ML) NASAL DAILY
Qty: 16 G | Refills: 11 | Status: SHIPPED | OUTPATIENT
Start: 2023-11-15

## 2023-11-15 NOTE — PROGRESS NOTES
Chief Complaint:    Chief Complaint   Patient presents with    Follow-up     Pre op testing       History of Present Illness:    Patient presents today for pre op exam,    Thyroid biopsy that came back non diagnosed. Dr. Martin with [facility].    ADHD on Vyvanse. Zanaflex for chronic neck pain.     This patient is scheduled to have a [facility] on TBD [facility]. Fax surgical clearance form to [phone].     Due for colon cancer screening, has cologuard at home.     ROS:  Review of Systems   Constitutional:  Negative for activity change, appetite change, chills, fever and unexpected weight change.   HENT:  Negative for congestion, ear pain, hearing loss, postnasal drip, rhinorrhea, sinus pressure, sinus pain and trouble swallowing.    Eyes:  Negative for pain, discharge and visual disturbance.   Respiratory:  Negative for cough, chest tightness, shortness of breath and wheezing.    Cardiovascular:  Negative for chest pain and palpitations.   Gastrointestinal:  Negative for abdominal pain, blood in stool, constipation, diarrhea, nausea and vomiting.   Endocrine: Negative for polydipsia and polyuria.   Genitourinary:  Negative for difficulty urinating, dysuria, flank pain, hematuria and menstrual problem.   Musculoskeletal:  Negative for arthralgias, back pain, joint swelling, myalgias and neck pain.   Skin:  Negative for pallor and wound.   Neurological:  Negative for dizziness, tremors, speech difficulty, weakness, light-headedness and headaches.   Psychiatric/Behavioral:  Negative for behavioral problems, confusion, dysphoric mood and sleep disturbance.    All other systems reviewed and are negative.      Past Medical History:   Diagnosis Date    DDD (degenerative disc disease), lumbar     Fatty liver     Nicotine abuse        Social History:  Social History     Socioeconomic History    Marital status:    Occupational History      Employer: homemaker   Tobacco Use    Smoking status: Former     Current packs/day: 0.00     Types: Cigarettes     Quit date: 10/7/2015     Years since quittin.1    Smokeless tobacco: Never   Substance and Sexual Activity    Alcohol use: Yes     Alcohol/week: 0.0 standard drinks of alcohol     Comment: occasionally     Drug use: No    Sexual activity: Yes     Partners: Male     Birth control/protection: None     Social Determinants of Health     Financial Resource Strain: Unknown (11/15/2023)    Overall Financial Resource Strain ([facility])     Difficulty of Paying Living Expenses: Patient refused   Food Insecurity: Unknown (11/15/2023)    Hunger Vital Sign     Worried About Running Out of Food in the Last Year: Patient refused     Ran Out of Food in the Last Year: Patient refused   Transportation Needs: Unknown (11/15/2023)    PRAPARE - Transportation     Lack of Transportation (Medical): Patient refused     Lack of Transportation (Non-Medical): Patient refused   Physical Activity: Unknown (11/15/2023)    Exercise Vital Sign     Days of Exercise per Week: Patient refused     Minutes of Exercise per Session: 60 min   Stress: Unknown (11/15/2023)    [employer] [place] [employer] - Occupational Stress Questionnaire     Feeling of Stress : Patient refused   Social Connections: Unknown (11/15/2023)    Social Connection and Isolation Panel [NHANES]     Frequency of Communication with Friends and Family: Patient refused     Frequency of Social Gatherings with Friends and Family: Patient refused     Active Member of Clubs or Organizations: Patient refused     Attends Club or Organization Meetings: Patient refused     Marital Status:    Housing Stability: Unknown (11/15/2023)    Housing Stability Vital Sign     Unable to Pay for Housing in the Last Year: Patient refused     Unstable Housing in the Last Year: Patient refused       Family History:   family history includes Breast cancer in her maternal  grandmother; Breast cancer (age of onset: 50) in her mother; Cancer in her maternal grandmother; Diabetes in her cousin; Heart attack in her maternal grandmother; Hypertension in her maternal grandmother.    Health Maintenance   Topic Date Due    Lipid Panel  11/06/2022    Colorectal Cancer Screening  02/01/2023    TETANUS VACCINE  11/15/2024 (Originally 3/15/2023)    Mammogram  04/05/2024    Hepatitis C Screening  Completed       Exam:Physical     Vital Signs  Pulse: 89  SpO2: 98 %  BP: 120/86  BP Location: Left arm  Patient Position: Sitting  Pain Score: 0-No pain  Height and Weight  Weight: 86.4 kg (190 lb 7.6 oz)]    Body mass index is 28.13 kg/m².    Physical Exam  Vitals and nursing note reviewed.   Constitutional:       Appearance: Normal appearance. She is not toxic-appearing.   HENT:      Head: Normocephalic and atraumatic.      Right Ear: Tympanic membrane normal.      Left Ear: Tympanic membrane normal.   Eyes:      Extraocular Movements: Extraocular movements intact.      Pupils: Pupils are equal, round, and reactive to light.   Cardiovascular:      Rate and Rhythm: Normal rate and regular rhythm.      Heart sounds: Normal heart sounds.   Pulmonary:      Effort: Pulmonary effort is normal.      Breath sounds: Normal breath sounds. No wheezing, rhonchi or rales.   Abdominal:      General: Bowel sounds are normal. There is no distension.      Palpations: Abdomen is soft.      Tenderness: There is no abdominal tenderness.   Musculoskeletal:         General: Normal range of motion.      Cervical back: Normal range of motion.      Lumbar back: No tenderness.   Skin:     General: Skin is warm and dry.      Capillary Refill: Capillary refill takes less than 2 seconds.   Neurological:      General: No focal deficit present.      Mental Status: She is alert and oriented to person, place, and time.   Psychiatric:         Mood and Affect: Mood normal.         Behavior: Behavior normal.         Judgment: Judgment  normal.           Assessment:      ICD-10-CM ICD-9-CM   1. Preop general physical exam  Z01.818 V72.83   2. Adult ADHD  F90.9 314.01   3. Hx of laparoscopic adjustable gastric banding  Z98.84 V45.86   4. Lipid screening  Z13.220 V77.91           Plan:  Please proceed as planned surgical risk is low under general anesthesia.  Order CXR, EKG. Labs below.  Routine perioperative care is advised.    This patient is scheduled to have a Thyroidectomy on TBD [facility]. Fax surgical clearance form to [phone].     Please mail the Cologuard back.    Patient refused age-appropriate immunizations    Orders Placed This Encounter    X-Ray Chest PA And Lateral    CBC Auto Differential    Comprehensive Metabolic Panel    Lipid Panel    EKG 12-lead        Current Outpatient Medications   Medication Sig Dispense Refill    Lactobac no.41/Bifidobact no.7 (PROBIOTIC-10 ORAL) Take by mouth.      lisdexamfetamine (VYVANSE) 50 MG capsule Take 1 capsule (50 mg total) by mouth every morning. 30 capsule 0    lisdexamfetamine (VYVANSE) 70 MG capsule Take 1 capsule (70 mg total) by mouth every morning. 30 capsule 0    lisdexamfetamine (VYVANSE) 70 MG capsule Take 1 capsule (70 mg total) by mouth every morning. 30 capsule 0    meloxicam (MOBIC) 7.5 MG tablet Take 1 tablet (7.5 mg total) by mouth once daily. 30 tablet 4    rOPINIRole (REQUIP) 0.5 MG tablet TAKE ONE TABLET BY MOUTH EVERY EVENING 30 tablet 11    tiZANidine (ZANAFLEX) 4 MG tablet TAKE ONE TABLET BY MOUTH EVERY 8 HOURS 30 tablet 1    valACYclovir (VALTREX) 1000 MG tablet Take 1 tablet by mouth once daily.      VYVANSE 70 mg capsule TAKE ONE CAPSULE BY MOUTH EVERY MORNING 30 capsule 0    azelastine (ASTELIN) 137 mcg (0.1 %) nasal spray 1 spray (137 mcg total) by Nasal route 2 (two) times daily. 30 mL 0     No current facility-administered medications for this visit.      There are no discontinued medications.    No follow-ups on file.      Jennifer Summers MD    Scribe Attestation:   I, Gregorio Rojo, am scribing for, and in the presence of, Dr.Arif Summers I performed the above scribed service and the documentation accurately describes the services I performed. I attest to the accuracy of the note.    I, Dr. Jennifer Summers, reviewed documentation as scribed above. I performed the services described in this documentation.  I agree that the record reflects my personal performance and is accurate and complete. Jennifer Summers MD.  11/15/2023

## 2023-11-18 DIAGNOSIS — F90.9 ADULT ADHD: ICD-10-CM

## 2023-11-18 NOTE — TELEPHONE ENCOUNTER
Refill Routing Note   Medication(s) are not appropriate for processing by [facility] for the following reason(s):        Outside of protocol    [facility] action(s):  Route               Appointments  past 12m or future 3m with PCP    Date Provider   Last Visit   11/15/2023 Jennifer Summers MD   Next Visit   Visit date not found Jennifer Summers MD   ED visits in past 90 days: 0        Note composed:4:02 PM 11/18/2023

## 2023-11-18 NOTE — TELEPHONE ENCOUNTER
No care due was identified.  Health [facility] Embedded Care Due Messages. Reference number: [id number].   11/18/2023 8:05:08 AM CST

## 2023-11-19 RX ORDER — LISDEXAMFETAMINE DIMESYLATE 70 MG/1
70 CAPSULE ORAL EVERY MORNING
Qty: 30 CAPSULE | Refills: 0 | Status: SHIPPED | OUTPATIENT
Start: 2023-11-19 | End: 2023-12-18

## 2023-12-11 ENCOUNTER — PATIENT MESSAGE (OUTPATIENT)
Dept: FAMILY MEDICINE | Facility: CLINIC | Age: 48
End: 2023-12-11
Payer: COMMERCIAL

## 2023-12-17 DIAGNOSIS — F90.9 ADULT ADHD: ICD-10-CM

## 2023-12-18 RX ORDER — LISDEXAMFETAMINE DIMESYLATE 70 MG/1
70 CAPSULE ORAL
Qty: 30 CAPSULE | Refills: 0 | Status: SHIPPED | OUTPATIENT
Start: 2023-12-18 | End: 2024-01-16

## 2024-01-09 DIAGNOSIS — M79.672 LEFT FOOT PAIN: ICD-10-CM

## 2024-01-09 RX ORDER — TIZANIDINE 4 MG/1
TABLET ORAL
Qty: 30 TABLET | Refills: 1 | Status: SHIPPED | OUTPATIENT
Start: 2024-01-09

## 2024-01-09 NOTE — TELEPHONE ENCOUNTER
No care due was identified.  Health [facility] Embedded Care Due Messages. Reference number: [id number].   1/09/2024 1:07:00 PM CST

## 2024-01-14 DIAGNOSIS — F90.9 ADULT ADHD: ICD-10-CM

## 2024-01-14 NOTE — TELEPHONE ENCOUNTER
No care due was identified.  [facility] Embedded Care Due Messages. Reference number: [id number].   1/14/2024 8:05:05 AM CST

## 2024-01-16 RX ORDER — LISDEXAMFETAMINE DIMESYLATE 70 MG/1
70 CAPSULE ORAL EVERY MORNING
Qty: 30 CAPSULE | Refills: 0 | Status: SHIPPED | OUTPATIENT
Start: 2024-01-16 | End: 2024-02-15

## 2024-02-12 DIAGNOSIS — M54.2 NECK PAIN: Primary | ICD-10-CM

## 2024-02-12 RX ORDER — MELOXICAM 7.5 MG/1
7.5 TABLET ORAL
Qty: 30 TABLET | Refills: 4 | Status: SHIPPED | OUTPATIENT
Start: 2024-02-12

## 2024-02-12 NOTE — TELEPHONE ENCOUNTER
No care due was identified.  Health [facility] Embedded Care Due Messages. Reference number: [id number].   2/12/2024 8:06:09 AM CST

## 2024-02-15 DIAGNOSIS — F90.9 ADULT ADHD: ICD-10-CM

## 2024-02-15 RX ORDER — LISDEXAMFETAMINE DIMESYLATE 70 MG/1
70 CAPSULE ORAL EVERY MORNING
Qty: 30 CAPSULE | Refills: 0 | Status: SHIPPED | OUTPATIENT
Start: 2024-02-15 | End: 2024-03-15

## 2024-02-15 NOTE — TELEPHONE ENCOUNTER
No care due was identified.  Health [facility] Embedded Care Due Messages. Reference number: [id number].   2/15/2024 8:02:29 AM CST

## 2024-03-15 DIAGNOSIS — F90.9 ADULT ADHD: ICD-10-CM

## 2024-03-15 RX ORDER — LISDEXAMFETAMINE DIMESYLATE 70 MG/1
70 CAPSULE ORAL EVERY MORNING
Qty: 30 CAPSULE | Refills: 0 | Status: SHIPPED | OUTPATIENT
Start: 2024-03-15 | End: 2024-04-29 | Stop reason: SDUPTHER

## 2024-03-15 NOTE — TELEPHONE ENCOUNTER
Unable to retrieve patient chart and identify care due.  [facility] Embedded Care Due Messages. Reference number: [id number].   3/15/2024 8:05:59 AM CDT

## 2024-04-13 DIAGNOSIS — F90.9 ADULT ADHD: ICD-10-CM

## 2024-04-13 NOTE — TELEPHONE ENCOUNTER
No care due was identified.  Health [facility] Embedded Care Due Messages. Reference number: [id number].   4/13/2024 8:04:44 AM CDT   133

## 2024-04-15 ENCOUNTER — PATIENT MESSAGE (OUTPATIENT)
Dept: FAMILY MEDICINE | Facility: CLINIC | Age: 49
End: 2024-04-15
Payer: COMMERCIAL

## 2024-04-15 RX ORDER — LISDEXAMFETAMINE DIMESYLATE 70 MG/1
70 CAPSULE ORAL EVERY MORNING
Qty: 30 CAPSULE | Refills: 0 | OUTPATIENT
Start: 2024-04-15

## 2024-04-17 DIAGNOSIS — F90.9 ADULT ADHD: ICD-10-CM

## 2024-04-17 RX ORDER — LISDEXAMFETAMINE DIMESYLATE 70 MG/1
70 CAPSULE ORAL EVERY MORNING
Qty: 30 CAPSULE | Refills: 0 | OUTPATIENT
Start: 2024-04-17

## 2024-04-17 NOTE — TELEPHONE ENCOUNTER
No care due was identified.  [facility] Embedded Care Due Messages. Reference number: [id number].   4/17/2024 1:15:23 PM CDT

## 2024-04-29 ENCOUNTER — OFFICE VISIT (OUTPATIENT)
Dept: FAMILY MEDICINE | Facility: CLINIC | Age: 49
End: 2024-04-29
Payer: COMMERCIAL

## 2024-04-29 DIAGNOSIS — F90.9 ADULT ADHD: ICD-10-CM

## 2024-04-29 DIAGNOSIS — Z12.11 COLON CANCER SCREENING: Primary | ICD-10-CM

## 2024-04-29 PROCEDURE — 99213 OFFICE O/P EST LOW 20 MIN: CPT | Mod: 95,,, | Performed by: FAMILY MEDICINE

## 2024-04-29 RX ORDER — LISDEXAMFETAMINE DIMESYLATE 70 MG/1
70 CAPSULE ORAL EVERY MORNING
Qty: 30 CAPSULE | Refills: 0 | Status: SHIPPED | OUTPATIENT
Start: 2024-06-29

## 2024-04-29 RX ORDER — LISDEXAMFETAMINE DIMESYLATE 70 MG/1
70 CAPSULE ORAL EVERY MORNING
Qty: 30 CAPSULE | Refills: 0 | Status: SHIPPED | OUTPATIENT
Start: 2024-05-29

## 2024-04-29 RX ORDER — LISDEXAMFETAMINE DIMESYLATE 70 MG/1
70 CAPSULE ORAL EVERY MORNING
Qty: 30 CAPSULE | Refills: 0 | Status: SHIPPED | OUTPATIENT
Start: 2024-04-29 | End: 2024-05-03 | Stop reason: SDUPTHER

## 2024-04-29 NOTE — PROGRESS NOTES
Chief Complaint:  No chief complaint on file.      History of Present Illness:    Patient presents today for medication refill,    Doing well, compliant with medication. Vyvanse 70 mg    Chronic neck pain on zanaflex.    Due for colon cancer screening.    ROS:  Review of Systems   Constitutional:  Negative for activity change and unexpected weight change.   HENT:  Negative for hearing loss, rhinorrhea and trouble swallowing.    Eyes:  Negative for discharge and visual disturbance.   Respiratory:  Negative for chest tightness and wheezing.    Cardiovascular:  Negative for chest pain and palpitations.   Gastrointestinal:  Negative for blood in stool, constipation, diarrhea and vomiting.   Endocrine: Negative for polydipsia and polyuria.   Genitourinary:  Negative for difficulty urinating, dysuria, hematuria and menstrual problem.   Musculoskeletal:  Negative for arthralgias, joint swelling and neck pain.   Neurological:  Negative for weakness and headaches.   Psychiatric/Behavioral:  Negative for confusion and dysphoric mood.        Past Medical History:   Diagnosis Date    DDD (degenerative disc disease), lumbar     Fatty liver     Nicotine abuse        Social History:  Social History     Socioeconomic History    Marital status:    Occupational History     Employer: homemaker   Tobacco Use    Smoking status: Former     Current packs/day: 0.00     Types: Cigarettes     Quit date: 10/7/2015     Years since quittin.5    Smokeless tobacco: Never   Substance and Sexual Activity    Alcohol use: Yes     Alcohol/week: 0.0 standard drinks of alcohol     Comment: occasionally     Drug use: No    Sexual activity: Yes     Partners: Male     Birth control/protection: None     Social Determinants of Health     Financial Resource Strain: Patient Declined (11/15/2023)    Overall Financial Resource Strain (CARDIA)     Difficulty of Paying Living Expenses: Patient declined   Food Insecurity: Patient Declined (11/15/2023)     Hunger Vital Sign     Worried About Running Out of Food in the Last Year: Patient declined     Ran Out of Food in the Last Year: Patient declined   Transportation Needs: Patient Declined (11/15/2023)    PRAPARE - Transportation     Lack of Transportation (Medical): Patient declined     Lack of Transportation (Non-Medical): Patient declined   Physical Activity: Unknown (11/15/2023)    Exercise Vital Sign     Days of Exercise per Week: Patient declined   Stress: Patient Declined (11/15/2023)    [employer] - Occupational Stress Questionnaire     Feeling of Stress : Patient declined   Social Connections: Unknown (11/15/2023)    Social Connection and Isolation Panel [NHANES]     Frequency of Communication with Friends and Family: Patient declined     Frequency of Social Gatherings with Friends and Family: Patient declined     Active Member of Clubs or Organizations: Patient declined     Attends Club or Organization Meetings: Patient declined     Marital Status:    Housing Stability: Unknown (11/15/2023)    Housing Stability Vital Sign     Unable to Pay for Housing in the Last Year: Patient refused     Unstable Housing in the Last Year: Patient refused       Family History:   family history includes Breast cancer in her maternal grandmother; Breast cancer (age of onset: 50) in her mother; Cancer in her maternal grandmother; Diabetes in her cousin; Heart attack in her maternal grandmother; Hypertension in her maternal grandmother.    Health Maintenance   Topic Date Due    Colorectal Cancer Screening  01/18/2020    Mammogram  04/05/2024    TETANUS VACCINE  11/15/2024 (Originally 3/15/2023)    Lipid Panel  11/15/2028    Hepatitis C Screening  Completed       Exam:Physical      ]    There is no height or weight on file to calculate BMI.    Physical Exam      Assessment:      ICD-10-CM ICD-9-CM   1. Colon cancer screening  Z12.11 V76.51   2. Adult ADHD  F90.9 314.01            Plan:     check. Vyvanse refill    PT states will wait schedule colonoscopy. Discussed risk and benefits. Patient understands  Next visit will be in person.    The patient location is: Home   The chief complaint leading to consultation is: as below  Visit type: Virtual visit with synchronous audio and video  Total time spent with patient: 15+ mins  Each patient to whom he or she provides medical services by telemedicine is:  (1) informed of the relationship between the physician and patient and the respective role of any other health care provider with respect to management of the patient; and (2) notified that he or she may decline to receive medical services by telemedicine and may withdraw from such care at any time.    Notes: see below   Orders Placed This Encounter    lisdexamfetamine (VYVANSE) 70 MG capsule    lisdexamfetamine (VYVANSE) 70 MG capsule    lisdexamfetamine (VYVANSE) 70 MG capsule        Current Outpatient Medications   Medication Sig Dispense Refill    azelastine (ASTELIN) 137 mcg (0.1 %) nasal spray 1 spray (137 mcg total) by Nasal route 2 (two) times daily. 30 mL 0    fluticasone propionate (FLONASE) 50 mcg/actuation nasal spray 2 sprays (100 mcg total) by Each Nostril route once daily. 16 g 11    Lactobac no.41/Bifidobact no.7 (PROBIOTIC-10 ORAL) Take by mouth.      lisdexamfetamine (VYVANSE) 50 MG capsule Take 1 capsule (50 mg total) by mouth every morning. 30 capsule 0    [START ON 5/29/2024] lisdexamfetamine (VYVANSE) 70 MG capsule Take 1 capsule (70 mg total) by mouth every morning. 30 capsule 0    [START ON 6/29/2024] lisdexamfetamine (VYVANSE) 70 MG capsule Take 1 capsule (70 mg total) by mouth every morning. 30 capsule 0    lisdexamfetamine (VYVANSE) 70 MG capsule Take 1 capsule (70 mg total) by mouth every morning. 30 capsule 0    meloxicam (MOBIC) 7.5 MG tablet TAKE ONE TABLET BY MOUTH EVERY DAY 30 tablet 4    rOPINIRole (REQUIP) 0.5 MG tablet TAKE ONE TABLET BY MOUTH  EVERY EVENING 30 tablet 11    tiZANidine (ZANAFLEX) 4 MG tablet TAKE ONE TABLET BY MOUTH EVERY 8 HOURS 30 tablet 1    valACYclovir (VALTREX) 1000 MG tablet Take 1 tablet by mouth once daily.       No current facility-administered medications for this visit.      Medications Discontinued During This Encounter   Medication Reason    lisdexamfetamine (VYVANSE) 70 MG capsule Reorder    lisdexamfetamine (VYVANSE) 70 MG capsule Reorder    lisdexamfetamine (VYVANSE) 70 MG capsule Reorder      No follow-ups on file.      Jennifer Summers MD    Scribe Attestation:   I, rGegorio Rojo, am scribing for, and in the presence of, Dr.Arif Summers I performed the above scribed service and the documentation accurately describes the services I performed. I attest to the accuracy of the note.    I, Dr. Jennifer Summers, reviewed documentation as scribed above. I performed the services described in this documentation.  I agree that the record reflects my personal performance and is accurate and complete. Jennifer Summers MD.  04/29/2024

## 2024-04-30 ENCOUNTER — HOSPITAL ENCOUNTER (OUTPATIENT)
Dept: RADIOLOGY | Facility: HOSPITAL | Age: 49
Discharge: HOME | End: 2024-04-30
Attending: FAMILY MEDICINE
Payer: COMMERCIAL

## 2024-04-30 DIAGNOSIS — Z12.31 ENCOUNTER FOR SCREENING MAMMOGRAM FOR BREAST CANCER: ICD-10-CM

## 2024-04-30 PROCEDURE — 77067 SCR MAMMO BI INCL CAD: CPT | Mod: 26,,, | Performed by: RADIOLOGY

## 2024-04-30 PROCEDURE — 77067 SCR MAMMO BI INCL CAD: CPT | Mod: TC

## 2024-04-30 PROCEDURE — 77063 BREAST TOMOSYNTHESIS BI: CPT | Mod: 26,,, | Performed by: RADIOLOGY

## 2024-05-03 ENCOUNTER — PATIENT MESSAGE (OUTPATIENT)
Dept: FAMILY MEDICINE | Facility: CLINIC | Age: 49
End: 2024-05-03
Payer: COMMERCIAL

## 2024-05-03 DIAGNOSIS — F90.9 ADULT ADHD: ICD-10-CM

## 2024-05-03 RX ORDER — LISDEXAMFETAMINE DIMESYLATE 70 MG/1
70 CAPSULE ORAL EVERY MORNING
Qty: 30 CAPSULE | Refills: 0 | Status: SHIPPED | OUTPATIENT
Start: 2024-05-03 | End: 2024-05-27

## 2024-05-03 NOTE — TELEPHONE ENCOUNTER
No care due was identified.  [facility] Embedded Care Due Messages. Reference number: [id number].   5/03/2024 10:19:48 AM CDT

## 2024-05-26 DIAGNOSIS — F90.9 ADULT ADHD: ICD-10-CM

## 2024-05-26 NOTE — TELEPHONE ENCOUNTER
No care due was identified.  [facility] Embedded Care Due Messages. Reference number: [id number].   5/26/2024 8:05:34 AM CDT

## 2024-05-27 RX ORDER — LISDEXAMFETAMINE DIMESYLATE 70 MG/1
70 CAPSULE ORAL EVERY MORNING
Qty: 30 CAPSULE | Refills: 0 | Status: SHIPPED | OUTPATIENT
Start: 2024-05-27

## 2024-06-23 DIAGNOSIS — F90.9 ADULT ADHD: ICD-10-CM

## 2024-06-23 NOTE — TELEPHONE ENCOUNTER
No care due was identified.  [facility] Embedded Care Due Messages. Reference number: [id number].   6/23/2024 8:03:28 AM CDT

## 2024-06-24 RX ORDER — LISDEXAMFETAMINE DIMESYLATE 70 MG/1
70 CAPSULE ORAL EVERY MORNING
Qty: 30 CAPSULE | Refills: 0 | Status: SHIPPED | OUTPATIENT
Start: 2024-06-24

## 2024-07-01 ENCOUNTER — PATIENT MESSAGE (OUTPATIENT)
Dept: FAMILY MEDICINE | Facility: CLINIC | Age: 49
End: 2024-07-01

## 2024-07-02 ENCOUNTER — OFFICE VISIT (OUTPATIENT)
Dept: FAMILY MEDICINE | Facility: CLINIC | Age: 49
End: 2024-07-02
Payer: COMMERCIAL

## 2024-07-02 VITALS
SYSTOLIC BLOOD PRESSURE: 136 MMHG | HEIGHT: 69 IN | OXYGEN SATURATION: 97 % | HEART RATE: 98 BPM | WEIGHT: 199.06 LBS | TEMPERATURE: 97 F | BODY MASS INDEX: 29.48 KG/M2 | DIASTOLIC BLOOD PRESSURE: 82 MMHG

## 2024-07-02 DIAGNOSIS — K58.1 IRRITABLE BOWEL SYNDROME WITH CONSTIPATION: Primary | ICD-10-CM

## 2024-07-02 PROCEDURE — 3075F SYST BP GE 130 - 139MM HG: CPT | Mod: CPTII,S$GLB,, | Performed by: STUDENT IN AN ORGANIZED HEALTH CARE EDUCATION/TRAINING PROGRAM

## 2024-07-02 PROCEDURE — 3008F BODY MASS INDEX DOCD: CPT | Mod: CPTII,S$GLB,, | Performed by: STUDENT IN AN ORGANIZED HEALTH CARE EDUCATION/TRAINING PROGRAM

## 2024-07-02 PROCEDURE — 99999 PR PBB SHADOW E&M-EST. PATIENT-LVL IV: CPT | Mod: PBBFAC,,, | Performed by: STUDENT IN AN ORGANIZED HEALTH CARE EDUCATION/TRAINING PROGRAM

## 2024-07-02 PROCEDURE — 3079F DIAST BP 80-89 MM HG: CPT | Mod: CPTII,S$GLB,, | Performed by: STUDENT IN AN ORGANIZED HEALTH CARE EDUCATION/TRAINING PROGRAM

## 2024-07-02 PROCEDURE — 99214 OFFICE O/P EST MOD 30 MIN: CPT | Mod: S$GLB,,, | Performed by: STUDENT IN AN ORGANIZED HEALTH CARE EDUCATION/TRAINING PROGRAM

## 2024-07-02 PROCEDURE — 1159F MED LIST DOCD IN RCRD: CPT | Mod: CPTII,S$GLB,, | Performed by: STUDENT IN AN ORGANIZED HEALTH CARE EDUCATION/TRAINING PROGRAM

## 2024-07-02 RX ORDER — LEVOTHYROXINE SODIUM 75 UG/1
1 TABLET ORAL EVERY MORNING
COMMUNITY
Start: 2024-06-27

## 2024-07-02 NOTE — PROGRESS NOTES
"Same day clinic    Venice Orosco is a 49 y.o. year old White female  has a past medical history of DDD (degenerative disc disease), lumbar, Fatty liver, and Nicotine abuse.. Pt presented to the clinic for:  Constipation for the past 3 days.  Patient has a long-term history of constipation sometimes alternating with diarrhea.  She is tried various medications including laxatives, stool softeners including Colace, magnesium, lactulose.  She also takes enough fiber in her diet.  She is also tried colonic hydrotherapy but nothing seems to help her symptoms.  Patient is passing gas though.  Has a history of laparoscopic bending years ago      ROS: No fever, chills, cough, chest pain, shortness of breath, nausea, vomiting or diarrhea.      Vitals:    07/02/24 0942   BP: 136/82   BP Location: Right arm   Patient Position: Sitting   BP Method: Large (Manual)   Pulse: 98   Temp: 96.9 °F (36.1 °C)   TempSrc: Tympanic   SpO2: 97%   Weight: 90.3 kg (199 lb 1.2 oz)   Height: 5' 9" (1.753 m)       Body mass index is 29.4 kg/m².     PE:  General - Well developed, alert and oriented in NAD  HEENT - normocephalic, no evidence of trauma, sclera white, EOMI  Neck - full range of motion  COR - regular rate and rhythm without murmurs or gallops  Lungs - Clear  Abdomen - soft, non-tender  Ext - no cyanosis or edema    Lab Results   Component Value Date    WBC 7.91 11/15/2023    HGB 14.0 11/15/2023    HCT 41.7 11/15/2023    MCV 91 11/15/2023    MCH 30.7 11/15/2023    MCHC 33.6 11/15/2023    RDW 12.2 11/15/2023     11/15/2023    MPV 10.7 11/15/2023       Lab Results   Component Value Date     11/15/2023    K 4.8 11/15/2023     11/15/2023    CO2 31 (H) 11/15/2023    GLU 92 11/15/2023    BUN 11 11/15/2023    CALCIUM 9.4 11/15/2023    PROT 7.0 11/15/2023    ALBUMIN 4.1 11/15/2023    BILITOT 0.6 11/15/2023    AST 18 11/15/2023    ALKPHOS 69 11/15/2023    ALT 27 11/15/2023       Lab Results   Component Value Date " "   TRIG 106 11/15/2023    CHOL 232 (H) 11/15/2023    HDL 56 11/15/2023    LDLCALC 154.8 11/15/2023    CHOLHDL 24.1 11/15/2023           Lab Results   Component Value Date    HGBA1C 5.1 12/15/2016       No results found for: "MICROALBUR", "[id number]"      Impression:  1. Irritable bowel syndrome with constipation  linaCLOtide (LINZESS) 145 mcg Cap capsule    X-Ray Abdomen Flat And Erect    Ambulatory referral/consult to Gastroenterology      2. Thyroid nodule             Plan:  There is concern for irritable bowel syndrome-C.   Patient has a long-term history of constipation sometimes alternating with diarrhea.  She has tried various medications including laxatives, stool softeners including Colace, magnesium, lactulose.  She also takes enough fiber in her diet.  She is also tried colonic hydrotherapy but nothing seems to help her symptoms.  I will give a trial of Linzess.  X-ray ordered to see the stool burden.  Referral placed to gastroenterology.      Orders Placed This Encounter   Procedures    X-Ray Abdomen Flat And Erect    Ambulatory referral/consult to Gastroenterology       No follow-ups on file.     Jeanine Silva MD     I spent a total of 32 minutes on the day of the visit.This includes face to face time and non-face to face time preparing to see the patient (eg, review of tests), obtaining and/or reviewing separately obtained history, documenting clinical information in the electronic or other health record, independently interpreting results and communicating results to the patient/family/caregiver, or care coordinator.      "

## 2024-07-16 ENCOUNTER — PATIENT MESSAGE (OUTPATIENT)
Dept: GASTROENTEROLOGY | Facility: CLINIC | Age: 49
End: 2024-07-16
Payer: COMMERCIAL

## 2024-07-18 DIAGNOSIS — F90.9 ADULT ADHD: ICD-10-CM

## 2024-07-18 RX ORDER — LISDEXAMFETAMINE DIMESYLATE 70 MG/1
70 CAPSULE ORAL EVERY MORNING
Qty: 30 CAPSULE | Refills: 0 | Status: SHIPPED | OUTPATIENT
Start: 2024-07-18

## 2024-07-18 NOTE — TELEPHONE ENCOUNTER
No care due was identified.  [facility] Embedded Care Due Messages. Reference number: [id number].   7/18/2024 8:02:28 AM CDT

## 2024-07-31 DIAGNOSIS — K58.1 IRRITABLE BOWEL SYNDROME WITH CONSTIPATION: ICD-10-CM

## 2024-07-31 NOTE — TELEPHONE ENCOUNTER
No care due was identified.  [facility] Embedded Care Due Messages. Reference number: [id number].   7/31/2024 11:50:54 AM CDT

## 2024-08-01 ENCOUNTER — OFFICE VISIT (OUTPATIENT)
Dept: GASTROENTEROLOGY | Facility: CLINIC | Age: 49
End: 2024-08-01
Payer: COMMERCIAL

## 2024-08-01 VITALS — HEIGHT: 69 IN | BODY MASS INDEX: 29.48 KG/M2 | WEIGHT: 199.06 LBS

## 2024-08-01 DIAGNOSIS — R10.9 ABDOMINAL DISCOMFORT: ICD-10-CM

## 2024-08-01 DIAGNOSIS — K58.1 IRRITABLE BOWEL SYNDROME WITH CONSTIPATION: ICD-10-CM

## 2024-08-01 DIAGNOSIS — K59.04 CHRONIC IDIOPATHIC CONSTIPATION: Primary | ICD-10-CM

## 2024-08-01 PROCEDURE — 3008F BODY MASS INDEX DOCD: CPT | Mod: CPTII,95,, | Performed by: INTERNAL MEDICINE

## 2024-08-01 PROCEDURE — 99204 OFFICE O/P NEW MOD 45 MIN: CPT | Mod: 95,,, | Performed by: INTERNAL MEDICINE

## 2024-08-01 PROCEDURE — 1159F MED LIST DOCD IN RCRD: CPT | Mod: CPTII,95,, | Performed by: INTERNAL MEDICINE

## 2024-08-01 RX ORDER — LIFITEGRAST 50 MG/ML
SOLUTION/ DROPS OPHTHALMIC
COMMUNITY
Start: 2024-02-12

## 2024-08-01 RX ORDER — SODIUM, POTASSIUM,MAG SULFATES 17.5-3.13G
1 SOLUTION, RECONSTITUTED, ORAL ORAL DAILY
Qty: 1 EACH | Refills: 0 | Status: SHIPPED | OUTPATIENT
Start: 2024-08-01 | End: 2024-08-03

## 2024-08-01 NOTE — PROGRESS NOTES
[facility]  Gastroenterology      PCP: Jennifer Summers MD    8/1/24    HPI       Irritable Bowel Syndrome     Additional comments: Second opinion          Last edited by Nancy Snider MA on 8/1/2024  2:10 PM.      The patient location is: Home  The chief complaint leading to consultation is: IBS-Constipation    Visit type: audiovisual    Face to Face time with patient: 15 minutes of total time spent on the encounter, which includes face to face time and non-face to face time preparing to see the patient (eg, review of tests), Obtaining and/or reviewing separately obtained history, Documenting clinical information in the electronic or other health record, Independently interpreting results (not separately reported) and communicating results to the patient/family/caregiver, or Care coordination (not separately reported).         Each patient to whom he or she provides medical services by telemedicine is:  (1) informed of the relationship between the physician and patient and the respective role of any other health care provider with respect to management of the patient; and (2) notified that he or she may decline to receive medical services by telemedicine and may withdraw from such care at any time.    Notes:       Subjective:   Venice Orosco is a 49 y.o. female referred for evaluation of IBS-constipation. States she has had constipation for a long time. Has tried OTC laxatives and failed them. More recently she was started onto Linzess 145 mcg by her PCP which causes more of a liquid gritty output. This is similar to what she experienced with hydrotherapy in the past which would be followed by a large movement. No prior colonoscopy. Denies FH of colon cancer. Also gets abdominal discomfort trying to have a BM.       Past Medical History:   Diagnosis Date    DDD (degenerative disc disease), lumbar     Fatty liver     Nicotine abuse        Past Surgical History:   Procedure Laterality Date     AUGMENTATION OF BREAST      saline    BREAST SURGERY Bilateral     2010     SECTION      INJECTION OF ANESTHETIC AGENT INTO SACROILIAC JOINT Left 12/15/2021    Procedure: Left SIJ Injection and Benito trigger point injections to the cervical myofascial area;  Surgeon: Bon Valdez MD;  Location: [facility];  Service: Pain Management;  Laterality: Left;    LAPAROSCOPIC GASTRIC BANDING      LAPAROSCOPIC GASTRIC BANDING Left        Current Outpatient Medications on File Prior to Visit   Medication Sig Dispense Refill    azelastine (ASTELIN) 137 mcg (0.1 %) nasal spray 1 spray (137 mcg total) by Nasal route 2 (two) times daily. 30 mL 0    fluticasone propionate (FLONASE) 50 mcg/actuation nasal spray 2 sprays (100 mcg total) by Each Nostril route once daily. 16 g 11    Lactobac no.41/Bifidobact no.7 (PROBIOTIC-10 ORAL) Take by mouth.      levothyroxine (SYNTHROID) 75 MCG tablet Take 1 tablet by mouth every morning.      linaCLOtide (LINZESS) 145 mcg Cap capsule Take 1 capsule (145 mcg total) by mouth before breakfast. 30 capsule 5    lisdexamfetamine (VYVANSE) 50 MG capsule Take 1 capsule (50 mg total) by mouth every morning. 30 capsule 0    lisdexamfetamine (VYVANSE) 70 MG capsule Take 1 capsule (70 mg total) by mouth every morning. 30 capsule 0    lisdexamfetamine (VYVANSE) 70 MG capsule Take 1 capsule (70 mg total) by mouth every morning. 30 capsule 0    meloxicam (MOBIC) 7.5 MG tablet TAKE ONE TABLET BY MOUTH EVERY DAY 30 tablet 4    rOPINIRole (REQUIP) 0.5 MG tablet TAKE ONE TABLET BY MOUTH EVERY EVENING 30 tablet 11    tiZANidine (ZANAFLEX) 4 MG tablet TAKE ONE TABLET BY MOUTH EVERY 8 HOURS 30 tablet 1    valACYclovir (VALTREX) 1000 MG tablet Take 1 tablet by mouth once daily.      VYVANSE 70 mg capsule TAKE ONE CAPSULE BY MOUTH EVERY MORNING 30 capsule 0    XIIDRA 5 % Dpet        No current facility-administered medications on file prior to visit.       Review of patient's allergies  indicates:   Allergen Reactions    Penicillins Itching, Rash and Swelling       Social History     Socioeconomic History    Marital status:    Occupational History     Employer: homemaker   Tobacco Use    Smoking status: Former     Current packs/day: 0.00     Types: Cigarettes     Quit date: 10/7/2015     Years since quittin.8    Smokeless tobacco: Never   Substance and Sexual Activity    Alcohol use: Yes     Alcohol/week: 0.0 standard drinks of alcohol     Comment: occasionally     Drug use: No    Sexual activity: Yes     Partners: Male     Birth control/protection: None     Social Determinants of Health     Financial Resource Strain: Patient Declined (11/15/2023)    Overall Financial Resource Strain (CARDIA)     Difficulty of Paying Living Expenses: Patient declined   Food Insecurity: Patient Declined (11/15/2023)    Hunger Vital Sign     Worried About Running Out of Food in the Last Year: Patient declined     Ran Out of Food in the Last Year: Patient declined   Transportation Needs: Patient Declined (11/15/2023)    PRAPARE - Transportation     Lack of Transportation (Medical): Patient declined     Lack of Transportation (Non-Medical): Patient declined   Physical Activity: Unknown (11/15/2023)    Exercise Vital Sign     Days of Exercise per Week: Patient declined   Stress: Patient Declined (11/15/2023)    [employer] - Occupational Stress Questionnaire     Feeling of Stress : Patient declined   Housing Stability: Unknown (11/15/2023)    Housing Stability Vital Sign     Unable to Pay for Housing in the Last Year: Patient refused     Unstable Housing in the Last Year: Patient refused       Family History   Problem Relation Name Age of Onset    Breast cancer Mother  50    Breast cancer Maternal Aunt      Cancer Maternal Grandmother      Hypertension Maternal Grandmother      Heart attack Maternal Grandmother      Breast cancer Maternal Grandmother      Diabetes Cousin          Review of Systems   Constitutional:  Negative for appetite change, fever and unexpected weight change.   HENT:  Negative for postnasal drip, rhinorrhea, sneezing, sore throat and trouble swallowing.    Eyes:  Negative for visual disturbance.   Respiratory:  Negative for cough, shortness of breath and wheezing.    Cardiovascular:  Negative for chest pain, palpitations and leg swelling.   Gastrointestinal:  Positive for abdominal pain (discomfort) and constipation. Negative for blood in stool, diarrhea, nausea and vomiting.   Genitourinary:  Negative for dysuria.   Musculoskeletal:  Negative for arthralgias, joint swelling and myalgias.   Skin:  Negative for color change, pallor and rash.   Neurological:  Negative for weakness, light-headedness, numbness and headaches.   Hematological:  Negative for adenopathy. Does not bruise/bleed easily.   Psychiatric/Behavioral:  Negative for agitation.        Objective:   Vitals: There were no vitals filed for this visit.    Physical Exam Unable to perform due to video visit    IMPRESSION     Problem List Items Addressed This Visit    None  Visit Diagnoses       Chronic idiopathic constipation    -  Primary    Relevant Medications    sodium,potassium,mag sulfates (SUPREP BOWEL PREP KIT) 17.5-3.13-1.6 gram SolR    Other Relevant Orders    Ambulatory referral/consult to Endo Procedure     Irritable bowel syndrome with constipation        Abdominal discomfort                PLANS:    - Can continue with current dose of Linzess  - Due to failing multiple medications for constipation, recommend further evaluation with colonoscopy  - If still with issues despite this, would then recommend to proceed with anorectal manometry   - Will proceed with ordering colonoscopy with extended prep  - High fiber diet with plenty of fluid intake    Chronic idiopathic constipation  -     Ambulatory referral/consult to Endo Procedure ; Future; Expected date:  08/02/2024    Irritable bowel syndrome with constipation  -     Ambulatory referral/consult to Gastroenterology    Abdominal discomfort    Other orders  -     sodium,potassium,mag sulfates (SUPREP BOWEL PREP KIT) 17.5-3.13-1.6 gram SolR; Take 177 mLs by mouth once daily. for 2 days  Dispense: 1 each; Refill: 0        Christie Cooper MD  Gastroenterology

## 2024-08-02 ENCOUNTER — HOSPITAL ENCOUNTER (OUTPATIENT)
Dept: PREADMISSION TESTING | Facility: HOSPITAL | Age: 49
Discharge: HOME | End: 2024-08-02
Attending: INTERNAL MEDICINE
Payer: COMMERCIAL

## 2024-08-02 DIAGNOSIS — K59.04 CHRONIC IDIOPATHIC CONSTIPATION: Primary | ICD-10-CM

## 2024-08-12 ENCOUNTER — TELEPHONE (OUTPATIENT)
Dept: PREADMISSION TESTING | Facility: HOSPITAL | Age: 49
End: 2024-08-12
Payer: COMMERCIAL

## 2024-08-16 ENCOUNTER — ANESTHESIA (OUTPATIENT)
Dept: ENDOSCOPY | Facility: HOSPITAL | Age: 49
End: 2024-08-16
Payer: COMMERCIAL

## 2024-08-16 ENCOUNTER — HOSPITAL ENCOUNTER (OUTPATIENT)
Facility: HOSPITAL | Age: 49
Discharge: HOME | End: 2024-08-16
Attending: INTERNAL MEDICINE | Admitting: INTERNAL MEDICINE
Payer: COMMERCIAL

## 2024-08-16 ENCOUNTER — ANESTHESIA EVENT (OUTPATIENT)
Dept: ENDOSCOPY | Facility: HOSPITAL | Age: 49
End: 2024-08-16
Payer: COMMERCIAL

## 2024-08-16 DIAGNOSIS — K59.04 CHRONIC IDIOPATHIC CONSTIPATION: Primary | ICD-10-CM

## 2024-08-16 PROCEDURE — 37000008 HC ANESTHESIA 1ST 15 MINUTES: Performed by: INTERNAL MEDICINE

## 2024-08-16 PROCEDURE — 25000003 PHARM REV CODE 250: Performed by: INTERNAL MEDICINE

## 2024-08-16 PROCEDURE — 88305 TISSUE EXAM BY PATHOLOGIST: CPT | Performed by: PATHOLOGY

## 2024-08-16 PROCEDURE — 27201012 HC FORCEPS, HOT/COLD, DISP: Performed by: INTERNAL MEDICINE

## 2024-08-16 PROCEDURE — 37000009 HC ANESTHESIA EA ADD 15 MINS: Performed by: INTERNAL MEDICINE

## 2024-08-16 PROCEDURE — 63600175 PHARM REV CODE 636 W HCPCS: Performed by: NURSE ANESTHETIST, CERTIFIED REGISTERED

## 2024-08-16 PROCEDURE — 45380 COLONOSCOPY AND BIOPSY: CPT | Performed by: INTERNAL MEDICINE

## 2024-08-16 PROCEDURE — 45380 COLONOSCOPY AND BIOPSY: CPT | Mod: ,,, | Performed by: INTERNAL MEDICINE

## 2024-08-16 RX ORDER — DEXTROMETHORPHAN/PSEUDOEPHED 2.5-7.5/.8
DROPS ORAL
Status: DISCONTINUED | OUTPATIENT
Start: 2024-08-16 | End: 2024-08-16 | Stop reason: HOSPADM

## 2024-08-16 RX ORDER — PROPOFOL 10 MG/ML
VIAL (ML) INTRAVENOUS
Status: DISCONTINUED | OUTPATIENT
Start: 2024-08-16 | End: 2024-08-16

## 2024-08-16 RX ADMIN — PROPOFOL 50 MG: 10 INJECTION, EMULSION INTRAVENOUS at 08:08

## 2024-08-16 RX ADMIN — PROPOFOL 40 MG: 10 INJECTION, EMULSION INTRAVENOUS at 08:08

## 2024-08-16 RX ADMIN — PROPOFOL 30 MG: 10 INJECTION, EMULSION INTRAVENOUS at 08:08

## 2024-08-16 RX ADMIN — PROPOFOL 50 MG: 10 INJECTION, EMULSION INTRAVENOUS at 07:08

## 2024-08-16 RX ADMIN — PROPOFOL 100 MG: 10 INJECTION, EMULSION INTRAVENOUS at 07:08

## 2024-08-16 NOTE — ANESTHESIA POSTPROCEDURE EVALUATION
Anesthesia Post Evaluation    Patient: Venice Orosco    Procedure(s) Performed: Procedure(s) (LRB):  COLONOSCOPY (N/A)    Final Anesthesia Type: MAC      Patient location during evaluation: PACU  Patient participation: Yes- Able to Participate  Level of consciousness: awake and alert  Post-procedure vital signs: reviewed and stable  Pain management: adequate  Airway patency: patent    PONV status at discharge: No PONV  Anesthetic complications: no      Cardiovascular status: blood pressure returned to baseline  Respiratory status: unassisted and room air  Hydration status: euvolemic  Follow-up not needed.              Vitals Value Taken Time   /67 08/16/24 0827   Temp  08/16/24 0840   Pulse 70 08/16/24 0827   Resp 21 08/16/24 0827   SpO2 100 % 08/16/24 0827         No case tracking events are documented in the log.      Pain/Adam Score: Adam Score: 8 (8/16/2024  8:27 AM)

## 2024-08-16 NOTE — [employer] PATIENT INSTRUCTIONS
Discharge Summary/Instructions after an Endoscopic Procedure  Patient Name: Venice Orosco  Patient MRN: [id number]  Patient YOB: 1975 Friday, August 16, 2024 Chrsitie Cooper MD  Dear patient,  As a result of recent federal legislation (The Federal Cures Act), you may   receive lab or pathology results from your procedure in your [facility]   account before your physician is able to contact you. Your physician or   their representative will relay the results to you with their   recommendations at their soonest availability.  Thank you,  RESTRICTIONS:  During your procedure today, you received medications for sedation.  These   medications may affect your judgment, balance and coordination.  Therefore,   for 24 hours, you have the following restrictions:   - DO NOT drive a car, operate machinery, make legal/financial decisions,   sign important papers or drink alcohol.    ACTIVITY:  Today: no heavy lifting, straining or running due to procedural   sedation/anesthesia.  The following day: return to full activity including work.  DIET:  Eat and drink normally unless instructed otherwise.     TREATMENT FOR COMMON SIDE EFFECTS:  - Mild abdominal pain, nausea, belching, bloating or excessive gas:  rest,   eat lightly and use a heating pad.  - Sore Throat: treat with throat lozenges and/or gargle with warm salt   water.  - Because air was used during the procedure, expelling large amounts of air   from your rectum or belching is normal.  - If a bowel prep was taken, you may not have a bowel movement for 1-3 days.    This is normal.  SYMPTOMS TO WATCH FOR AND REPORT TO YOUR PHYSICIAN:  1. Abdominal pain or bloating, other than gas cramps.  2. Chest pain.  3. Back pain.  4. Signs of infection such as: chills or fever occurring within 24 hours   after the procedure.  5. Rectal bleeding, which would show as bright red, maroon, or black stools.   (A tablespoon of blood from the rectum is not serious, especially  if   hemorrhoids are present.)  6. Vomiting.  7. Weakness or dizziness.  GO DIRECTLY TO THE NEAREST EMERGENCY ROOM IF YOU HAVE ANY OF THE FOLLOWING:      Difficulty breathing              Chills and/or fever over 101 F   Persistent vomiting and/or vomiting blood   Severe abdominal pain   Severe chest pain   Black, tarry stools   Bleeding- more than one tablespoon   Any other symptom or condition that you feel may need urgent attention  Your doctor recommends these additional instructions:  If any biopsies were taken, your doctors clinic will contact you in 1 to 2   weeks with any results.  - Discharge patient to home (via wheelchair).   - High fiber diet.   - Continue present medications.   - Await pathology results.   - Repeat colonoscopy in 5 years for surveillance.   - Patient has a contact number available for emergencies.  The signs and   symptoms of potential delayed complications were discussed with the   patient.  Return to normal activities tomorrow.  Written discharge   instructions were provided to the patient.  For questions, problems or results please call your physician Christie Cooper MD at Work:  [phone]  If you have any questions about the above instructions, call the GI   department at [phone] or call the endoscopy unit at [phone]   from 7am until 3 pm.  [facility] [place], EMERGENCY ROOM PHONE NUMBER:   [phone]  IF A COMPLICATION OR EMERGENCY SITUATION ARISES AND YOU ARE UNABLE TO REACH   YOUR PHYSICIAN - GO DIRECTLY TO THE EMERGENCY ROOM.  I have read or have had read to me these discharge instructions for my   procedure and have received a written copy.  I understand these   instructions and will follow-up with my physician if I have any questions.     __________________________________       _____________________________________  Nurse Signature                                          Patient/Designated   Responsible Party Signature  Christie  MD Christie Cooper MD  8/16/2024 8:16:54 AM  [employer]

## 2024-08-16 NOTE — TRANSFER OF CARE
"Anesthesia Transfer of Care Note    Patient: Venice Orosco    Procedure(s) Performed: Procedure(s) (LRB):  COLONOSCOPY (N/A)    Patient location: PACU    Anesthesia Type: MAC    Transport from OR: Transported from OR on room air with adequate spontaneous ventilation    Post pain: adequate analgesia    Post assessment: no apparent anesthetic complications    Post vital signs: stable    Level of consciousness: responds to stimulation    Nausea/Vomiting: no nausea/vomiting    Complications: none    Transfer of care protocol was followed      Last vitals: Visit Vitals  BP (!) 135/52 (BP Location: Left arm, Patient Position: Lying)   Pulse 78   Temp 36.5 °C (97.7 °F) (Temporal)   Resp 18   Ht 5' 9" (1.753 m)   Wt 81.6 kg (180 lb)   LMP  (LMP Unknown)   SpO2 100%   Breastfeeding No   BMI 26.58 kg/m²     "

## 2024-08-16 NOTE — ANESTHESIA PREPROCEDURE EVALUATION
08/16/2024  Venice Orosco is a 49 y.o., female.      Pre-op Assessment    I have reviewed the Patient Summary Reports.     I have reviewed the Nursing Notes. I have reviewed the NPO Status.   I have reviewed the Medications.     Review of Systems  Anesthesia Hx:  No problems with previous Anesthesia             Denies Family Hx of Anesthesia complications.    Denies Personal Hx of Anesthesia complications.                    Social:  Non-Smoker       Hematology/Oncology:  Hematology Normal   Oncology Normal                                   EENT/Dental:  EENT/Dental Normal           Cardiovascular:  Cardiovascular Normal Exercise tolerance: good                                           Pulmonary:  Pulmonary Normal                       Renal/:  Renal/ Normal                 Hepatic/GI:      Liver Disease,            Musculoskeletal:  Arthritis               Neurological:  Neurology Normal                                      Endocrine:  Endocrine Normal            Psych:  Psychiatric History                  Physical Exam  General: Well nourished, Cooperative, Alert and Oriented    Airway:  Mallampati: II   Mouth Opening: Normal  TM Distance: Normal  Tongue: Normal  Neck ROM: Normal ROM    Dental:  Intact    Chest/Lungs:  Clear to auscultation, Normal Respiratory Rate    Heart:  Rate: Normal  Rhythm: Regular Rhythm        Anesthesia Plan  Type of Anesthesia, risks & benefits discussed:    Anesthesia Type: MAC  Intra-op Monitoring Plan: Standard ASA Monitors  Induction:  IV  Informed Consent: Informed consent signed with the Patient and all parties understand the risks and agree with anesthesia plan.  All questions answered.   ASA Score: 2  Day of Surgery Review of History & Physical: H&P Update referred to the surgeon/provider.I have interviewed and examined the patient. I have reviewed the  patient's H&P dated: There are no significant changes.     Ready For Surgery From Anesthesia Perspective.     .

## 2024-08-16 NOTE — DISCHARGE SUMMARY
[facility] - Endoscopy (Hospital)  Discharge Note  Short Stay    Procedure(s) (LRB):  COLONOSCOPY (N/A)      OUTCOME: Patient tolerated treatment/procedure well without complication and is now ready for discharge.    DISPOSITION: Home or Self Care    FINAL DIAGNOSIS:  Chronic idiopathic constipation    FOLLOWUP: With primary care provider    DISCHARGE INSTRUCTIONS:  No discharge procedures on file.

## 2024-08-16 NOTE — H&P
Short Stay Endoscopy History and Physical    PCP - Jennifer Summers MD    Procedure - Colonoscopy  ASA - per anesthesia  Mallampati - per anesthesia  History of Anesthesia problems - no  Family history Anesthesia problems -  no     HPI:  This is a 49 y.o. female here for evaluation of :   Active Hospital Problems    Diagnosis  POA    *Chronic idiopathic constipation [K59.04]  No      Resolved Hospital Problems   No resolved problems to display.         Health Maintenance         Date Due Completion Date    Hemoglobin A1c (Diabetic Prevention Screening) 12/15/2019 12/15/2016    Colorectal Cancer Screening 2020    COVID-19 Vaccine ( - - season) Never done ---    TETANUS VACCINE 11/15/2024 (Originally 3/15/2023) 3/15/2013    Influenza Vaccine (1) 2024 11/3/2021    Mammogram 2025    Cervical Cancer Screening 06/15/2026 6/15/2023    Override on 2015: Done (done yearly are normal.)    Lipid Panel 11/15/2028 11/15/2023              ROS:  CONSTITUTIONAL: Denies weight change,  fatigue, fevers, chills, night sweats.  CARDIOVASCULAR: Denies chest pain, shortness of breath, orthopnea and edema.  RESPIRATORY: Denies cough, hemoptysis, dyspnea, and wheezing.  GI: See HPI.    Medical History:   Past Medical History:   Diagnosis Date    DDD (degenerative disc disease), lumbar     Fatty liver     Nicotine abuse        Surgical History:   Past Surgical History:   Procedure Laterality Date    AUGMENTATION OF BREAST      saline    BREAST SURGERY Bilateral     2010     SECTION      INJECTION OF ANESTHETIC AGENT INTO SACROILIAC JOINT Left 12/15/2021    Procedure: Left SIJ Injection and Benito trigger point injections to the cervical myofascial area;  Surgeon: Bon Valdez MD;  Location: [facility];  Service: Pain Management;  Laterality: Left;    LAPAROSCOPIC GASTRIC BANDING      LAPAROSCOPIC GASTRIC BANDING Left        Family History:   Family History   Problem  Relation Name Age of Onset    Breast cancer Mother  50    Breast cancer Maternal Aunt      Cancer Maternal Grandmother      Hypertension Maternal Grandmother      Heart attack Maternal Grandmother      Breast cancer Maternal Grandmother      Diabetes Cousin         Social History:   Social History     Tobacco Use    Smoking status: Former     Current packs/day: 0.00     Types: Cigarettes     Quit date: 10/7/2015     Years since quittin.8    Smokeless tobacco: Never   Substance Use Topics    Alcohol use: Yes     Alcohol/week: 0.0 standard drinks of alcohol     Comment: occasionally     Drug use: No       Allergies:   Review of patient's allergies indicates:   Allergen Reactions    Penicillins Itching, Rash and Swelling       Medications:   No current facility-administered medications on file prior to encounter.     Current Outpatient Medications on File Prior to Encounter   Medication Sig Dispense Refill    Lactobac no.41/Bifidobact no.7 (PROBIOTIC-10 ORAL) Take by mouth.      levothyroxine (SYNTHROID) 75 MCG tablet Take 1 tablet by mouth every morning.      linaCLOtide (LINZESS) 145 mcg Cap capsule Take 1 capsule (145 mcg total) by mouth before breakfast. 30 capsule 5    lisdexamfetamine (VYVANSE) 70 MG capsule Take 1 capsule (70 mg total) by mouth every morning. 30 capsule 0    lisdexamfetamine (VYVANSE) 70 MG capsule Take 1 capsule (70 mg total) by mouth every morning. 30 capsule 0    valACYclovir (VALTREX) 1000 MG tablet Take 1 tablet by mouth once daily.      VYVANSE 70 mg capsule TAKE ONE CAPSULE BY MOUTH EVERY MORNING 30 capsule 0    azelastine (ASTELIN) 137 mcg (0.1 %) nasal spray 1 spray (137 mcg total) by Nasal route 2 (two) times daily. 30 mL 0    fluticasone propionate (FLONASE) 50 mcg/actuation nasal spray 2 sprays (100 mcg total) by Each Nostril route once daily. 16 g 11    lisdexamfetamine (VYVANSE) 50 MG capsule Take 1 capsule (50 mg total) by mouth every morning. 30 capsule 0    meloxicam (MOBIC)  7.5 MG tablet TAKE ONE TABLET BY MOUTH EVERY DAY 30 tablet 4    rOPINIRole (REQUIP) 0.5 MG tablet TAKE ONE TABLET BY MOUTH EVERY EVENING 30 tablet 11    tiZANidine (ZANAFLEX) 4 MG tablet TAKE ONE TABLET BY MOUTH EVERY 8 HOURS 30 tablet 1    XIIDRA 5 % Dpet          Physical Exam:  Vital Signs:   Vitals:    08/16/24 0726   BP: (!) 135/52   Pulse: 78   Resp: 18   Temp: 97.7 °F (36.5 °C)     General Appearance: Well appearing in no acute distress  ENT: OP clear  Chest: CTA B  CV: RRR, no m/r/g  Abd: s/nt/nd/nabs  Ext: no edema    Labs:Reviewed    IMP:  Active Hospital Problems    Diagnosis  POA    *Chronic idiopathic constipation [K59.04]  No      Resolved Hospital Problems   No resolved problems to display.         Plan:   I have explained the risks and benefits of colonoscopy to the patient including but not limited to bleeding, perforation, infection, and death. The patient wishes to proceed.

## 2024-08-19 VITALS
DIASTOLIC BLOOD PRESSURE: 62 MMHG | OXYGEN SATURATION: 100 % | WEIGHT: 180 LBS | HEIGHT: 69 IN | TEMPERATURE: 98 F | SYSTOLIC BLOOD PRESSURE: 123 MMHG | RESPIRATION RATE: 18 BRPM | BODY MASS INDEX: 26.66 KG/M2 | HEART RATE: 70 BPM

## 2024-08-19 LAB
FINAL PATHOLOGIC DIAGNOSIS: NORMAL
GROSS: NORMAL
Lab: NORMAL

## 2024-08-21 ENCOUNTER — PATIENT MESSAGE (OUTPATIENT)
Dept: FAMILY MEDICINE | Facility: CLINIC | Age: 49
End: 2024-08-21
Payer: COMMERCIAL

## 2024-08-21 DIAGNOSIS — F90.9 ADULT ADHD: ICD-10-CM

## 2024-08-21 RX ORDER — LISDEXAMFETAMINE DIMESYLATE 70 MG/1
70 CAPSULE ORAL EVERY MORNING
Qty: 30 CAPSULE | Refills: 0 | OUTPATIENT
Start: 2024-08-21

## 2024-08-21 NOTE — TELEPHONE ENCOUNTER
Care Due:                  Date            Visit Type   Department     Provider  --------------------------------------------------------------------------------                                EP -                              PRIMARY      [facility]  Last Visit: 07-      CARE ([facility])   MEDICINE       Jeanine Silva  Next Visit: None Scheduled  None         None Found                                                            Last  Test          Frequency    Reason                     Performed    Due Date  --------------------------------------------------------------------------------    CBC.........  12 months..  meloxicam................  11-   11-    CMP.........  12 months..  meloxicam................  11-   11-    [employer] Embedded Care Due Messages. Reference number: [id number].   8/21/2024 8:02:59 AM CDT  
Medications are being refused due to patient needing a visit and/or needing labs.  
Please see the attached refill request.  
[id number]:1 week|| ||00\01||False;[id number]: ||1/19/2022||11\30\00||False;

## 2024-08-22 ENCOUNTER — PATIENT MESSAGE (OUTPATIENT)
Dept: FAMILY MEDICINE | Facility: CLINIC | Age: 49
End: 2024-08-22
Payer: COMMERCIAL

## 2024-08-22 DIAGNOSIS — F90.9 ADULT ADHD: ICD-10-CM

## 2024-08-22 RX ORDER — LISDEXAMFETAMINE DIMESYLATE 70 MG/1
70 CAPSULE ORAL EVERY MORNING
Qty: 30 CAPSULE | Refills: 0 | OUTPATIENT
Start: 2024-08-22

## 2024-08-22 NOTE — TELEPHONE ENCOUNTER
Medications are being refused due to patient needing a visit and/or needing labs.  
No care due was identified.  [facility] Embedded Care Due Messages. Reference number: [id number].   8/22/2024 9:06:12 AM CDT  
Please see the attached refill request.  
Pt reports being independent in all ADLs, transfers, functional mobility with straight cane prior to stroke. 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

## 2024-08-26 ENCOUNTER — OFFICE VISIT (OUTPATIENT)
Dept: FAMILY MEDICINE | Facility: CLINIC | Age: 49
End: 2024-08-26
Payer: COMMERCIAL

## 2024-08-26 DIAGNOSIS — F90.9 ADULT ADHD: Primary | ICD-10-CM

## 2024-08-26 DIAGNOSIS — K59.04 CHRONIC IDIOPATHIC CONSTIPATION: ICD-10-CM

## 2024-08-26 PROCEDURE — 99214 OFFICE O/P EST MOD 30 MIN: CPT | Mod: 95,,, | Performed by: FAMILY MEDICINE

## 2024-08-26 RX ORDER — LISDEXAMFETAMINE DIMESYLATE 70 MG/1
70 CAPSULE ORAL EVERY MORNING
Qty: 30 CAPSULE | Refills: 0 | Status: SHIPPED | OUTPATIENT
Start: 2024-09-28

## 2024-08-26 RX ORDER — LISDEXAMFETAMINE DIMESYLATE 70 MG/1
70 CAPSULE ORAL EVERY MORNING
Qty: 30 CAPSULE | Refills: 0 | Status: SHIPPED | OUTPATIENT
Start: 2024-10-28

## 2024-08-26 RX ORDER — LISDEXAMFETAMINE DIMESYLATE 50 MG/1
50 CAPSULE ORAL EVERY MORNING
Qty: 30 CAPSULE | Refills: 0 | Status: SHIPPED | OUTPATIENT
Start: 2024-08-28

## 2024-08-26 NOTE — PROGRESS NOTES
"  Chief Complaint:  No chief complaint on file.      History of Present Illness:    Patient presents today for medication refill,    Doing well, compliant with medication. Vyvanse 70 mg    History of Present Illness    Patient presents today for medication refill. She reports ongoing constipation. Linzess is no longer effective after two months of use, stating it "was not working on a daily basis" and is "really not at all" working anymore. She has been using MiraLax daily for the past 3-4 days as an alternative. She reports that MiraLax is "kind of helping" but is unsure if it's "doing exactly what it's supposed to." She expresses uncertainty about the appropriate frequency of bowel movements. She reports current prescription of Vyvanse and requires a refill. She is taking Linzess but is considering discontinuation due to ineffectiveness. She has been using MiraLax daily for the past 3-4 days as a stool softener. She is not currently taking Requip and has not used it in a long time, as it was previously used only as needed. She uses two pharmacies for her medications: [facility] as her primary pharmacy and [facility] as an alternative. She confirms that either pharmacy can be used for her prescriptions.    ROS:  General: denies fever, denies chills, denies fatigue, denies weight gain, denies weight loss, denies loss of appetite  Eyes: denies vision changes, denies blurry vision, denies eye pain, denies eye discharge  ENT: denies ear pain, denies hearing loss, denies tinnitus, denies nasal congestion, denies sore throat  Cardiovascular: denies chest pain, denies palpitations, denies lower extremity edema  Respiratory: denies cough, denies shortness of breath, denies wheezing, denies sputum production  Endocrine: denies polyuria, denies polydipsia, denies heat intolerance, denies cold intolerance  Gastrointestinal: denies abdominal pain, denies heartburn, denies nausea, denies vomiting, denies diarrhea, " admits constipation, denies blood in stool  Genitourinary: denies dysuria, denies urgency, denies frequency, denies hematuria, denies nocturia, denies incontinence  Heme & Lymphatic: denies easy or excessive bleeding, denies easy bruising, denies swollen lymph nodes  Musculoskeletal: denies muscle pain, denies back pain, denies joint pain, denies joint swelling  Skin: denies rash, denies lesion, denies itching, denies skin texture changes, denies skin color changes  Neurological: denies headache, denies dizziness, denies numbness, denies tingling, denies seizure activity, denies speech difficulty, denies memory loss, denies confusion  Psychiatric: denies anxiety, denies depression, denies sleep difficulty             Past Medical History:   Diagnosis Date    DDD (degenerative disc disease), lumbar     Fatty liver     Nicotine abuse        Social History:  Social History     Socioeconomic History    Marital status:    Occupational History     Employer: [employer]   Tobacco Use    Smoking status: Former     Current packs/day: 0.00     Types: Cigarettes     Quit date: 10/7/2015     Years since quittin.8    Smokeless tobacco: Never   Substance and Sexual Activity    Alcohol use: Yes     Alcohol/week: 0.0 standard drinks of alcohol     Comment: occasionally     Drug use: No    Sexual activity: Yes     Partners: Male     Birth control/protection: None     Social Determinants of Health     Financial Resource Strain: Patient Declined (11/15/2023)    Overall Financial Resource Strain (CARDIA)     Difficulty of Paying Living Expenses: Patient declined   Food Insecurity: Patient Declined (11/15/2023)    Hunger Vital Sign     Worried About Running Out of Food in the Last Year: Patient declined     Ran Out of Food in the Last Year: Patient declined   Transportation Needs: Patient Declined (11/15/2023)    PRAPARE - Transportation     Lack of Transportation (Medical): Patient declined     Lack of Transportation  (Non-Medical): Patient declined   Physical Activity: Unknown (11/15/2023)    Exercise Vital Sign     Days of Exercise per Week: Patient declined   Stress: Patient Declined (11/15/2023)    [employer] [place] [employer] - Occupational Stress Questionnaire     Feeling of Stress : Patient declined   Housing Stability: Unknown (11/15/2023)    Housing Stability Vital Sign     Unable to Pay for Housing in the Last Year: Patient refused     Unstable Housing in the Last Year: Patient refused       Family History:   family history includes Breast cancer in her maternal aunt and maternal grandmother; Breast cancer (age of onset: 50) in her mother; Cancer in her maternal grandmother; Diabetes in her cousin; Heart attack in her maternal grandmother; Hypertension in her maternal grandmother.    Health Maintenance   Topic Date Due    TETANUS VACCINE  11/15/2024 (Originally 3/15/2023)    Mammogram  04/30/2025    Lipid Panel  11/15/2028    Colorectal Cancer Screening  08/16/2029    Hepatitis C Screening  Completed       Exam:Physical      ]    There is no height or weight on file to calculate BMI.    Physical Exam      Assessment:      ICD-10-CM ICD-9-CM   1. Adult ADHD  F90.9 314.01   2. Chronic idiopathic constipation  K59.04 564.00           Plan:     check. Vyvanse refill    Assessment & Plan    MEDICAL DECISION MAKING:  - Assessed current medication regimen, including Vyvanse and Linzess  - Evaluated use of MiraLax as daily stool softener  - Noted importance of rotating constipation treatments to prevent tolerance  PATIENT EDUCATION:  - Explained that MiraLax can be used daily for constipation management  - Discussed the need for rotating constipation treatments to maintain effectiveness  MEDICATIONS:  - Refilled Vyvanse, sent to [facility] pharmacy  - Continued MiraLax as daily stool softener  - Discontinued Requip (patient reported not taking it for a long time, was only as needed)  FOLLOW UP:  - Follow up with  Linzess prescriber regarding medication ineffectiveness and potential changes         The patient location is: Home   The chief complaint leading to consultation is: as below  Visit type: Virtual visit with synchronous audio and video  Total time spent with patient: 15+ mins  Each patient to whom he or she provides medical services by telemedicine is:  (1) informed of the relationship between the physician and patient and the respective role of any other health care provider with respect to management of the patient; and (2) notified that he or she may decline to receive medical services by telemedicine and may withdraw from such care at any time.    Notes: see below   Orders Placed This Encounter    lisdexamfetamine (VYVANSE) 50 MG capsule    lisdexamfetamine (VYVANSE) 70 MG capsule    lisdexamfetamine (VYVANSE) 70 MG capsule        Current Outpatient Medications   Medication Sig Dispense Refill    azelastine (ASTELIN) 137 mcg (0.1 %) nasal spray 1 spray (137 mcg total) by Nasal route 2 (two) times daily. 30 mL 0    fluticasone propionate (FLONASE) 50 mcg/actuation nasal spray 2 sprays (100 mcg total) by Each Nostril route once daily. 16 g 11    Lactobac no.41/Bifidobact no.7 (PROBIOTIC-10 ORAL) Take by mouth.      levothyroxine (SYNTHROID) 75 MCG tablet Take 1 tablet by mouth every morning.      linaCLOtide (LINZESS) 145 mcg Cap capsule Take 1 capsule (145 mcg total) by mouth before breakfast. 30 capsule 5    [START ON 8/28/2024] lisdexamfetamine (VYVANSE) 50 MG capsule Take 1 capsule (50 mg total) by mouth every morning. 30 capsule 0    [START ON 9/28/2024] lisdexamfetamine (VYVANSE) 70 MG capsule Take 1 capsule (70 mg total) by mouth every morning. 30 capsule 0    [START ON 10/28/2024] lisdexamfetamine (VYVANSE) 70 MG capsule Take 1 capsule (70 mg total) by mouth every morning. 30 capsule 0    meloxicam (MOBIC) 7.5 MG tablet TAKE ONE TABLET BY MOUTH EVERY DAY 30 tablet 4    rOPINIRole (REQUIP) 0.5 MG tablet TAKE  ONE TABLET BY MOUTH EVERY EVENING 30 tablet 11    tiZANidine (ZANAFLEX) 4 MG tablet TAKE ONE TABLET BY MOUTH EVERY 8 HOURS 30 tablet 1    valACYclovir (VALTREX) 1000 MG tablet Take 1 tablet by mouth once daily.      VYVANSE 70 mg capsule TAKE ONE CAPSULE BY MOUTH EVERY MORNING 30 capsule 0    XIIDRA 5 % Dpet        No current facility-administered medications for this visit.      Medications Discontinued During This Encounter   Medication Reason    lisdexamfetamine (VYVANSE) 50 MG capsule Reorder    lisdexamfetamine (VYVANSE) 70 MG capsule Reorder    lisdexamfetamine (VYVANSE) 70 MG capsule Reorder      No follow-ups on file.      Jennifer Summers MD    Scribe Attestation:

## 2024-08-26 NOTE — TELEPHONE ENCOUNTER
No care due was identified.  [facility] Embedded Care Due Messages. Reference number: [id number].   8/26/2024 6:21:34 PM CDT

## 2024-08-27 RX ORDER — LISDEXAMFETAMINE DIMESYLATE 70 MG/1
CAPSULE ORAL
Qty: 30 CAPSULE | Refills: 0 | OUTPATIENT
Start: 2024-08-27

## 2024-08-29 DIAGNOSIS — F90.9 ADULT ADHD: Primary | ICD-10-CM

## 2024-08-29 RX ORDER — LISDEXAMFETAMINE DIMESYLATE 70 MG/1
70 CAPSULE ORAL EVERY MORNING
Qty: 30 CAPSULE | Refills: 0 | Status: SHIPPED | OUTPATIENT
Start: 2024-08-29 | End: 2024-09-28

## 2024-08-29 NOTE — TELEPHONE ENCOUNTER
You sent in one vyvanse 50mg script for this month but the next 2 months are for the correct dose at 70mg.  Please resend this corrected dose of 70mg for this month to the pharm

## 2024-08-29 NOTE — TELEPHONE ENCOUNTER
No care due was identified.  [facility] Embedded Care Due Messages. Reference number: [id number].   8/29/2024 9:38:32 AM CDT

## 2024-09-23 DIAGNOSIS — F90.9 ADULT ADHD: ICD-10-CM

## 2024-09-23 RX ORDER — LISDEXAMFETAMINE DIMESYLATE 70 MG/1
70 CAPSULE ORAL EVERY MORNING
Qty: 30 CAPSULE | Refills: 0 | Status: SHIPPED | OUTPATIENT
Start: 2024-09-23

## 2024-09-23 NOTE — TELEPHONE ENCOUNTER
No care due was identified.  [facility] Embedded Care Due Messages. Reference number: [id number].   9/23/2024 12:38:12 PM CDT

## 2024-10-09 ENCOUNTER — PATIENT MESSAGE (OUTPATIENT)
Dept: GASTROENTEROLOGY | Facility: CLINIC | Age: 49
End: 2024-10-09
Payer: COMMERCIAL

## 2024-10-10 DIAGNOSIS — K59.04 CHRONIC IDIOPATHIC CONSTIPATION: Primary | ICD-10-CM

## 2024-10-28 ENCOUNTER — PATIENT MESSAGE (OUTPATIENT)
Dept: GASTROENTEROLOGY | Facility: CLINIC | Age: 49
End: 2024-10-28
Payer: COMMERCIAL

## 2024-11-15 ENCOUNTER — OFFICE VISIT (OUTPATIENT)
Dept: GASTROENTEROLOGY | Facility: CLINIC | Age: 49
End: 2024-11-15
Payer: COMMERCIAL

## 2024-11-15 DIAGNOSIS — Z98.84 HX OF LAPAROSCOPIC ADJUSTABLE GASTRIC BANDING: ICD-10-CM

## 2024-11-15 DIAGNOSIS — K44.9 HIATAL HERNIA: ICD-10-CM

## 2024-11-15 DIAGNOSIS — K59.04 CHRONIC IDIOPATHIC CONSTIPATION: ICD-10-CM

## 2024-11-15 DIAGNOSIS — Z86.0100 HISTORY OF COLON POLYPS: ICD-10-CM

## 2024-11-15 DIAGNOSIS — R13.19 ESOPHAGEAL DYSPHAGIA: Primary | ICD-10-CM

## 2024-11-15 NOTE — PROGRESS NOTES
[facility]  Gastroenterology    Patient evaluated at the request of Diego Owusu MD  [address]  [address]  [place] [id number]    PCP: Jennifer Summers MD    11/15/24    The patient location is: Home  The chief complaint leading to consultation is: Dysphagia, Constipation    Visit type: audiovisual    Face to Face time with patient: 20 minutes of total time spent on the encounter, which includes face to face time and non-face to face time preparing to see the patient (eg, review of tests), Obtaining and/or reviewing separately obtained history, Documenting clinical information in the electronic or other health record, Independently interpreting results (not separately reported) and communicating results to the patient/family/caregiver, or Care coordination (not separately reported).         Each patient to whom he or she provides medical services by telemedicine is:  (1) informed of the relationship between the physician and patient and the respective role of any other health care provider with respect to management of the patient; and (2) notified that he or she may decline to receive medical services by telemedicine and may withdraw from such care at any time.    Notes:       Subjective:   Venice Orosco is a 49 y.o. female here for evaluation of dysphagia and constipation.  Patient last seen for constipation second opinion. Colonoscopy was performed 08/2024 which showed two small polyps and recall of 5 years recommended. Linzess dose was increased to 290 mcg. If no improvement, plan was for anorectal manometry. Still just has gritty stools and now medication has been working less efficiently. Part of the issue is diet is limited due to her upper GI issues which is the main reason for visit today. Patient has history of lap band surgery many years ago and hiatal hernia which later re-developed. Has issues with chronic intermittent dysphagia and heartburn/reflux  sensation. She has seen surgery for work-up and EGD was recommended along with upper GI series which is scheduled later this month for her.       Past Medical History:   Diagnosis Date    DDD (degenerative disc disease), lumbar     Fatty liver     Nicotine abuse        Past Surgical History:   Procedure Laterality Date    AUGMENTATION OF BREAST      saline    BREAST SURGERY Bilateral     2010     SECTION      COLONOSCOPY N/A 2024    Procedure: COLONOSCOPY;  Surgeon: Christie Cooper MD;  Location: [facility] ENDO;  Service: Endoscopy;  Laterality: N/A;    INJECTION OF ANESTHETIC AGENT INTO SACROILIAC JOINT Left 12/15/2021    Procedure: Left SIJ Injection and Benito trigger point injections to the cervical myofascial area;  Surgeon: Bon Valdez MD;  Location: [facility] PAIN MGT;  Service: Pain Management;  Laterality: Left;    LAPAROSCOPIC GASTRIC BANDING      LAPAROSCOPIC GASTRIC BANDING Left        Current Outpatient Medications on File Prior to Visit   Medication Sig Dispense Refill    azelastine (ASTELIN) 137 mcg (0.1 %) nasal spray 1 spray (137 mcg total) by Nasal route 2 (two) times daily. 30 mL 0    fluticasone propionate (FLONASE) 50 mcg/actuation nasal spray 2 sprays (100 mcg total) by Each Nostril route once daily. 16 g 11    Lactobac no.41/Bifidobact no.7 (PROBIOTIC-10 ORAL) Take by mouth.      levothyroxine (SYNTHROID) 75 MCG tablet Take 1 tablet by mouth every morning.      linaCLOtide (LINZESS) 290 mcg Cap capsule Take 1 capsule (290 mcg total) by mouth before breakfast. 30 capsule 3    lisdexamfetamine (VYVANSE) 50 MG capsule Take 1 capsule (50 mg total) by mouth every morning. 30 capsule 0    lisdexamfetamine (VYVANSE) 70 MG capsule Take 1 capsule (70 mg total) by mouth every morning. 30 capsule 0    lisdexamfetamine (VYVANSE) 70 MG capsule Take 1 capsule (70 mg total) by mouth every morning. 30 capsule 0    meloxicam (MOBIC) 7.5 MG tablet TAKE ONE TABLET BY MOUTH EVERY DAY 30 tablet  4    rOPINIRole (REQUIP) 0.5 MG tablet TAKE ONE TABLET BY MOUTH EVERY EVENING 30 tablet 11    tiZANidine (ZANAFLEX) 4 MG tablet TAKE ONE TABLET BY MOUTH EVERY 8 HOURS 30 tablet 1    valACYclovir (VALTREX) 1000 MG tablet Take 1 tablet by mouth once daily.      VYVANSE 70 mg capsule TAKE ONE CAPSULE BY MOUTH EVERY MORNING 30 capsule 0    XIIDRA 5 % Dpet        No current facility-administered medications on file prior to visit.       Review of patient's allergies indicates:   Allergen Reactions    Penicillins Itching, Rash and Swelling       Social History     Socioeconomic History    Marital status:    Occupational History     Employer: homemaker   Tobacco Use    Smoking status: Former     Current packs/day: 0.00     Types: Cigarettes     Quit date: 10/7/2015     Years since quittin.1    Smokeless tobacco: Never   Substance and Sexual Activity    Alcohol use: Yes     Alcohol/week: 0.0 standard drinks of alcohol     Comment: occasionally     Drug use: No    Sexual activity: Yes     Partners: Male     Birth control/protection: None     Social Drivers of Health     Financial Resource Strain: Patient Declined (11/15/2023)    Overall Financial Resource Strain (CARDIA)     Difficulty of Paying Living Expenses: Patient declined   Food Insecurity: Patient Declined (11/15/2023)    Hunger Vital Sign     Worried About Running Out of Food in the Last Year: Patient declined     Ran Out of Food in the Last Year: Patient declined   Transportation Needs: Patient Declined (11/15/2023)    PRAPARE - Transportation     Lack of Transportation (Medical): Patient declined     Lack of Transportation (Non-Medical): Patient declined   Physical Activity: Unknown (11/15/2023)    Exercise Vital Sign     Days of Exercise per Week: Patient declined   Stress: Patient Declined (11/15/2023)    [employer] [place] [employer] - Occupational Stress Questionnaire     Feeling of Stress : Patient declined   Housing Stability: Unknown  (11/15/2023)    Housing Stability Vital Sign     Unable to Pay for Housing in the Last Year: Patient refused     Unstable Housing in the Last Year: Patient refused       Family History   Problem Relation Name Age of Onset    Breast cancer Mother  50    Breast cancer Maternal Aunt      Cancer Maternal Grandmother      Hypertension Maternal Grandmother      Heart attack Maternal Grandmother      Breast cancer Maternal Grandmother      Diabetes Cousin         Review of Systems   Constitutional:  Negative for appetite change, fever and unexpected weight change.   HENT:  Positive for trouble swallowing. Negative for postnasal drip, rhinorrhea, sneezing and sore throat.    Eyes:  Negative for visual disturbance.   Respiratory:  Negative for cough, shortness of breath and wheezing.    Cardiovascular:  Negative for chest pain, palpitations and leg swelling.   Gastrointestinal:  Positive for constipation. Negative for abdominal pain, blood in stool, diarrhea, nausea and vomiting.   Genitourinary:  Negative for dysuria.   Musculoskeletal:  Negative for arthralgias, joint swelling and myalgias.   Skin:  Negative for color change, pallor and rash.   Neurological:  Negative for weakness, light-headedness, numbness and headaches.   Hematological:  Negative for adenopathy. Does not bruise/bleed easily.   Psychiatric/Behavioral:  Negative for agitation.            Objective:   Vitals: There were no vitals filed for this visit.    Physical Exam Unable to perform due to video visit    IMPRESSION     Problem List Items Addressed This Visit          Endocrine    Hx of laparoscopic adjustable gastric banding       GI    Chronic idiopathic constipation     Other Visit Diagnoses       Esophageal dysphagia    -  Primary    History of colon polyps        Hiatal hernia                PLANS:    - Schedule for EGD for evaluation of GERD and hiatal hernia, hx of lap band  - Continue Linzess. Can add in Miralax PRN. If unhelpful and manometry  normal, can change to Amitiza  - Will obtain anorectal manometry to complete constipation work-up  - Colonoscopy completed 08/2024 with two small polyps removed, recall of 5 years recommended  - Further recs pending the above    Esophageal dysphagia    Chronic idiopathic constipation    History of colon polyps    Hx of laparoscopic adjustable gastric banding    Hiatal hernia        Christie Cooper MD  Gastroenterology

## 2024-11-18 ENCOUNTER — HOSPITAL ENCOUNTER (OUTPATIENT)
Dept: PREADMISSION TESTING | Facility: HOSPITAL | Age: 49
Discharge: HOME | End: 2024-11-18
Attending: INTERNAL MEDICINE
Payer: COMMERCIAL

## 2024-11-18 DIAGNOSIS — R13.19 ESOPHAGEAL DYSPHAGIA: Primary | ICD-10-CM

## 2024-11-26 ENCOUNTER — ANESTHESIA EVENT (OUTPATIENT)
Dept: ENDOSCOPY | Facility: HOSPITAL | Age: 49
End: 2024-11-26
Payer: COMMERCIAL

## 2024-11-26 NOTE — ANESTHESIA PREPROCEDURE EVALUATION
2024  Venice Orosco is a 49 y.o., female.    Past Medical History:   Diagnosis Date    DDD (degenerative disc disease), lumbar     Fatty liver     Nicotine abuse      Past Surgical History:   Procedure Laterality Date    AUGMENTATION OF BREAST      saline    BREAST SURGERY Bilateral     2010     SECTION      COLONOSCOPY N/A 2024    Procedure: COLONOSCOPY;  Surgeon: Christie Cooper MD;  Location: [facility] ENDO;  Service: Endoscopy;  Laterality: N/A;    INJECTION OF ANESTHETIC AGENT INTO SACROILIAC JOINT Left 12/15/2021    Procedure: Left SIJ Injection and Benito trigger point injections to the cervical myofascial area;  Surgeon: Bon Valdez MD;  Location: [facility];  Service: Pain Management;  Laterality: Left;    LAPAROSCOPIC GASTRIC BANDING      LAPAROSCOPIC GASTRIC BANDING Left        Pre-op Assessment    I have reviewed the Patient Summary Reports.     I have reviewed the Nursing Notes. I have reviewed the NPO Status.   I have reviewed the Medications.     Review of Systems  Anesthesia Hx:  No problems with previous Anesthesia   History of prior surgery of interest to airway management or planning:  Previous anesthesia: General        Denies Family Hx of Anesthesia complications.    Denies Personal Hx of Anesthesia complications.                    Social:  Former Smoker, Social Alcohol Use       Hematology/Oncology:  Hematology Normal   Oncology Normal                                   EENT/Dental:  EENT/Dental Normal           Cardiovascular:  Cardiovascular Normal Exercise tolerance: good                                             Pulmonary:  Pulmonary Normal                       Renal/:  Renal/ Normal                 Hepatic/GI:     GERD Liver Disease,               Musculoskeletal:  Arthritis   DDD            Neurological:  Neurology Normal                                       Endocrine:  Endocrine Normal            Psych:  Psychiatric History   ADHD               Physical Exam  General: Alert and Oriented    Airway:  Mallampati: II   Mouth Opening: Normal  TM Distance: Normal  Tongue: Normal  Neck ROM: Normal ROM    Dental:  Intact    Chest/Lungs:  Clear to auscultation, Normal Respiratory Rate    Heart:  Rate: Normal  Rhythm: Regular Rhythm        Anesthesia Plan  Type of Anesthesia, risks & benefits discussed:    Anesthesia Type: Gen Natural Airway  Intra-op Monitoring Plan: Standard ASA Monitors  Post Op Pain Control Plan: multimodal analgesia and IV/PO Opioids PRN  Induction:  IV  Informed Consent: Informed consent signed with the Patient and all parties understand the risks and agree with anesthesia plan.  All questions answered.   ASA Score: 2  Day of Surgery Review of History & Physical: H&P Update referred to the surgeon/provider.    Ready For Surgery From Anesthesia Perspective.     .

## 2024-12-02 ENCOUNTER — HOSPITAL ENCOUNTER (OUTPATIENT)
Facility: HOSPITAL | Age: 49
Discharge: HOME | End: 2024-12-02
Attending: INTERNAL MEDICINE | Admitting: INTERNAL MEDICINE
Payer: COMMERCIAL

## 2024-12-02 ENCOUNTER — ANESTHESIA (OUTPATIENT)
Dept: ENDOSCOPY | Facility: HOSPITAL | Age: 49
End: 2024-12-02
Payer: COMMERCIAL

## 2024-12-02 ENCOUNTER — PATIENT MESSAGE (OUTPATIENT)
Dept: GASTROENTEROLOGY | Facility: CLINIC | Age: 49
End: 2024-12-02

## 2024-12-02 VITALS
SYSTOLIC BLOOD PRESSURE: 116 MMHG | WEIGHT: 188.63 LBS | RESPIRATION RATE: 20 BRPM | DIASTOLIC BLOOD PRESSURE: 78 MMHG | OXYGEN SATURATION: 100 % | BODY MASS INDEX: 27.94 KG/M2 | TEMPERATURE: 97 F | HEIGHT: 69 IN | HEART RATE: 79 BPM

## 2024-12-02 DIAGNOSIS — R13.12 OROPHARYNGEAL DYSPHAGIA: ICD-10-CM

## 2024-12-02 DIAGNOSIS — K59.04 CHRONIC IDIOPATHIC CONSTIPATION: ICD-10-CM

## 2024-12-02 DIAGNOSIS — Z98.84 HX OF LAPAROSCOPIC ADJUSTABLE GASTRIC BANDING: ICD-10-CM

## 2024-12-02 DIAGNOSIS — Z87.19 HISTORY OF HIATAL HERNIA: ICD-10-CM

## 2024-12-02 DIAGNOSIS — K21.9 GASTROESOPHAGEAL REFLUX DISEASE, UNSPECIFIED WHETHER ESOPHAGITIS PRESENT: Primary | ICD-10-CM

## 2024-12-02 DIAGNOSIS — K21.9 GERD (GASTROESOPHAGEAL REFLUX DISEASE): ICD-10-CM

## 2024-12-02 PROBLEM — R13.10 DYSPHAGIA: Status: ACTIVE | Noted: 2024-12-02

## 2024-12-02 LAB
B-HCG UR QL: NEGATIVE
CTP QC/QA: YES

## 2024-12-02 PROCEDURE — 43239 EGD BIOPSY SINGLE/MULTIPLE: CPT | Performed by: INTERNAL MEDICINE

## 2024-12-02 PROCEDURE — 43239 EGD BIOPSY SINGLE/MULTIPLE: CPT | Mod: ,,, | Performed by: INTERNAL MEDICINE

## 2024-12-02 PROCEDURE — 63600175 PHARM REV CODE 636 W HCPCS: Performed by: NURSE ANESTHETIST, CERTIFIED REGISTERED

## 2024-12-02 PROCEDURE — 81025 URINE PREGNANCY TEST: CPT | Performed by: INTERNAL MEDICINE

## 2024-12-02 PROCEDURE — 88312 SPECIAL STAINS GROUP 1: CPT | Performed by: STUDENT IN AN ORGANIZED HEALTH CARE EDUCATION/TRAINING PROGRAM

## 2024-12-02 PROCEDURE — 88342 IMHCHEM/IMCYTCHM 1ST ANTB: CPT | Performed by: STUDENT IN AN ORGANIZED HEALTH CARE EDUCATION/TRAINING PROGRAM

## 2024-12-02 PROCEDURE — 88305 TISSUE EXAM BY PATHOLOGIST: CPT | Performed by: STUDENT IN AN ORGANIZED HEALTH CARE EDUCATION/TRAINING PROGRAM

## 2024-12-02 PROCEDURE — 37000008 HC ANESTHESIA 1ST 15 MINUTES: Performed by: INTERNAL MEDICINE

## 2024-12-02 PROCEDURE — 27201012 HC FORCEPS, HOT/COLD, DISP: Performed by: INTERNAL MEDICINE

## 2024-12-02 RX ORDER — SODIUM CHLORIDE, SODIUM LACTATE, POTASSIUM CHLORIDE, CALCIUM CHLORIDE 600; 310; 30; 20 MG/100ML; MG/100ML; MG/100ML; MG/100ML
INJECTION, SOLUTION INTRAVENOUS CONTINUOUS
Status: DISCONTINUED | OUTPATIENT
Start: 2024-12-02 | End: 2024-12-02 | Stop reason: HOSPADM

## 2024-12-02 RX ORDER — LISDEXAMFETAMINE DIMESYLATE 70 MG/1
CAPSULE ORAL
Qty: 30 CAPSULE | Refills: 0 | OUTPATIENT
Start: 2024-12-02

## 2024-12-02 RX ORDER — PROPOFOL 10 MG/ML
VIAL (ML) INTRAVENOUS
Status: DISCONTINUED | OUTPATIENT
Start: 2024-12-02 | End: 2024-12-02

## 2024-12-02 RX ORDER — PANTOPRAZOLE SODIUM 40 MG/1
40 TABLET, DELAYED RELEASE ORAL DAILY
Qty: 30 TABLET | Refills: 3 | Status: SHIPPED | OUTPATIENT
Start: 2024-12-02 | End: 2025-04-01

## 2024-12-02 RX ORDER — LIDOCAINE HYDROCHLORIDE 20 MG/ML
INJECTION INTRAVENOUS
Status: DISCONTINUED | OUTPATIENT
Start: 2024-12-02 | End: 2024-12-02

## 2024-12-02 RX ADMIN — PROPOFOL 50 MG: 10 INJECTION, EMULSION INTRAVENOUS at 12:12

## 2024-12-02 RX ADMIN — PROPOFOL 150 MG: 10 INJECTION, EMULSION INTRAVENOUS at 12:12

## 2024-12-02 RX ADMIN — LIDOCAINE HYDROCHLORIDE 20 MG: 20 INJECTION INTRAVENOUS at 12:12

## 2024-12-02 NOTE — H&P
Short Stay Endoscopy History and Physical    PCP - Jennifer Summers MD    Procedure - EGD  ASA - per anesthesia  Mallampati - per anesthesia  History of Anesthesia problems - no  Family history Anesthesia problems -  no     HPI:  This is a 49 y.o. female here for evaluation of :   Active Hospital Problems    Diagnosis  POA    *GERD (gastroesophageal reflux disease) [K21.9]  Yes    Dysphagia [R13.10]  No    History of hiatal hernia [Z87.19]  Not Applicable    Hx of laparoscopic adjustable gastric banding [Z98.84]  Not Applicable      Resolved Hospital Problems   No resolved problems to display.         ROS:  CONSTITUTIONAL: Denies weight change,  fatigue, fevers, chills, night sweats.  CARDIOVASCULAR: Denies chest pain, shortness of breath, orthopnea and edema.  RESPIRATORY: Denies cough, hemoptysis, dyspnea, and wheezing.  GI: See HPI.    Medical History:   Past Medical History:   Diagnosis Date    DDD (degenerative disc disease), lumbar     Fatty liver     Nicotine abuse        Surgical History:   Past Surgical History:   Procedure Laterality Date    AUGMENTATION OF BREAST      saline    BREAST SURGERY Bilateral          SECTION      COLONOSCOPY N/A 2024    Procedure: COLONOSCOPY;  Surgeon: Christie Cooper MD;  Location: [facility];  Service: Endoscopy;  Laterality: N/A;    INJECTION OF ANESTHETIC AGENT INTO SACROILIAC JOINT Left 12/15/2021    Procedure: Left SIJ Injection and Benito trigger point injections to the cervical myofascial area;  Surgeon: Bon Valdez MD;  Location: [facility] PAIN [facility];  Service: Pain Management;  Laterality: Left;    LAPAROSCOPIC GASTRIC BANDING      LAPAROSCOPIC GASTRIC BANDING Left        Family History:  Family History   Problem Relation Name Age of Onset    Breast cancer Mother  50    Breast cancer Maternal Aunt      Cancer Maternal Grandmother      Hypertension Maternal Grandmother      Heart attack Maternal Grandmother      Breast cancer Maternal  Grandmother      Diabetes Cousin         Social History:   Social History     Tobacco Use    Smoking status: Former     Current packs/day: 0.00     Types: Cigarettes     Quit date: 10/7/2015     Years since quittin.1    Smokeless tobacco: Never   Substance Use Topics    Alcohol use: Yes     Alcohol/week: 0.0 standard drinks of alcohol     Comment: occasionally     Drug use: No       Allergy  Review of patient's allergies indicates:   Allergen Reactions    Penicillins Itching, Rash and Swelling       Medications:   No current facility-administered medications on file prior to encounter.     Current Outpatient Medications on File Prior to Encounter   Medication Sig Dispense Refill    levothyroxine (SYNTHROID) 75 MCG tablet Take 1 tablet by mouth every morning.      linaCLOtide (LINZESS) 290 mcg Cap capsule Take 1 capsule (290 mcg total) by mouth before breakfast. 30 capsule 3    lisdexamfetamine (VYVANSE) 50 MG capsule Take 1 capsule (50 mg total) by mouth every morning. 30 capsule 0    lisdexamfetamine (VYVANSE) 70 MG capsule Take 1 capsule (70 mg total) by mouth every morning. 30 capsule 0    meloxicam (MOBIC) 7.5 MG tablet TAKE ONE TABLET BY MOUTH EVERY DAY 30 tablet 4    rOPINIRole (REQUIP) 0.5 MG tablet TAKE ONE TABLET BY MOUTH EVERY EVENING 30 tablet 11    azelastine (ASTELIN) 137 mcg (0.1 %) nasal spray 1 spray (137 mcg total) by Nasal route 2 (two) times daily. 30 mL 0    fluticasone propionate (FLONASE) 50 mcg/actuation nasal spray 2 sprays (100 mcg total) by Each Nostril route once daily. 16 g 11    Lactobac no.41/Bifidobact no.7 (PROBIOTIC-10 ORAL) Take by mouth.      lisdexamfetamine (VYVANSE) 70 MG capsule Take 1 capsule (70 mg total) by mouth every morning. 30 capsule 0    tiZANidine (ZANAFLEX) 4 MG tablet TAKE ONE TABLET BY MOUTH EVERY 8 HOURS 30 tablet 1    valACYclovir (VALTREX) 1000 MG tablet Take 1 tablet by mouth once daily.      VYVANSE 70 mg capsule TAKE ONE CAPSULE BY MOUTH EVERY MORNING 30  capsule 0    XIIDRA 5 % Dpet          Physical Exam:  Vital Signs:   Vitals:    12/02/24 1216   BP: 128/79   Pulse: 93   Resp: 16   Temp: 97.6 °F (36.4 °C)     General Appearance: Well appearing in no acute distress  ENT: OP clear  Chest: CTA B  CV: RRR, no m/r/g  Abd: s/nt/nd/nabs  Ext: no edema    Labs:  Reviewed    IMP:  Active Hospital Problems    Diagnosis  POA    *GERD (gastroesophageal reflux disease) [K21.9]  Yes    Dysphagia [R13.10]  No    History of hiatal hernia [Z87.19]  Not Applicable    Hx of laparoscopic adjustable gastric banding [Z98.84]  Not Applicable      Resolved Hospital Problems   No resolved problems to display.         Plan:  I have explained the risks and benefits of endoscopy procedures to the patient including but not limited to bleeding, perforation, infection, and death. The patient wishes to proceed.

## 2024-12-02 NOTE — [employer] PATIENT INSTRUCTIONS
Discharge Summary/Instructions after an Endoscopic Procedure  Patient Name: Venice Orosco  Patient MRN: [id number]  Patient YOB: 1975 Monday, December 2, 2024  Christie Cooper MD  Dear patient,  As a result of recent federal legislation (The Federal Cures Act), you may   receive lab or pathology results from your procedure in your [facility]   account before your physician is able to contact you. Your physician or   their representative will relay the results to you with their   recommendations at their soonest availability.  Thank you,  RESTRICTIONS:  During your procedure today, you received medications for sedation.  These   medications may affect your judgment, balance and coordination.  Therefore,   for 24 hours, you have the following restrictions:   - DO NOT drive a car, operate machinery, make legal/financial decisions,   sign important papers or drink alcohol.    ACTIVITY:  Today: no heavy lifting, straining or running due to procedural   sedation/anesthesia.  The following day: return to full activity including work.  DIET:  Eat and drink normally unless instructed otherwise.     TREATMENT FOR COMMON SIDE EFFECTS:  - Mild abdominal pain, nausea, belching, bloating or excessive gas:  rest,   eat lightly and use a heating pad.  - Sore Throat: treat with throat lozenges and/or gargle with warm salt   water.  - Because air was used during the procedure, expelling large amounts of air   from your rectum or belching is normal.  - If a bowel prep was taken, you may not have a bowel movement for 1-3 days.    This is normal.  SYMPTOMS TO WATCH FOR AND REPORT TO YOUR PHYSICIAN:  1. Abdominal pain or bloating, other than gas cramps.  2. Chest pain.  3. Back pain.  4. Signs of infection such as: chills or fever occurring within 24 hours   after the procedure.  5. Rectal bleeding, which would show as bright red, maroon, or black stools.   (A tablespoon of blood from the rectum is not serious,  especially if   hemorrhoids are present.)  6. Vomiting.  7. Weakness or dizziness.  GO DIRECTLY TO THE NEAREST EMERGENCY ROOM IF YOU HAVE ANY OF THE FOLLOWING:      Difficulty breathing              Chills and/or fever over 101 F   Persistent vomiting and/or vomiting blood   Severe abdominal pain   Severe chest pain   Black, tarry stools   Bleeding- more than one tablespoon   Any other symptom or condition that you feel may need urgent attention  Your doctor recommends these additional instructions:  If any biopsies were taken, your doctors clinic will contact you in 1 to 2   weeks with any results.  - Discharge patient to home (via wheelchair).   - Resume previous diet.   - Continue present medications.   - Await pathology results.   - Patient has a contact number available for emergencies.  The signs and   symptoms of potential delayed complications were discussed with the   patient.  Return to normal activities tomorrow.  Written discharge   instructions were provided to the patient.   - Resume anticoagulant at prior dose.  For questions, problems or results please call your physician Christie Cooper MD at Work:  [phone]  If you have any questions about the above instructions, call the GI   department at [phone] or call the endoscopy unit at [phone]   from 7am until 3 pm.  [facility], EMERGENCY ROOM PHONE NUMBER:   [phone]  IF A COMPLICATION OR EMERGENCY SITUATION ARISES AND YOU ARE UNABLE TO REACH   YOUR PHYSICIAN - GO DIRECTLY TO THE EMERGENCY ROOM.  I have read or have had read to me these discharge instructions for my   procedure and have received a written copy.  I understand these   instructions and will follow-up with my physician if I have any questions.     __________________________________       _____________________________________  Nurse Signature                                          Patient/Designated   Responsible Party Signature  Christie  MD Christie Cooper MD  12/2/2024 12:55:33 PM  [employer]

## 2024-12-02 NOTE — DISCHARGE SUMMARY
[facility] - Endoscopy 1st Fl  Discharge Note  Short Stay    Procedure(s) (LRB):  EGD (ESOPHAGOGASTRODUODENOSCOPY) (N/A)  MANOMETRY, ANORECTAL (N/A)      OUTCOME: Patient tolerated treatment/procedure well without complication and is now ready for discharge.    DISPOSITION: Home or Self Care    FINAL DIAGNOSIS:  GERD (gastroesophageal reflux disease)    FOLLOWUP: With primary care provider    DISCHARGE INSTRUCTIONS:  No discharge procedures on file.

## 2024-12-02 NOTE — TRANSFER OF CARE
"Anesthesia Transfer of Care Note    Patient: Venice Orosco    Procedure(s) Performed: Procedure(s) (LRB):  EGD (ESOPHAGOGASTRODUODENOSCOPY) (N/A)  MANOMETRY, ANORECTAL (N/A)    Patient location: PACU    Anesthesia Type: general    Transport from OR: Transported from OR on room air with adequate spontaneous ventilation    Post pain: adequate analgesia    Post assessment: no apparent anesthetic complications and tolerated procedure well    Post vital signs: stable    Level of consciousness: awake    Nausea/Vomiting: no nausea/vomiting    Complications: none    Transfer of care protocol was followed      Last vitals: Visit Vitals  /71 (BP Location: Left arm, Patient Position: Lying)   Pulse 81   Temp 36.1 °C (97 °F) (Temporal)   Resp 19   Ht 5' 9" (1.753 m)   Wt 85.6 kg (188 lb 9.7 oz)   SpO2 99%   Breastfeeding No   BMI 27.85 kg/m²     "

## 2024-12-02 NOTE — PLAN OF CARE
Discharge instructions reviewed with pt, handouts given, verbalized understanding with no further questions at this time. MD telephone number provided per AVS sheet. No pain or nausea noted, tolerating po fluids without difficulty, no other complaints noted. Transfer pt for anorectal manometry via wheelchair per ROM Rich. Pt tolerated well.

## 2024-12-02 NOTE — TELEPHONE ENCOUNTER
No care due was identified.  Health [facility] Embedded Care Due Messages. Reference number: [id number].   12/02/2024 2:34:28 PM CST

## 2024-12-02 NOTE — ANESTHESIA POSTPROCEDURE EVALUATION
Anesthesia Post Evaluation    Patient: Venice Orosco    Procedure(s) Performed: Procedure(s) (LRB):  EGD (ESOPHAGOGASTRODUODENOSCOPY) (N/A)  MANOMETRY, ANORECTAL (N/A)    Final Anesthesia Type: general      Patient location during evaluation: PACU  Patient participation: Yes- Able to Participate  Level of consciousness: awake and alert and oriented  Post-procedure vital signs: reviewed and stable  Pain management: adequate  Airway patency: patent    PONV status at discharge: No PONV  Anesthetic complications: no      Cardiovascular status: blood pressure returned to baseline, stable and hemodynamically stable  Respiratory status: unassisted  Hydration status: euvolemic  Follow-up not needed.              Vitals Value Taken Time   /78 12/02/24 1322   Temp 36.1 °C (97 °F) 12/02/24 1256   Pulse 79 12/02/24 1322   Resp 20 12/02/24 1322   SpO2 100 % 12/02/24 1322         Event Time   Out of Recovery 13:20:00         Pain/Adam Score: Adam Score: 10 (12/2/2024  1:22 PM)

## 2024-12-05 LAB
COMMENT: NORMAL
FINAL PATHOLOGIC DIAGNOSIS: NORMAL
GROSS: NORMAL
Lab: NORMAL
MICROSCOPIC EXAM: NORMAL

## 2024-12-11 ENCOUNTER — TELEPHONE (OUTPATIENT)
Dept: FAMILY MEDICINE | Facility: CLINIC | Age: 49
End: 2024-12-11
Payer: COMMERCIAL

## 2024-12-11 ENCOUNTER — PATIENT MESSAGE (OUTPATIENT)
Dept: FAMILY MEDICINE | Facility: CLINIC | Age: 49
End: 2024-12-11

## 2024-12-11 NOTE — TELEPHONE ENCOUNTER
----- Message from Cassia sent at 12/11/2024 11:25 AM CST -----  Contact: Venice  Venice would like a call back at in regards to being unable to log onto the virtual visit due to the [employer] keyana being down at this time. Please call her at [phone].  thanks   am

## 2024-12-11 NOTE — TELEPHONE ENCOUNTER
She called to say that she couldn't log in because the [facility] keyana was down, can I add her back in this afternoon?

## 2024-12-12 NOTE — TELEPHONE ENCOUNTER
She is asking if she can do one more virtual so she can get her medication refilled and she will come in next time. She has had 2 virtuals . She said with the holidays and she is about to have surgery

## 2024-12-27 ENCOUNTER — PATIENT MESSAGE (OUTPATIENT)
Dept: FAMILY MEDICINE | Facility: CLINIC | Age: 49
End: 2024-12-27
Payer: COMMERCIAL

## 2025-01-06 ENCOUNTER — PATIENT MESSAGE (OUTPATIENT)
Dept: GASTROENTEROLOGY | Facility: CLINIC | Age: 50
End: 2025-01-06
Payer: COMMERCIAL

## 2025-01-16 DIAGNOSIS — K59.04 CHRONIC IDIOPATHIC CONSTIPATION: Primary | ICD-10-CM

## 2025-01-17 ENCOUNTER — TELEPHONE (OUTPATIENT)
Dept: GASTROENTEROLOGY | Facility: CLINIC | Age: 50
End: 2025-01-17
Payer: COMMERCIAL

## 2025-01-17 NOTE — TELEPHONE ENCOUNTER
Spoke with patient on [phone] in regard to results to which she understood. Patient states she did not fail the balloon expulsion portion of test, she just did not do it. Patient states she does not want to get the Physical Therapy appointment right now because she just had surgery and preparing to have another that she believes will help. Patient states she will hold off until then for the physical therapy. Provider made aware.    ----- Message from Christie Cooper MD sent at 1/16/2025  2:00 PM CST -----  Regarding: Physical Therapy Referral/Anorectal Manometry Results  Hi,    Patient's anorectal manometry was most consistent with irritable bowel syndrome but she due to her failing balloon expulsion portion of test, it was recommended that physical therapy may be helpful. I have placed PT referral for her. Please inform patient and assist with scheduling. Thanks

## 2025-02-03 ENCOUNTER — OFFICE VISIT (OUTPATIENT)
Dept: FAMILY MEDICINE | Facility: CLINIC | Age: 50
End: 2025-02-03
Payer: COMMERCIAL

## 2025-02-03 VITALS
HEIGHT: 69 IN | BODY MASS INDEX: 29.29 KG/M2 | WEIGHT: 197.75 LBS | OXYGEN SATURATION: 97 % | SYSTOLIC BLOOD PRESSURE: 122 MMHG | DIASTOLIC BLOOD PRESSURE: 86 MMHG | HEART RATE: 85 BPM

## 2025-02-03 DIAGNOSIS — F90.9 ADULT ADHD: ICD-10-CM

## 2025-02-03 DIAGNOSIS — K59.04 CHRONIC IDIOPATHIC CONSTIPATION: ICD-10-CM

## 2025-02-03 DIAGNOSIS — Z00.00 WELL ADULT EXAM: Primary | ICD-10-CM

## 2025-02-03 DIAGNOSIS — Z91.89 AT HIGH RISK FOR BREAST CANCER: ICD-10-CM

## 2025-02-03 DIAGNOSIS — Z12.2 ENCOUNTER FOR SCREENING FOR LUNG CANCER: ICD-10-CM

## 2025-02-03 PROCEDURE — G0296 VISIT TO DETERM LDCT ELIG: HCPCS | Mod: S$GLB,,, | Performed by: FAMILY MEDICINE

## 2025-02-03 PROCEDURE — 99999 PR PBB SHADOW E&M-EST. PATIENT-LVL III: CPT | Mod: PBBFAC,,, | Performed by: FAMILY MEDICINE

## 2025-02-03 PROCEDURE — 99214 OFFICE O/P EST MOD 30 MIN: CPT | Mod: 25,S$GLB,, | Performed by: FAMILY MEDICINE

## 2025-02-03 PROCEDURE — 3074F SYST BP LT 130 MM HG: CPT | Mod: CPTII,S$GLB,, | Performed by: FAMILY MEDICINE

## 2025-02-03 PROCEDURE — 99396 PREV VISIT EST AGE 40-64: CPT | Mod: 25,S$GLB,, | Performed by: FAMILY MEDICINE

## 2025-02-03 PROCEDURE — 3079F DIAST BP 80-89 MM HG: CPT | Mod: CPTII,S$GLB,, | Performed by: FAMILY MEDICINE

## 2025-02-03 PROCEDURE — 3008F BODY MASS INDEX DOCD: CPT | Mod: CPTII,S$GLB,, | Performed by: FAMILY MEDICINE

## 2025-02-03 RX ORDER — LISDEXAMFETAMINE DIMESYLATE 50 MG/1
50 CAPSULE ORAL EVERY MORNING
Qty: 30 CAPSULE | Refills: 0 | Status: SHIPPED | OUTPATIENT
Start: 2025-02-03 | End: 2025-02-24 | Stop reason: SDUPTHER

## 2025-02-03 RX ORDER — MELOXICAM 15 MG/1
15 TABLET ORAL DAILY
COMMUNITY

## 2025-02-03 NOTE — PROGRESS NOTES
Chief Complaint:    Chief Complaint   Patient presents with    Medication Refill       History of Present Illness:    History of Present Illness    Patient presents today for medication follow-up She reports that 40mg of her medication works well but feels insufficient to carry her through. She experienced insomnia with a higher dose. She has had issues with certain generic versions causing severe dry mouth and excessive thirst without therapeutic effect, and has notified her pharmacy not to dispense these versions. She is currently taking Linzess but reports difficulty affording it and has a new prescription for a lower dosage. She underwent laparoscopic band removal and reflux repair surgery last month. She had a thyroidectomy last year. 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 Her elbow carpal tunnel and thumb arthritis will not be addressed during this procedure. She has a significant family history of cancer. Her maternal family history includes breast cancer affecting her mother, grandmother, and aunt. Her paternal grandmother  at age 56 from bone cancer of unknown primary site. Her paternal grandmother's sisters also  around age 56 from various cancers, types unknown. She reports her paternal aunt had cancer that she believes is not hereditary. She is a former smoker who quit in 2015 after smoking one pack per day for approximately 20 years.      ROS:  General: denies fever, denies chills, denies fatigue, denies weight gain, denies weight loss, denies loss of appetite  Eyes: denies vision changes, denies blurry vision, denies eye pain, denies eye discharge  ENT: denies ear pain, denies hearing loss, denies tinnitus, denies nasal congestion, denies sore throat, admits dry mouth  Cardiovascular: denies chest pain, denies palpitations, denies lower extremity edema  Respiratory: denies cough, denies shortness of breath, denies wheezing, denies  sputum production  Endocrine: denies polyuria, denies polydipsia, denies heat intolerance, denies cold intolerance  Gastrointestinal: denies abdominal pain, denies heartburn, denies nausea, denies vomiting, denies diarrhea, denies constipation, denies blood in stool  Genitourinary: denies dysuria, denies urgency, denies frequency, denies hematuria, denies nocturia, denies incontinence  Heme & Lymphatic: denies easy or excessive bleeding, denies easy bruising, denies swollen lymph nodes  Musculoskeletal: denies muscle pain, denies back pain, denies joint pain, denies joint swelling  Skin: denies rash, denies lesion, denies itching, denies skin texture changes, denies skin color changes  Neurological: denies headache, denies dizziness, denies numbness, denies tingling, denies seizure activity, denies speech difficulty, denies memory loss, denies confusion  Psychiatric: denies anxiety, denies depression, admits sleep difficulty           Past Medical History:   Diagnosis Date    DDD (degenerative disc disease), lumbar     Fatty liver     Nicotine abuse        Social History:  Social History     Socioeconomic History    Marital status:    Occupational History     Employer: homemaker   Tobacco Use    Smoking status: Former     Current packs/day: 0.00     Types: Cigarettes     Quit date: 10/7/2015     Years since quittin.3    Smokeless tobacco: Never   Substance and Sexual Activity    Alcohol use: Yes     Alcohol/week: 0.0 standard drinks of alcohol     Comment: occasionally     Drug use: No    Sexual activity: Yes     Partners: Male     Birth control/protection: None     Social Drivers of Health     Financial Resource Strain: Patient Declined (2025)    Overall Financial Resource Strain (CARDIA)     Difficulty of Paying Living Expenses: Patient declined   Food Insecurity: Patient Declined (2025)    Hunger Vital Sign     Worried About Running Out of Food in the Last Year: Patient declined     Ran Out of  "Food in the Last Year: Patient declined   Transportation Needs: Patient Declined (12/18/2024)    Received from [facility] and Its Subsidiaries and Affiliates    PRAPARE - Transportation     Lack of Transportation (Medical): Patient declined     Lack of Transportation (Non-Medical): Patient declined   Physical Activity: Unknown (11/15/2023)    Exercise Vital Sign     Days of Exercise per Week: Patient declined   Stress: No Stress Concern Present (2/2/2025)    [employer] [place] [employer] - Occupational Stress Questionnaire     Feeling of Stress : Not at all   Housing Stability: Unknown (2/2/2025)    Housing Stability Vital Sign     Unable to Pay for Housing in the Last Year: Patient declined       Family History:   family history includes Breast cancer in her maternal aunt and maternal grandmother; Breast cancer (age of onset: 50) in her mother; Cancer in her maternal grandmother; Diabetes in her cousin; Heart attack in her maternal grandmother; Hypertension in her maternal grandmother.    Health Maintenance   Topic Date Due    Hemoglobin A1c (Diabetic Prevention Screening)  12/15/2019    TETANUS VACCINE  03/15/2023    Influenza Vaccine (1) 09/01/2024    COVID-19 Vaccine (1 - 2024-25 season) Never done    Shingles Vaccine (1 of 2) Never done    Pneumococcal Vaccines (Age 50+) (1 of 1 - PCV) Never done    Mammogram  04/30/2025    Cervical Cancer Screening  06/15/2026    Lipid Panel  11/15/2028    Colorectal Cancer Screening  08/16/2029    RSV Vaccine (Age 60+ and Pregnant patients) (1 - 1-dose 75+ series) 01/18/2050    Hepatitis C Screening  Completed    HIV Screening  Completed       Exam:Physical     Vital Signs  Pulse: 85  SpO2: 97 %  BP: 122/86  BP Location: Left arm  Patient Position: Sitting  Pain Score: 0-No pain  Height and Weight  Height: 5' 9" (175.3 cm)  Weight: 89.7 kg (197 lb 12 oz)  BSA (Calculated - sq m): 2.09 sq meters  BMI (Calculated): 29.2  Weight " in (lb) to have BMI = 25: 168.9]    Body mass index is 29.2 kg/m².    Physical Exam    General: Well-developed. Well-nourished. No acute distress.  Eyes: EOMI. Sclerae anicteric.  HENT: Normocephalic. Atraumatic. Nares patent. Moist oral mucosa.  Cardiovascular: Regular rate. Regular rhythm. No murmurs. No rubs. No gallops. Normal S1, S2.  Respiratory: Normal respiratory effort. Clear to auscultation bilaterally. No rales. No rhonchi. No wheezing.  Musculoskeletal: No  obvious deformity.  Extremities: No lower extremity edema.  Neurological: Alert & oriented x3. No slurred speech. Normal gait.  Psychiatric: Normal mood. Normal affect. Good insight. Good judgment.  Skin: Warm. Dry. No rash.           Assessment:        ICD-10-CM ICD-9-CM   1. Well adult exam  Z00.00 V70.0   2. Adult ADHD  F90.9 314.01   3. At high risk for breast cancer  Z91.89 V49.89   4. Encounter for screening for lung cancer  Z12.2 V76.0   5. Chronic idiopathic constipation  K59.04 564.00         Plan:    Assessment & Plan    MEDICAL DECISION MAKING:  - Evaluated medication regimen, considering recent surgeries and ongoing health issues  - Assessed effectiveness and side effects of current medications, noting sleep disturbances with higher Vyvanse dosage  - Considered family history of cancer and need for appropriate screenings  - Reviewed smoking history and determined need for lung cancer screening  - Discussed management of constipation, considering alternatives to Linzess due to cost concerns  - Evaluated need for various vaccinations based on patient's age and medical history    PATIENT EDUCATION:  - Explained importance and purpose of tetanus vaccine, protecting against tetanus, whooping cough, and diphtheria  - Discussed shingles vaccine, its importance for those who had chickenpox, and potential complications like post-herpetic neuralgia  - Provided information on lung cancer screening recommendations for former smokers  - Explained  current limitations and costs associated with comprehensive cancer screening tests  - Discussed rationale behind cancer screening programs and their cost-effectiveness in population health    ACTION ITEMS/LIFESTYLE:  - Patient to research shingles and tetanus vaccinations before making a decision    MEDICATIONS:  - Started Vyvanse 50mg, trial for 1 month  - Discontinued Vyvanse 70mg due to side effects (insomnia)  - Continued Vitamin D3 supplementation    ORDERS:  - Low-dose CT of chest ordered for lung cancer screening (to be scheduled after hand surgery)    REFERRALS:  - Referred to breast specialist for evaluation due to family history of cancer    FOLLOW UP:  - Contact the office in a few days before running out of Vyvanse 50mg to report effectiveness and discuss potential dosage adjustments  - Message if Lactulose prescription is needed after finishing current Linzess supply         Venice was seen today for medication refill.    Diagnoses and all orders for this visit:    Well adult exam    Adult ADHD  -     lisdexamfetamine (VYVANSE) 50 MG capsule; Take 1 capsule (50 mg total) by mouth every morning.    At high risk for breast cancer  -     Ambulatory referral/consult to Breast Surgery; Future    Encounter for screening for lung cancer  -     CT Chest Lung Screening Low Dose; Future    Chronic idiopathic constipation        No follow-ups on file.      Jennifer Summers MD

## 2025-02-10 ENCOUNTER — PATIENT MESSAGE (OUTPATIENT)
Dept: SURGERY | Facility: CLINIC | Age: 50
End: 2025-02-10
Payer: COMMERCIAL

## 2025-02-24 ENCOUNTER — PATIENT MESSAGE (OUTPATIENT)
Dept: FAMILY MEDICINE | Facility: CLINIC | Age: 50
End: 2025-02-24
Payer: COMMERCIAL

## 2025-02-24 ENCOUNTER — TELEPHONE (OUTPATIENT)
Dept: GASTROENTEROLOGY | Facility: CLINIC | Age: 50
End: 2025-02-24
Payer: COMMERCIAL

## 2025-02-24 DIAGNOSIS — F90.9 ADULT ADHD: ICD-10-CM

## 2025-02-24 RX ORDER — LISDEXAMFETAMINE DIMESYLATE 50 MG/1
50 CAPSULE ORAL EVERY MORNING
Qty: 30 CAPSULE | Refills: 0 | Status: SHIPPED | OUTPATIENT
Start: 2025-02-27

## 2025-02-24 NOTE — TELEPHONE ENCOUNTER
Spoke with patient in regard to inquiry of esophagus stretch. Patient informed appointment with provider is needed to get the order placed for the stretch. Patient states Dr. Owusu placed an urgent referral for the stretch to be completed. Message sent to the provider for further recommendations.     ----- Message from Rina sent at 2/24/2025  3:49 PM CST -----  Contact: Venice  .Type:  Needs Medical AdviceWho Called:  Venice Would the patient rather a call back or a response via [facility]?  Call back Best Call Back Number:   [phone] Information:  Pt is calling in regards to getting a call back to discuss the status of a urgent  referral that was sent over on today by Dr Diego Owusu.  If any question please call Dr Owusu at [phone]

## 2025-02-25 ENCOUNTER — PATIENT MESSAGE (OUTPATIENT)
Dept: GASTROENTEROLOGY | Facility: CLINIC | Age: 50
End: 2025-02-25
Payer: COMMERCIAL

## 2025-03-20 DIAGNOSIS — F90.9 ADULT ADHD: ICD-10-CM

## 2025-03-20 RX ORDER — PANTOPRAZOLE SODIUM 40 MG/1
40 TABLET, DELAYED RELEASE ORAL
Qty: 30 TABLET | Refills: 3 | Status: SHIPPED | OUTPATIENT
Start: 2025-03-20

## 2025-03-20 RX ORDER — LISDEXAMFETAMINE DIMESYLATE 50 MG/1
50 CAPSULE ORAL EVERY MORNING
Qty: 30 CAPSULE | Refills: 0 | Status: SHIPPED | OUTPATIENT
Start: 2025-03-20

## 2025-03-20 NOTE — TELEPHONE ENCOUNTER
No care due was identified.  [facility] Embedded Care Due Messages. Reference number: [id number].   3/20/2025 4:08:52 PM CDT

## 2025-04-22 DIAGNOSIS — F90.9 ADULT ADHD: ICD-10-CM

## 2025-04-22 RX ORDER — LISDEXAMFETAMINE DIMESYLATE 50 MG/1
50 CAPSULE ORAL EVERY MORNING
Qty: 30 CAPSULE | Refills: 0 | Status: SHIPPED | OUTPATIENT
Start: 2025-04-22

## 2025-04-22 NOTE — TELEPHONE ENCOUNTER
No care due was identified.  [facility] Embedded Care Due Messages. Reference number: [id number].   4/22/2025 1:38:20 PM CDT

## 2025-04-24 ENCOUNTER — OFFICE VISIT (OUTPATIENT)
Dept: GASTROENTEROLOGY | Facility: CLINIC | Age: 50
End: 2025-04-24
Payer: COMMERCIAL

## 2025-04-24 VITALS — HEIGHT: 69 IN | BODY MASS INDEX: 29.29 KG/M2 | WEIGHT: 197.75 LBS

## 2025-04-24 DIAGNOSIS — R11.10 REGURGITATION OF FOOD: Primary | ICD-10-CM

## 2025-04-24 DIAGNOSIS — Z98.84 HISTORY OF REMOVAL OF LAPAROSCOPIC GASTRIC BANDING DEVICE: ICD-10-CM

## 2025-04-24 DIAGNOSIS — R11.11 VOMITING WITHOUT NAUSEA, UNSPECIFIED VOMITING TYPE: ICD-10-CM

## 2025-04-24 NOTE — PROGRESS NOTES
[facility]  Gastroenterology      PCP: Jennifer Summers MD    4/24/25    The patient location is: Home  The chief complaint leading to consultation is: Home    Visit type: audiovisual    Face to Face time with patient: 20 minutes of total time spent on the encounter, which includes face to face time and non-face to face time preparing to see the patient (eg, review of tests), Obtaining and/or reviewing separately obtained history, Documenting clinical information in the electronic or other health record, Independently interpreting results (not separately reported) and communicating results to the patient/family/caregiver, or Care coordination (not separately reported).         Each patient to whom he or she provides medical services by telemedicine is:  (1) informed of the relationship between the physician and patient and the respective role of any other health care provider with respect to management of the patient; and (2) notified that he or she may decline to receive medical services by telemedicine and may withdraw from such care at any time.    Notes:       Subjective:   Venice Orosco is a 50 y.o. female here for f/u GERD and dysphagia. She had an EGD in 12/2024 that showed Grade A esophagitis, hiatal hernia. She subsequently underwent lap band removal, anti reflux surgery and hiatal hernia repair in 02/2024. She struggled with continued reflux and dysphagia after this and her surgeon wanted her to get repeat EGD with possible dilation and suspected stricture versus motility issue. Of note, patient is also on Ozempic on and off. States her surgeon said sometimes the area where the lap band was removed can stay restricted. Issues with regurgitation and vomiting worse at night. Certain foods like hamburger meat can be trigger for this.       Past Medical History:   Diagnosis Date    DDD (degenerative disc disease), lumbar     Fatty liver     Nicotine abuse        Past Surgical History:    Procedure Laterality Date    ANORECTAL MANOMETRY N/A 2024    Procedure: MANOMETRY, ANORECTAL;  Surgeon: Christie Cooper MD;  Location: [facility];  Service: Endoscopy;  Laterality: N/A;    AUGMENTATION OF BREAST      saline    BREAST SURGERY Bilateral     2010     SECTION      COLONOSCOPY N/A 2024    Procedure: COLONOSCOPY;  Surgeon: Christie Cooper MD;  Location[facility];  Service: Endoscopy;  Laterality: N/A;    ESOPHAGOGASTRODUODENOSCOPY N/A 2024    Procedure: EGD (ESOPHAGOGASTRODUODENOSCOPY);  Surgeon: Christie Cooper MD;  Location: [facility] ENDO;  Service: Endoscopy;  Laterality: N/A;    INJECTION OF ANESTHETIC AGENT INTO SACROILIAC JOINT Left 12/15/2021    Procedure: Left SIJ Injection and Benito trigger point injections to the cervical myofascial area;  Surgeon: Bon Valdez MD;  Location: [facility];  Service: Pain Management;  Laterality: Left;    LAPAROSCOPIC GASTRIC BANDING      LAPAROSCOPIC GASTRIC BANDING Left        Medications Ordered Prior to Encounter[1]    Review of patient's allergies indicates:   Allergen Reactions    Penicillins Itching, Rash, Swelling and Other (See Comments)       Social History[2]    Family History   Problem Relation Name Age of Onset    Breast cancer Mother  50    Breast cancer Maternal Aunt      Cancer Maternal Grandmother      Hypertension Maternal Grandmother      Heart attack Maternal Grandmother      Breast cancer Maternal Grandmother      Diabetes Cousin         Review of Systems   Constitutional:  Negative for appetite change, fever and unexpected weight change.   HENT:  Negative for postnasal drip, rhinorrhea, sneezing, sore throat and trouble swallowing.    Eyes:  Negative for visual disturbance.   Respiratory:  Negative for cough, shortness of breath and wheezing.    Cardiovascular:  Negative for chest pain, palpitations and leg swelling.   Gastrointestinal:  Negative for abdominal pain, blood in stool,  constipation, diarrhea, nausea and vomiting.   Genitourinary:  Negative for dysuria.   Musculoskeletal:  Negative for arthralgias, joint swelling and myalgias.   Skin:  Negative for color change, pallor and rash.   Neurological:  Negative for weakness, light-headedness, numbness and headaches.   Hematological:  Negative for adenopathy. Does not bruise/bleed easily.   Psychiatric/Behavioral:  Negative for agitation.      Objective:   Vitals: There were no vitals filed for this visit.    Physical Exam Unable to perform due to video visit    IMPRESSION     Problem List Items Addressed This Visit    None  Visit Diagnoses         Regurgitation of food    -  Primary    Relevant Orders    Ambulatory referral/consult to Endo Procedure       Vomiting without nausea, unspecified vomiting type        Relevant Orders    Ambulatory referral/consult to Endo Procedure       History of removal of laparoscopic gastric banding device        Relevant Orders    Ambulatory referral/consult to Endo Procedure             PLANS:    - Schedule for EGD with possible dilation  - Continue current medications  - Further recommendations pending the above    Regurgitation of food  -     Ambulatory referral/consult to Endo Procedure ; Future; Expected date: 04/25/2025    Vomiting without nausea, unspecified vomiting type  -     Ambulatory referral/consult to Endo Procedure ; Future; Expected date: 04/25/2025    History of removal of laparoscopic gastric banding device  -     Ambulatory referral/consult to Endo Procedure ; Future; Expected date: 04/25/2025        Chirstie Cooper MD  Gastroenterology and Hepatology             [1]   Current Outpatient Medications on File Prior to Visit   Medication Sig Dispense Refill    azelastine (ASTELIN) 137 mcg (0.1 %) nasal spray 1 spray (137 mcg total) by Nasal route 2 (two) times daily. 30 mL 0    fluticasone propionate (FLONASE) 50 mcg/actuation nasal  spray 2 sprays (100 mcg total) by Each Nostril route once daily. 16 g 11    Lactobac no.41/Bifidobact no.7 (PROBIOTIC-10 ORAL) Take by mouth.      levothyroxine (SYNTHROID) 75 MCG tablet Take 1 tablet by mouth every morning.      linaCLOtide (LINZESS) 145 mcg Cap capsule Take 145 mcg by mouth.      lisdexamfetamine (VYVANSE) 50 MG capsule TAKE 1 capsule BY MOUTH EVERY MORNING 30 capsule 0    meloxicam (MOBIC) 15 MG tablet Take 15 mg by mouth once daily.      pantoprazole (PROTONIX) 40 MG tablet TAKE 1 TABLET BY MOUTH ONCE DAILY 30 tablet 3    rOPINIRole (REQUIP) 0.5 MG tablet TAKE ONE TABLET BY MOUTH EVERY EVENING 30 tablet 11    tiZANidine (ZANAFLEX) 4 MG tablet TAKE ONE TABLET BY MOUTH EVERY 8 HOURS 30 tablet 1    valACYclovir (VALTREX) 1000 MG tablet Take 1 tablet by mouth once daily.      XIIDRA 5 % Dpet        No current facility-administered medications on file prior to visit.   [2]   Social History  Socioeconomic History    Marital status:    Occupational History     Employer: homemaker   Tobacco Use    Smoking status: Former     Current packs/day: 0.00     Types: Cigarettes     Quit date: 10/7/2015     Years since quittin.5    Smokeless tobacco: Never   Substance and Sexual Activity    Alcohol use: Yes     Alcohol/week: 0.0 standard drinks of alcohol     Comment: occasionally     Drug use: No    Sexual activity: Yes     Partners: Male     Birth control/protection: None     Social Drivers of Health     Financial Resource Strain: Patient Declined (2025)    Overall Financial Resource Strain (CARDIA)     Difficulty of Paying Living Expenses: Patient declined   Food Insecurity: Patient Declined (2025)    Hunger Vital Sign     Worried About Running Out of Food in the Last Year: Patient declined     Ran Out of Food in the Last Year: Patient declined   Transportation Needs: Patient Declined (2024)    [facility] Its Subsidiaries and  Affiliates    PRAPARE - Transportation     Lack of Transportation (Medical): Patient declined     Lack of Transportation (Non-Medical): Patient declined   Physical Activity: Unknown (11/15/2023)    Exercise Vital Sign     Days of Exercise per Week: Patient declined   Stress: No Stress Concern Present (2/2/2025)    [employer] [place] [employer] - Occupational Stress Questionnaire     Feeling of Stress : Not at all   Housing Stability: Unknown (2/2/2025)    Housing Stability Vital Sign     Unable to Pay for Housing in the Last Year: Patient declined

## 2025-04-30 ENCOUNTER — HOSPITAL ENCOUNTER (OUTPATIENT)
Dept: PREADMISSION TESTING | Facility: HOSPITAL | Age: 50
Discharge: HOME | End: 2025-04-30
Attending: INTERNAL MEDICINE
Payer: COMMERCIAL

## 2025-04-30 DIAGNOSIS — R11.11 VOMITING WITHOUT NAUSEA, UNSPECIFIED VOMITING TYPE: ICD-10-CM

## 2025-04-30 DIAGNOSIS — Z98.84 HISTORY OF REMOVAL OF LAPAROSCOPIC GASTRIC BANDING DEVICE: ICD-10-CM

## 2025-04-30 DIAGNOSIS — R11.10 REGURGITATION OF FOOD: Primary | ICD-10-CM

## 2025-05-08 ENCOUNTER — ANESTHESIA EVENT (OUTPATIENT)
Dept: ENDOSCOPY | Facility: HOSPITAL | Age: 50
End: 2025-05-08
Payer: COMMERCIAL

## 2025-05-08 NOTE — ANESTHESIA PREPROCEDURE EVALUATION
05/08/2025  Venice Orosco is a 50 y.o., female.      Pre-op Assessment    I have reviewed the Patient Summary Reports.     I have reviewed the Nursing Notes. I have reviewed the NPO Status.   I have reviewed the Medications.     Review of Systems  Anesthesia Hx:  No problems with previous Anesthesia             Denies Family Hx of Anesthesia complications.    Denies Personal Hx of Anesthesia complications.                    Social:  Non-Smoker, Alcohol Use       Hepatic/GI:     GERD Liver Disease,  Regurgitation of food s/p lap gastric banding, N/V, fatty liver             Musculoskeletal:         Spine Disorders: lumbar Disc disease and Degenerative disease           Psych:  Psychiatric History   ADHD               Physical Exam  General: Well nourished, Cooperative, Alert and Oriented    Airway:  Mallampati: I   Mouth Opening: Normal  TM Distance: Normal  Tongue: Normal  Neck ROM: Normal ROM    Dental:  Intact        Anesthesia Plan  Type of Anesthesia, risks & benefits discussed:    Anesthesia Type: Gen Natural Airway  Intra-op Monitoring Plan: Standard ASA Monitors  Post Op Pain Control Plan: multimodal analgesia  Induction:  IV  Informed Consent: Informed consent signed with the Patient and all parties understand the risks and agree with anesthesia plan.  All questions answered. Patient consented to blood products? No  ASA Score: 2  Day of Surgery Review of History & Physical: H&P Update referred to the surgeon/provider.    Ready For Surgery From Anesthesia Perspective.     .

## 2025-05-12 ENCOUNTER — PATIENT MESSAGE (OUTPATIENT)
Dept: GASTROENTEROLOGY | Facility: CLINIC | Age: 50
End: 2025-05-12
Payer: COMMERCIAL

## 2025-05-12 ENCOUNTER — ANESTHESIA (OUTPATIENT)
Dept: ENDOSCOPY | Facility: HOSPITAL | Age: 50
End: 2025-05-12
Payer: COMMERCIAL

## 2025-05-12 ENCOUNTER — HOSPITAL ENCOUNTER (OUTPATIENT)
Dept: ENDOSCOPY | Facility: HOSPITAL | Age: 50
Discharge: HOME | End: 2025-05-12
Attending: INTERNAL MEDICINE | Admitting: INTERNAL MEDICINE
Payer: COMMERCIAL

## 2025-05-12 DIAGNOSIS — R11.2 NAUSEA AND VOMITING, UNSPECIFIED VOMITING TYPE: ICD-10-CM

## 2025-05-12 DIAGNOSIS — R11.11 VOMITING WITHOUT NAUSEA, UNSPECIFIED VOMITING TYPE: ICD-10-CM

## 2025-05-12 DIAGNOSIS — R11.10 REGURGITATION OF FOOD: Primary | ICD-10-CM

## 2025-05-12 DIAGNOSIS — Z98.84 HISTORY OF REMOVAL OF LAPAROSCOPIC GASTRIC BANDING DEVICE: ICD-10-CM

## 2025-05-12 PROCEDURE — 37000008 HC ANESTHESIA 1ST 15 MINUTES

## 2025-05-12 PROCEDURE — 63600175 PHARM REV CODE 636 W HCPCS: Performed by: NURSE ANESTHETIST, CERTIFIED REGISTERED

## 2025-05-12 PROCEDURE — 37000009 HC ANESTHESIA EA ADD 15 MINS

## 2025-05-12 PROCEDURE — 27201012 HC FORCEPS, HOT/COLD, DISP: Performed by: INTERNAL MEDICINE

## 2025-05-12 PROCEDURE — 88305 TISSUE EXAM BY PATHOLOGIST: CPT | Mod: TC | Performed by: INTERNAL MEDICINE

## 2025-05-12 RX ORDER — LIDOCAINE HYDROCHLORIDE 20 MG/ML
INJECTION INTRAVENOUS
Status: DISCONTINUED | OUTPATIENT
Start: 2025-05-12 | End: 2025-05-12

## 2025-05-12 RX ORDER — PROPOFOL 10 MG/ML
INJECTION, EMULSION INTRAVENOUS
Status: DISCONTINUED | OUTPATIENT
Start: 2025-05-12 | End: 2025-05-12

## 2025-05-12 RX ADMIN — PROPOFOL 100 MG: 10 INJECTION, EMULSION INTRAVENOUS at 09:05

## 2025-05-12 RX ADMIN — LIDOCAINE HYDROCHLORIDE 50 MG: 20 INJECTION INTRAVENOUS at 09:05

## 2025-05-12 RX ADMIN — PROPOFOL 40 MG: 10 INJECTION, EMULSION INTRAVENOUS at 09:05

## 2025-05-12 NOTE — ANESTHESIA POSTPROCEDURE EVALUATION
Anesthesia Post Evaluation    Patient: Venice Candelarioton    Procedure(s) Performed: * No procedures listed *    Final Anesthesia Type: general      Patient location during evaluation: PACU  Patient participation: Yes- Able to Participate  Level of consciousness: awake  Post-procedure vital signs: reviewed and stable  Pain management: adequate  Airway patency: patent    PONV status at discharge: No PONV  Anesthetic complications: no      Cardiovascular status: stable  Respiratory status: unassisted  Hydration status: euvolemic  Follow-up not needed.              Vitals Value Taken Time   /75 05/12/25 10:05   Temp 36.4 °C (97.6 °F) 05/12/25 09:55   Pulse 70 05/12/25 10:05   Resp 32 05/12/25 10:05   SpO2 100 % 05/12/25 10:05   Vitals shown include unfiled device data.      No case tracking events are documented in the log.      Pain/Adam Score: Adam Score: 10 (5/12/2025 10:05 AM)

## 2025-05-12 NOTE — TRANSFER OF CARE
"Anesthesia Transfer of Care Note    Patient: Venice Foster Threeton    Procedure(s) Performed: * No procedures listed *    Patient location: PACU    Anesthesia Type: general    Transport from OR: Transported from OR on room air with adequate spontaneous ventilation    Post pain: adequate analgesia    Post assessment: no apparent anesthetic complications and tolerated procedure well    Post vital signs: stable    Level of consciousness: awake    Nausea/Vomiting: no nausea/vomiting    Complications: none    Transfer of care protocol was followed      Last vitals: Visit Vitals  /79   Pulse 68   Temp 36.5 °C (97.7 °F)   Resp 18   Ht 5' 9" (1.753 m)   Wt 93.2 kg (205 lb 9.3 oz)   LMP  (LMP Unknown)   SpO2 100%   Breastfeeding No   BMI 30.36 kg/m²     "

## 2025-05-12 NOTE — DISCHARGE SUMMARY
[facility] - Endoscopy 1st Fl  Discharge Note  Short Stay    EGD      OUTCOME: Patient tolerated treatment/procedure well without complication and is now ready for discharge.    DISPOSITION: Home or Self Care    FINAL DIAGNOSIS:  Regurgitation of food    FOLLOWUP: With primary care provider    DISCHARGE INSTRUCTIONS:  No discharge procedures on file.

## 2025-05-12 NOTE — H&P
Short Stay Endoscopy History and Physical    PCP - Jennifer Summers MD    Procedure - EGD  ASA - per anesthesia  Mallampati - per anesthesia  History of Anesthesia problems - no  Family history Anesthesia problems -  no     HPI:  This is a 50 y.o. female here for evaluation of :   Active Hospital Problems    Diagnosis  POA    *Regurgitation of food [R11.10]  Yes    Nausea and vomiting [R11.2]  No    History of removal of laparoscopic gastric banding device [Z98.84]  Not Applicable      Resolved Hospital Problems   No resolved problems to display.         ROS:  CONSTITUTIONAL: Denies weight change,  fatigue, fevers, chills, night sweats.  CARDIOVASCULAR: Denies chest pain, shortness of breath, orthopnea and edema.  RESPIRATORY: Denies cough, hemoptysis, dyspnea, and wheezing.  GI: See HPI.    Medical History:   Past Medical History:   Diagnosis Date    DDD (degenerative disc disease), lumbar     Fatty liver     Nicotine abuse        Surgical History:   Past Surgical History:   Procedure Laterality Date    ANORECTAL MANOMETRY N/A 2024    Procedure: MANOMETRY, ANORECTAL;  Surgeon: Christie Cooper MD;  Location: [facility];  Service: Endoscopy;  Laterality: N/A;    AUGMENTATION OF BREAST      saline    BREAST SURGERY Bilateral     2010     SECTION      COLONOSCOPY N/A 2024    Procedure: COLONOSCOPY;  Surgeon: Christie Cooper MD;  Location: [facility];  Service: Endoscopy;  Laterality: N/A;    ESOPHAGOGASTRODUODENOSCOPY N/A 2024    Procedure: EGD (ESOPHAGOGASTRODUODENOSCOPY);  Surgeon: Christie Cooper MD;  Location: [facility];  Service: Endoscopy;  Laterality: N/A;    INJECTION OF ANESTHETIC AGENT INTO SACROILIAC JOINT Left 12/15/2021    Procedure: Left SIJ Injection and Benito trigger point injections to the cervical myofascial area;  Surgeon: Bon Valdez MD;  Location: [facility];  Service: Pain Management;  Laterality: Left;    LAPAROSCOPIC GASTRIC BANDING      LAPAROSCOPIC  GASTRIC BANDING Left        Family History:  Family History   Problem Relation Name Age of Onset    Breast cancer Mother  50    Breast cancer Maternal Aunt      Cancer Maternal Grandmother      Hypertension Maternal Grandmother      Heart attack Maternal Grandmother      Breast cancer Maternal Grandmother      Diabetes Cousin         Social History:   Social History[1]    Allergy  Review of patient's allergies indicates:   Allergen Reactions    Penicillins Itching, Rash, Swelling and Other (See Comments)       Medications:   Medications Ordered Prior to Encounter[2]    Physical Exam:  Vital Signs:   Vitals:    25 0840   BP: 120/79   Pulse:    Resp:    Temp:      General Appearance: Well appearing in no acute distress  ENT: OP clear  Chest: CTA B  CV: RRR, no m/r/g  Abd: s/nt/nd/nabs  Ext: no edema    Labs:  Reviewed    IMP:  Active Hospital Problems    Diagnosis  POA    *Regurgitation of food [R11.10]  Yes    Nausea and vomiting [R11.2]  No    History of removal of laparoscopic gastric banding device [Z98.84]  Not Applicable      Resolved Hospital Problems   No resolved problems to display.         Plan:  I have explained the risks and benefits of endoscopy procedures to the patient including but not limited to bleeding, perforation, infection, and death. The patient wishes to proceed.         [1]   Social History  Tobacco Use    Smoking status: Former     Current packs/day: 0.00     Types: Cigarettes     Quit date: 10/7/2015     Years since quittin.6    Smokeless tobacco: Never   Substance Use Topics    Alcohol use: Yes     Alcohol/week: 0.0 standard drinks of alcohol     Comment: occasionally     Drug use: No   [2]   Current Outpatient Medications on File Prior to Encounter   Medication Sig Dispense Refill    levothyroxine (SYNTHROID) 75 MCG tablet Take 1 tablet by mouth every morning.      lisdexamfetamine (VYVANSE) 50 MG capsule TAKE 1 capsule BY MOUTH EVERY MORNING 30 capsule 0    pantoprazole  (PROTONIX) 40 MG tablet TAKE 1 TABLET BY MOUTH ONCE DAILY 30 tablet 3    tiZANidine (ZANAFLEX) 4 MG tablet TAKE ONE TABLET BY MOUTH EVERY 8 HOURS 30 tablet 1    azelastine (ASTELIN) 137 mcg (0.1 %) nasal spray 1 spray (137 mcg total) by Nasal route 2 (two) times daily. 30 mL 0    fluticasone propionate (FLONASE) 50 mcg/actuation nasal spray 2 sprays (100 mcg total) by Each Nostril route once daily. 16 g 11    Lactobac no.41/Bifidobact no.7 (PROBIOTIC-10 ORAL) Take by mouth.      linaCLOtide (LINZESS) 145 mcg Cap capsule Take 145 mcg by mouth.      meloxicam (MOBIC) 15 MG tablet Take 15 mg by mouth once daily.      rOPINIRole (REQUIP) 0.5 MG tablet TAKE ONE TABLET BY MOUTH EVERY EVENING 30 tablet 11    valACYclovir (VALTREX) 1000 MG tablet Take 1 tablet by mouth once daily.      XIIDRA 5 % Dpet        No current facility-administered medications on file prior to encounter.

## 2025-05-13 VITALS
HEART RATE: 61 BPM | DIASTOLIC BLOOD PRESSURE: 86 MMHG | OXYGEN SATURATION: 100 % | RESPIRATION RATE: 18 BRPM | SYSTOLIC BLOOD PRESSURE: 128 MMHG | BODY MASS INDEX: 30.45 KG/M2 | WEIGHT: 205.56 LBS | HEIGHT: 69 IN | TEMPERATURE: 98 F

## 2025-05-14 ENCOUNTER — RESULTS FOLLOW-UP (OUTPATIENT)
Dept: GASTROENTEROLOGY | Facility: CLINIC | Age: 50
End: 2025-05-14

## 2025-05-14 LAB
ESTROGEN SERPL-MCNC: NORMAL PG/ML
INSULIN SERPL-ACNC: NORMAL U[IU]/ML
LAB AP CLINICAL INFORMATION: NORMAL
LAB AP GROSS DESCRIPTION: NORMAL
LAB AP PERFORMING LOCATION(S): NORMAL
LAB AP REPORT FOOTNOTES: NORMAL

## 2025-05-15 ENCOUNTER — TELEPHONE (OUTPATIENT)
Dept: SURGERY | Facility: CLINIC | Age: 50
End: 2025-05-15
Payer: COMMERCIAL

## 2025-05-15 NOTE — TELEPHONE ENCOUNTER
----- Message from Latasha sent at 5/15/2025  1:01 PM CDT -----  Contact: [place]  .Type:  Patient Returning CallWho Called:Mehdi Haynes Message for Patient:nurseDoes the patient know what this is regarding?:yesWould the patient rather a call back or a response via [email or web address]? Call [facility] Call Back Number:[phone] Information: [email or web address]

## 2025-05-20 ENCOUNTER — PATIENT MESSAGE (OUTPATIENT)
Dept: GASTROENTEROLOGY | Facility: CLINIC | Age: 50
End: 2025-05-20
Payer: COMMERCIAL

## 2025-05-23 DIAGNOSIS — M79.672 LEFT FOOT PAIN: ICD-10-CM

## 2025-05-23 RX ORDER — TIZANIDINE 4 MG/1
4 TABLET ORAL EVERY 8 HOURS
Qty: 30 TABLET | Refills: 1 | Status: SHIPPED | OUTPATIENT
Start: 2025-05-23

## 2025-05-23 NOTE — TELEPHONE ENCOUNTER
No care due was identified.  Health [facility] Embedded Care Due Messages. Reference number: [id number].   5/23/2025 9:27:57 AM CDT

## 2025-05-29 ENCOUNTER — HOSPITAL ENCOUNTER (OUTPATIENT)
Dept: RADIOLOGY | Facility: HOSPITAL | Age: 50
Discharge: HOME | End: 2025-05-29
Attending: FAMILY MEDICINE
Payer: COMMERCIAL

## 2025-05-29 ENCOUNTER — HOSPITAL ENCOUNTER (OUTPATIENT)
Dept: RADIOLOGY | Facility: HOSPITAL | Age: 50
Discharge: HOME | End: 2025-05-29
Attending: INTERNAL MEDICINE
Payer: COMMERCIAL

## 2025-05-29 DIAGNOSIS — R11.10 REGURGITATION OF FOOD: ICD-10-CM

## 2025-05-29 DIAGNOSIS — Z12.39 ENCOUNTER FOR SCREENING FOR MALIGNANT NEOPLASM OF BREAST, UNSPECIFIED SCREENING MODALITY: ICD-10-CM

## 2025-05-29 PROCEDURE — 77063 BREAST TOMOSYNTHESIS BI: CPT | Mod: 26,,, | Performed by: STUDENT IN AN ORGANIZED HEALTH CARE EDUCATION/TRAINING PROGRAM

## 2025-05-29 PROCEDURE — 77067 SCR MAMMO BI INCL CAD: CPT | Mod: 26,,, | Performed by: STUDENT IN AN ORGANIZED HEALTH CARE EDUCATION/TRAINING PROGRAM

## 2025-05-29 PROCEDURE — 78264 GASTRIC EMPTYING IMG STUDY: CPT | Mod: 26,,, | Performed by: RADIOLOGY

## 2025-05-29 PROCEDURE — 77067 SCR MAMMO BI INCL CAD: CPT | Mod: TC

## 2025-05-29 PROCEDURE — A9541 TC99M SULFUR COLLOID: HCPCS | Performed by: INTERNAL MEDICINE

## 2025-05-29 PROCEDURE — 78264 GASTRIC EMPTYING IMG STUDY: CPT | Mod: TC

## 2025-05-29 RX ORDER — TECHNETIUM TC 99M SULFUR COLLOID 2 MG
1 KIT MISCELLANEOUS
Status: COMPLETED | OUTPATIENT
Start: 2025-05-29 | End: 2025-05-29

## 2025-05-29 RX ADMIN — TECHNETIUM TC 99M SULFUR COLLOID 1 MILLICURIE: KIT at 09:05

## 2025-05-30 ENCOUNTER — RESULTS FOLLOW-UP (OUTPATIENT)
Dept: GASTROENTEROLOGY | Facility: CLINIC | Age: 50
End: 2025-05-30

## 2025-06-02 ENCOUNTER — PATIENT MESSAGE (OUTPATIENT)
Dept: HEMATOLOGY/ONCOLOGY | Facility: CLINIC | Age: 50
End: 2025-06-02
Payer: COMMERCIAL

## 2025-06-02 ENCOUNTER — OFFICE VISIT (OUTPATIENT)
Dept: SURGERY | Facility: CLINIC | Age: 50
End: 2025-06-02
Payer: COMMERCIAL

## 2025-06-02 VITALS — BODY MASS INDEX: 31.03 KG/M2 | WEIGHT: 210.13 LBS

## 2025-06-02 DIAGNOSIS — Z80.3 FAMILY HISTORY OF BREAST CANCER: ICD-10-CM

## 2025-06-02 DIAGNOSIS — Z91.89 AT HIGH RISK FOR BREAST CANCER: Primary | ICD-10-CM

## 2025-06-02 DIAGNOSIS — Z71.89 COUNSELING ON HEALTH PROMOTION AND DISEASE PREVENTION: ICD-10-CM

## 2025-06-02 PROCEDURE — 99999 PR PBB SHADOW E&M-EST. PATIENT-LVL III: CPT | Mod: PBBFAC,,, | Performed by: PHYSICIAN ASSISTANT

## 2025-06-05 ENCOUNTER — PATIENT MESSAGE (OUTPATIENT)
Dept: FAMILY MEDICINE | Facility: CLINIC | Age: 50
End: 2025-06-05
Payer: COMMERCIAL

## 2025-06-06 ENCOUNTER — OFFICE VISIT (OUTPATIENT)
Dept: FAMILY MEDICINE | Facility: CLINIC | Age: 50
End: 2025-06-06
Payer: COMMERCIAL

## 2025-06-06 ENCOUNTER — LAB VISIT (OUTPATIENT)
Dept: LAB | Facility: HOSPITAL | Age: 50
End: 2025-06-06
Attending: FAMILY MEDICINE
Payer: COMMERCIAL

## 2025-06-06 VITALS
HEART RATE: 68 BPM | BODY MASS INDEX: 30.96 KG/M2 | SYSTOLIC BLOOD PRESSURE: 112 MMHG | DIASTOLIC BLOOD PRESSURE: 60 MMHG | HEIGHT: 69 IN | WEIGHT: 209 LBS | OXYGEN SATURATION: 97 %

## 2025-06-06 DIAGNOSIS — Z98.84 HISTORY OF REMOVAL OF LAPAROSCOPIC GASTRIC BANDING DEVICE: ICD-10-CM

## 2025-06-06 DIAGNOSIS — F90.9 ADULT ADHD: ICD-10-CM

## 2025-06-06 DIAGNOSIS — E66.9 OBESITY (BMI 30-39.9): Primary | ICD-10-CM

## 2025-06-06 DIAGNOSIS — E66.9 OBESITY (BMI 30-39.9): ICD-10-CM

## 2025-06-06 LAB
25(OH)D3+25(OH)D2 SERPL-MCNC: 26 NG/ML (ref 30–96)
ABSOLUTE EOSINOPHIL (OHS): 0.09 K/UL
ABSOLUTE MONOCYTE (OHS): 0.48 K/UL (ref 0.3–1)
ABSOLUTE NEUTROPHIL COUNT (OHS): 4.06 K/UL (ref 1.8–7.7)
BASOPHILS # BLD AUTO: 0.03 K/UL
BASOPHILS NFR BLD AUTO: 0.5 %
CHOLEST SERPL-MCNC: 235 MG/DL (ref 120–199)
CHOLEST/HDLC SERPL: 3.7 {RATIO} (ref 2–5)
EAG (OHS): 103 MG/DL (ref 68–131)
ERYTHROCYTE [DISTWIDTH] IN BLOOD BY AUTOMATED COUNT: 12.1 % (ref 11.5–14.5)
HBA1C MFR BLD: 5.2 % (ref 4–5.6)
HCT VFR BLD AUTO: 45 % (ref 37–48.5)
HDLC SERPL-MCNC: 64 MG/DL (ref 40–75)
HDLC SERPL: 27.2 % (ref 20–50)
HGB BLD-MCNC: 14.9 GM/DL (ref 12–16)
IMM GRANULOCYTES # BLD AUTO: 0.02 K/UL (ref 0–0.04)
IMM GRANULOCYTES NFR BLD AUTO: 0.3 % (ref 0–0.5)
LDLC SERPL CALC-MCNC: 153.2 MG/DL (ref 63–159)
LYMPHOCYTES # BLD AUTO: 1.5 K/UL (ref 1–4.8)
MCH RBC QN AUTO: 30.4 PG (ref 27–31)
MCHC RBC AUTO-ENTMCNC: 33.1 G/DL (ref 32–36)
MCV RBC AUTO: 92 FL (ref 82–98)
NONHDLC SERPL-MCNC: 171 MG/DL
NUCLEATED RBC (/100WBC) (OHS): 0 /100 WBC
PLATELET # BLD AUTO: 215 K/UL (ref 150–450)
PMV BLD AUTO: 10.7 FL (ref 9.2–12.9)
RBC # BLD AUTO: 4.9 M/UL (ref 4–5.4)
RELATIVE EOSINOPHIL (OHS): 1.5 %
RELATIVE LYMPHOCYTE (OHS): 24.3 % (ref 18–48)
RELATIVE MONOCYTE (OHS): 7.8 % (ref 4–15)
RELATIVE NEUTROPHIL (OHS): 65.6 % (ref 38–73)
TRIGL SERPL-MCNC: 89 MG/DL (ref 30–150)
VIT B12 SERPL-MCNC: 403 PG/ML (ref 210–950)
WBC # BLD AUTO: 6.18 K/UL (ref 3.9–12.7)

## 2025-06-06 PROCEDURE — 83036 HEMOGLOBIN GLYCOSYLATED A1C: CPT

## 2025-06-06 PROCEDURE — 99999 PR PBB SHADOW E&M-EST. PATIENT-LVL IV: CPT | Mod: PBBFAC,,, | Performed by: FAMILY MEDICINE

## 2025-06-06 PROCEDURE — 85025 COMPLETE CBC W/AUTO DIFF WBC: CPT

## 2025-06-06 PROCEDURE — 36415 COLL VENOUS BLD VENIPUNCTURE: CPT | Mod: PN

## 2025-06-06 PROCEDURE — 80061 LIPID PANEL: CPT

## 2025-06-06 PROCEDURE — 82607 VITAMIN B-12: CPT

## 2025-06-06 PROCEDURE — 82306 VITAMIN D 25 HYDROXY: CPT

## 2025-06-08 ENCOUNTER — RESULTS FOLLOW-UP (OUTPATIENT)
Dept: FAMILY MEDICINE | Facility: CLINIC | Age: 50
End: 2025-06-08

## 2025-06-11 ENCOUNTER — PATIENT MESSAGE (OUTPATIENT)
Dept: FAMILY MEDICINE | Facility: CLINIC | Age: 50
End: 2025-06-11
Payer: COMMERCIAL

## 2025-07-23 RX ORDER — PANTOPRAZOLE SODIUM 40 MG/1
40 TABLET, DELAYED RELEASE ORAL
Qty: 30 TABLET | Refills: 3 | Status: SHIPPED | OUTPATIENT
Start: 2025-07-23

## 2025-08-11 ENCOUNTER — PATIENT MESSAGE (OUTPATIENT)
Dept: FAMILY MEDICINE | Facility: CLINIC | Age: 50
End: 2025-08-11
Payer: COMMERCIAL

## 2025-08-13 ENCOUNTER — PATIENT MESSAGE (OUTPATIENT)
Dept: ADMINISTRATIVE | Facility: HOSPITAL | Age: 50
End: 2025-08-13
Payer: COMMERCIAL

## 2025-08-18 ENCOUNTER — OFFICE VISIT (OUTPATIENT)
Dept: FAMILY MEDICINE | Facility: CLINIC | Age: 50
End: 2025-08-18
Payer: COMMERCIAL

## 2025-08-18 VITALS
OXYGEN SATURATION: 99 % | HEART RATE: 88 BPM | WEIGHT: 214.5 LBS | DIASTOLIC BLOOD PRESSURE: 74 MMHG | BODY MASS INDEX: 31.77 KG/M2 | HEIGHT: 69 IN | SYSTOLIC BLOOD PRESSURE: 116 MMHG | TEMPERATURE: 98 F

## 2025-08-18 DIAGNOSIS — B97.89 ACUTE VIRAL SINUSITIS: ICD-10-CM

## 2025-08-18 DIAGNOSIS — J01.90 ACUTE VIRAL SINUSITIS: ICD-10-CM

## 2025-08-18 DIAGNOSIS — J02.9 VIRAL PHARYNGITIS: Primary | ICD-10-CM

## 2025-08-18 LAB
CTP QC/QA: YES
S PYO RRNA THROAT QL PROBE: NEGATIVE

## 2025-08-18 PROCEDURE — 99999 PR PBB SHADOW E&M-EST. PATIENT-LVL IV: CPT | Mod: PBBFAC,,, | Performed by: STUDENT IN AN ORGANIZED HEALTH CARE EDUCATION/TRAINING PROGRAM

## 2025-08-26 DIAGNOSIS — F90.9 ADULT ADHD: ICD-10-CM

## 2025-08-26 RX ORDER — LISDEXAMFETAMINE DIMESYLATE 50 MG/1
50 CAPSULE ORAL EVERY MORNING
Qty: 30 CAPSULE | Refills: 0 | Status: SHIPPED | OUTPATIENT
Start: 2025-08-26